# Patient Record
Sex: FEMALE | Race: WHITE | URBAN - NONMETROPOLITAN AREA
[De-identification: names, ages, dates, MRNs, and addresses within clinical notes are randomized per-mention and may not be internally consistent; named-entity substitution may affect disease eponyms.]

---

## 2017-08-15 ENCOUNTER — DOCTOR'S OFFICE (OUTPATIENT)
Dept: URBAN - NONMETROPOLITAN AREA CLINIC 1 | Facility: CLINIC | Age: 57
Setting detail: OPHTHALMOLOGY
End: 2017-08-15
Payer: COMMERCIAL

## 2017-08-15 DIAGNOSIS — H52.13: ICD-10-CM

## 2017-08-15 DIAGNOSIS — H52.4: ICD-10-CM

## 2017-08-15 DIAGNOSIS — H25.13: ICD-10-CM

## 2017-08-15 DIAGNOSIS — H04.123: ICD-10-CM

## 2017-08-15 DIAGNOSIS — H18.052: ICD-10-CM

## 2017-08-15 PROCEDURE — 92014 COMPRE OPH EXAM EST PT 1/>: CPT | Performed by: OPTOMETRIST

## 2017-08-15 PROCEDURE — 92015 DETERMINE REFRACTIVE STATE: CPT | Performed by: OPTOMETRIST

## 2017-08-15 ASSESSMENT — VISUAL ACUITY
OS_BCVA: 20/25+2
OD_BCVA: 20/20-1

## 2017-08-15 ASSESSMENT — REFRACTION_OUTSIDERX
OD_ADD: +2.25
OS_VA1: 20/20
OD_VA3: 20/
OS_AXIS: 085
OS_VA3: 20/
OS_ADD: +2.25
OS_CYLINDER: -0.75
OD_AXIS: 060
OU_VA: 20/
OD_VA2: 20/20
OS_VA2: 20/20
OD_SPHERE: +0.25
OS_SPHERE: PLANO
OD_VA1: 20/20
OD_CYLINDER: -1.00

## 2017-08-15 ASSESSMENT — REFRACTION_AUTOREFRACTION
OS_AXIS: 076
OD_SPHERE: +0.25
OD_CYLINDER: -0.75
OS_SPHERE: +0.25
OS_CYLINDER: -0.75
OD_AXIS: 047

## 2017-08-15 ASSESSMENT — REFRACTION_CURRENTRX
OD_SPHERE: -0.25
OS_AXIS: 087
OD_OVR_VA: 20/
OD_VPRISM_DIRECTION: PROGS
OS_OVR_VA: 20/
OD_ADD: +1.25
OS_OVR_VA: 20/
OS_OVR_VA: 20/
OS_VPRISM_DIRECTION: PROGS
OD_OVR_VA: 20/
OD_CYLINDER: -0.50
OS_SPHERE: -0.25
OD_OVR_VA: 20/
OS_ADD: +1.25
OS_CYLINDER: -0.75
OD_AXIS: 059

## 2017-08-15 ASSESSMENT — REFRACTION_MANIFEST
OD_VA1: 20/
OD_VA3: 20/
OU_VA: 20/
OS_VA3: 20/
OS_VA1: 20/
OD_VA2: 20/
OD_VA1: 20/
OD_VA3: 20/
OS_VA2: 20/
OD_VA2: 20/
OS_VA1: 20/
OS_VA3: 20/
OS_VA2: 20/
OU_VA: 20/

## 2017-08-15 ASSESSMENT — CONFRONTATIONAL VISUAL FIELD TEST (CVF)
OS_FINDINGS: FULL
OD_FINDINGS: FULL

## 2017-08-15 ASSESSMENT — SPHEQUIV_DERIVED
OD_SPHEQUIV: -0.125
OS_SPHEQUIV: -0.125

## 2018-08-28 ENCOUNTER — OPTICAL OFFICE (OUTPATIENT)
Dept: URBAN - NONMETROPOLITAN AREA CLINIC 4 | Facility: CLINIC | Age: 58
Setting detail: OPHTHALMOLOGY
End: 2018-08-28
Payer: COMMERCIAL

## 2018-08-28 ENCOUNTER — DOCTOR'S OFFICE (OUTPATIENT)
Dept: URBAN - NONMETROPOLITAN AREA CLINIC 1 | Facility: CLINIC | Age: 58
Setting detail: OPHTHALMOLOGY
End: 2018-08-28
Payer: COMMERCIAL

## 2018-08-28 DIAGNOSIS — H52.13: ICD-10-CM

## 2018-08-28 DIAGNOSIS — H52.4: ICD-10-CM

## 2018-08-28 DIAGNOSIS — H52.223: ICD-10-CM

## 2018-08-28 PROCEDURE — V2781 PROGRESSIVE LENS PER LENS: HCPCS | Performed by: OPTOMETRIST

## 2018-08-28 PROCEDURE — 92014 COMPRE OPH EXAM EST PT 1/>: CPT | Performed by: OPTOMETRIST

## 2018-08-28 PROCEDURE — V2799 MISC VISION ITEM OR SERVICE: HCPCS | Performed by: OPTOMETRIST

## 2018-08-28 PROCEDURE — V2203 LENS SPHCYL BIFOCAL 4.00D/.1: HCPCS | Performed by: OPTOMETRIST

## 2018-08-28 PROCEDURE — V2750 ANTI-REFLECTIVE COATING: HCPCS | Performed by: OPTOMETRIST

## 2018-08-28 PROCEDURE — V2020 VISION SVCS FRAMES PURCHASES: HCPCS | Performed by: OPTOMETRIST

## 2018-08-28 ASSESSMENT — REFRACTION_AUTOREFRACTION
OD_AXIS: 81
OS_CYLINDER: -1.50
OD_CYLINDER: -1.00
OS_SPHERE: +1.00
OS_AXIS: 76
OD_SPHERE: +0.50

## 2018-08-28 ASSESSMENT — SPHEQUIV_DERIVED
OD_SPHEQUIV: 0
OD_SPHEQUIV: 0
OS_SPHEQUIV: 0.25
OS_SPHEQUIV: -0.125

## 2018-08-28 ASSESSMENT — REFRACTION_MANIFEST
OS_VA1: 20/20
OD_VA3: 20/
OS_VA3: 20/
OD_CYLINDER: -1.00
OS_VA2: 20/
OD_VA2: 20/20
OD_VA1: 20/
OU_VA: 20/
OU_VA: 20/
OS_VA2: 20/20
OD_VA2: 20/
OS_ADD: +2.25
OS_CYLINDER: -1.25
OS_VA3: 20/
OD_VA1: 20/20
OS_SPHERE: +0.50
OS_VA1: 20/
OD_SPHERE: +0.50
OD_ADD: +2.25
OD_VA3: 20/
OS_AXIS: 076
OD_AXIS: 081

## 2018-08-28 ASSESSMENT — REFRACTION_CURRENTRX
OD_ADD: +1.25
OS_CYLINDER: -0.75
OD_AXIS: 059
OD_OVR_VA: 20/
OD_OVR_VA: 20/
OS_OVR_VA: 20/
OD_OVR_VA: 20/
OS_OVR_VA: 20/
OS_OVR_VA: 20/
OS_ADD: +1.25
OD_CYLINDER: -0.50
OD_VPRISM_DIRECTION: PROGS
OD_SPHERE: -0.25
OS_SPHERE: -0.25
OS_VPRISM_DIRECTION: PROGS
OS_AXIS: 087

## 2018-08-28 ASSESSMENT — VISUAL ACUITY
OS_BCVA: 20/30
OD_BCVA: 20/20

## 2018-08-28 ASSESSMENT — CONFRONTATIONAL VISUAL FIELD TEST (CVF)
OS_FINDINGS: FULL
OD_FINDINGS: FULL

## 2019-10-25 ENCOUNTER — DOCTOR'S OFFICE (OUTPATIENT)
Dept: URBAN - NONMETROPOLITAN AREA CLINIC 1 | Facility: CLINIC | Age: 59
Setting detail: OPHTHALMOLOGY
End: 2019-10-25
Payer: COMMERCIAL

## 2019-10-25 ENCOUNTER — RX ONLY (RX ONLY)
Age: 59
End: 2019-10-25

## 2019-10-25 DIAGNOSIS — H04.123: ICD-10-CM

## 2019-10-25 DIAGNOSIS — H25.13: ICD-10-CM

## 2019-10-25 DIAGNOSIS — H18.052: ICD-10-CM

## 2019-10-25 PROCEDURE — 92083 EXTENDED VISUAL FIELD XM: CPT | Performed by: OPTOMETRIST

## 2019-10-25 PROCEDURE — 92014 COMPRE OPH EXAM EST PT 1/>: CPT | Performed by: OPTOMETRIST

## 2019-10-25 ASSESSMENT — REFRACTION_CURRENTRX
OS_VPRISM_DIRECTION: PROGS
OD_OVR_VA: 20/
OD_AXIS: 065
OS_SPHERE: +0.50
OD_OVR_VA: 20/
OD_CYLINDER: -0.75
OD_SPHERE: +0.75
OD_OVR_VA: 20/
OS_ADD: +2.25
OS_CYLINDER: -1.25
OD_ADD: +2.25
OS_OVR_VA: 20/
OS_OVR_VA: 20/
OD_VPRISM_DIRECTION: PROGS
OS_OVR_VA: 20/
OS_AXIS: 075

## 2019-10-25 ASSESSMENT — REFRACTION_MANIFEST
OU_VA: 20/
OD_VA1: 20/20
OS_VA2: 20/
OD_ADD: +2.50
OU_VA: 20/
OS_VA2: 20/20
OD_SPHERE: +0.50
OS_VA1: 20/
OS_ADD: +2.50
OD_AXIS: 090
OS_VA3: 20/
OS_CYLINDER: -1.00
OS_AXIS: 076
OS_SPHERE: +0.50
OD_VA3: 20/
OD_VA1: 20/
OD_VA3: 20/
OD_CYLINDER: -1.25
OD_VA2: 20/20
OS_VA3: 20/
OS_VA1: 20/20
OD_VA2: 20/

## 2019-10-25 ASSESSMENT — REFRACTION_AUTOREFRACTION
OD_SPHERE: 0.00
OD_CYLINDER: -0.50
OD_AXIS: 092
OS_AXIS: 011
OS_CYLINDER: -0.75
OS_SPHERE: -0.25

## 2019-10-25 ASSESSMENT — CONFRONTATIONAL VISUAL FIELD TEST (CVF)
OD_FINDINGS: FULL
OS_FINDINGS: FULL

## 2019-10-25 ASSESSMENT — SPHEQUIV_DERIVED
OS_SPHEQUIV: -0.625
OS_SPHEQUIV: 0
OD_SPHEQUIV: -0.25
OD_SPHEQUIV: -0.125

## 2019-10-25 ASSESSMENT — VISUAL ACUITY
OS_BCVA: 20/20-2
OD_BCVA: 20/20-2

## 2020-10-30 ENCOUNTER — OPTICAL OFFICE (OUTPATIENT)
Dept: URBAN - NONMETROPOLITAN AREA CLINIC 4 | Facility: CLINIC | Age: 60
Setting detail: OPHTHALMOLOGY
End: 2020-10-30
Payer: COMMERCIAL

## 2020-10-30 ENCOUNTER — DOCTOR'S OFFICE (OUTPATIENT)
Dept: URBAN - NONMETROPOLITAN AREA CLINIC 1 | Facility: CLINIC | Age: 60
Setting detail: OPHTHALMOLOGY
End: 2020-10-30
Payer: COMMERCIAL

## 2020-10-30 DIAGNOSIS — H52.13: ICD-10-CM

## 2020-10-30 DIAGNOSIS — H52.223: ICD-10-CM

## 2020-10-30 DIAGNOSIS — H52.4: ICD-10-CM

## 2020-10-30 PROBLEM — H04.123 DRY EYE; BOTH EYES: Status: ACTIVE | Noted: 2017-08-15

## 2020-10-30 PROCEDURE — 92014 COMPRE OPH EXAM EST PT 1/>: CPT | Performed by: OPTOMETRIST

## 2020-10-30 PROCEDURE — V2203 LENS SPHCYL BIFOCAL 4.00D/.1: HCPCS | Performed by: OPTOMETRIST

## 2020-10-30 PROCEDURE — V2020 VISION SVCS FRAMES PURCHASES: HCPCS | Performed by: OPTOMETRIST

## 2020-10-30 PROCEDURE — V2781 PROGRESSIVE LENS PER LENS: HCPCS | Performed by: OPTOMETRIST

## 2020-10-30 ASSESSMENT — REFRACTION_CURRENTRX
OD_OVR_VA: 20/
OD_VPRISM_DIRECTION: PROGS
OS_CYLINDER: -1.75
OD_AXIS: 087
OS_ADD: +2.25
OD_ADD: +2.25
OS_AXIS: 081
OS_VPRISM_DIRECTION: PROGS
OD_CYLINDER: -1.00
OS_OVR_VA: 20/
OS_SPHERE: +1.00
OD_SPHERE: +0.50

## 2020-10-30 ASSESSMENT — REFRACTION_MANIFEST
OD_VA2: 20/20
OD_ADD: +2.50
OD_VA1: 20/20
OS_ADD: +2.50
OD_CYLINDER: -1.50
OS_CYLINDER: -1.50
OD_SPHERE: +0.50
OS_SPHERE: +0.50
OD_AXIS: 098
OS_VA1: 20/20
OS_AXIS: 075
OS_VA2: 20/20

## 2020-10-30 ASSESSMENT — REFRACTION_AUTOREFRACTION
OS_SPHERE: +1.00
OD_AXIS: 098
OD_CYLINDER: -1.75
OD_SPHERE: +0.75
OS_AXIS: 072
OS_CYLINDER: -1.75

## 2020-10-30 ASSESSMENT — VISUAL ACUITY
OD_BCVA: 20/20-1
OS_BCVA: 20/20-1

## 2020-10-30 ASSESSMENT — CONFRONTATIONAL VISUAL FIELD TEST (CVF)
OD_FINDINGS: FULL
OS_FINDINGS: FULL

## 2020-10-30 ASSESSMENT — SPHEQUIV_DERIVED
OS_SPHEQUIV: -0.25
OS_SPHEQUIV: 0.125
OD_SPHEQUIV: -0.125
OD_SPHEQUIV: -0.25

## 2020-10-30 ASSESSMENT — TONOMETRY
OD_IOP_MMHG: 18
OS_IOP_MMHG: 17

## 2021-04-06 ENCOUNTER — HOSPITAL ENCOUNTER (EMERGENCY)
Facility: HOSPITAL | Age: 61
Discharge: HOME | End: 2021-04-06
Attending: EMERGENCY MEDICINE | Admitting: EMERGENCY MEDICINE
Payer: COMMERCIAL

## 2021-04-06 ENCOUNTER — TELEPHONE (OUTPATIENT)
Dept: OTHER | Facility: HOSPITAL | Age: 61
End: 2021-04-06

## 2021-04-06 ENCOUNTER — APPOINTMENT (EMERGENCY)
Dept: CT IMAGING | Facility: HOSPITAL | Age: 61
End: 2021-04-06
Payer: COMMERCIAL

## 2021-04-06 VITALS
RESPIRATION RATE: 18 BRPM | BODY MASS INDEX: 33.8 KG/M2 | TEMPERATURE: 97.5 F | HEIGHT: 64 IN | DIASTOLIC BLOOD PRESSURE: 81 MMHG | SYSTOLIC BLOOD PRESSURE: 169 MMHG | WEIGHT: 198 LBS | HEART RATE: 76 BPM | OXYGEN SATURATION: 98 %

## 2021-04-06 DIAGNOSIS — N20.1 URETEROLITHIASIS: Primary | ICD-10-CM

## 2021-04-06 DIAGNOSIS — N39.0 UTI (URINARY TRACT INFECTION): ICD-10-CM

## 2021-04-06 LAB
ALBUMIN SERPL BCP-MCNC: 3.7 G/DL (ref 3.5–5)
ALP SERPL-CCNC: 134 U/L (ref 46–116)
ALT SERPL W P-5'-P-CCNC: 29 U/L (ref 12–78)
ANION GAP SERPL CALCULATED.3IONS-SCNC: 7 MMOL/L (ref 4–13)
AST SERPL W P-5'-P-CCNC: 15 U/L (ref 5–45)
BACTERIA UR QL AUTO: ABNORMAL /HPF
BASOPHILS # BLD AUTO: 0.06 THOUSANDS/ΜL (ref 0–0.1)
BASOPHILS NFR BLD AUTO: 1 % (ref 0–1)
BILIRUB SERPL-MCNC: 0.33 MG/DL (ref 0.2–1)
BILIRUB UR QL STRIP: NEGATIVE
BUN SERPL-MCNC: 15 MG/DL (ref 5–25)
CALCIUM SERPL-MCNC: 8.8 MG/DL (ref 8.3–10.1)
CHLORIDE SERPL-SCNC: 103 MMOL/L (ref 100–108)
CLARITY UR: CLEAR
CO2 SERPL-SCNC: 30 MMOL/L (ref 21–32)
COLOR UR: ABNORMAL
CREAT SERPL-MCNC: 0.95 MG/DL (ref 0.6–1.3)
EOSINOPHIL # BLD AUTO: 0.4 THOUSAND/ΜL (ref 0–0.61)
EOSINOPHIL NFR BLD AUTO: 4 % (ref 0–6)
ERYTHROCYTE [DISTWIDTH] IN BLOOD BY AUTOMATED COUNT: 12.8 % (ref 11.6–15.1)
GFR SERPL CREATININE-BSD FRML MDRD: 66 ML/MIN/1.73SQ M
GLUCOSE SERPL-MCNC: 103 MG/DL (ref 65–140)
GLUCOSE UR STRIP-MCNC: NEGATIVE MG/DL
HCT VFR BLD AUTO: 38.2 % (ref 34.8–46.1)
HGB BLD-MCNC: 12.3 G/DL (ref 11.5–15.4)
HGB UR QL STRIP.AUTO: ABNORMAL
IMM GRANULOCYTES # BLD AUTO: 0.04 THOUSAND/UL (ref 0–0.2)
IMM GRANULOCYTES NFR BLD AUTO: 0 % (ref 0–2)
KETONES UR STRIP-MCNC: NEGATIVE MG/DL
LEUKOCYTE ESTERASE UR QL STRIP: ABNORMAL
LYMPHOCYTES # BLD AUTO: 1.22 THOUSANDS/ΜL (ref 0.6–4.47)
LYMPHOCYTES NFR BLD AUTO: 12 % (ref 14–44)
MCH RBC QN AUTO: 28.7 PG (ref 26.8–34.3)
MCHC RBC AUTO-ENTMCNC: 32.2 G/DL (ref 31.4–37.4)
MCV RBC AUTO: 89 FL (ref 82–98)
MONOCYTES # BLD AUTO: 1.11 THOUSAND/ΜL (ref 0.17–1.22)
MONOCYTES NFR BLD AUTO: 11 % (ref 4–12)
NEUTROPHILS # BLD AUTO: 7.36 THOUSANDS/ΜL (ref 1.85–7.62)
NEUTS SEG NFR BLD AUTO: 72 % (ref 43–75)
NITRITE UR QL STRIP: POSITIVE
NON-SQ EPI CELLS URNS QL MICRO: ABNORMAL /HPF
NRBC BLD AUTO-RTO: 0 /100 WBCS
PH UR STRIP.AUTO: 6.5 [PH]
PLATELET # BLD AUTO: 277 THOUSANDS/UL (ref 149–390)
PMV BLD AUTO: 9.9 FL (ref 8.9–12.7)
POTASSIUM SERPL-SCNC: 4.1 MMOL/L (ref 3.5–5.3)
PROT SERPL-MCNC: 7.6 G/DL (ref 6.4–8.2)
PROT UR STRIP-MCNC: ABNORMAL MG/DL
RBC # BLD AUTO: 4.29 MILLION/UL (ref 3.81–5.12)
RBC #/AREA URNS AUTO: ABNORMAL /HPF
SODIUM SERPL-SCNC: 140 MMOL/L (ref 136–145)
SP GR UR STRIP.AUTO: 1.01 (ref 1–1.03)
UROBILINOGEN UR QL STRIP.AUTO: 1 E.U./DL
WBC # BLD AUTO: 10.19 THOUSAND/UL (ref 4.31–10.16)
WBC #/AREA URNS AUTO: ABNORMAL /HPF

## 2021-04-06 PROCEDURE — G1004 CDSM NDSC: HCPCS

## 2021-04-06 PROCEDURE — 96375 TX/PRO/DX INJ NEW DRUG ADDON: CPT

## 2021-04-06 PROCEDURE — 36415 COLL VENOUS BLD VENIPUNCTURE: CPT | Performed by: EMERGENCY MEDICINE

## 2021-04-06 PROCEDURE — 74176 CT ABD & PELVIS W/O CONTRAST: CPT

## 2021-04-06 PROCEDURE — 99284 EMERGENCY DEPT VISIT MOD MDM: CPT

## 2021-04-06 PROCEDURE — 96374 THER/PROPH/DIAG INJ IV PUSH: CPT

## 2021-04-06 PROCEDURE — 81001 URINALYSIS AUTO W/SCOPE: CPT | Performed by: EMERGENCY MEDICINE

## 2021-04-06 PROCEDURE — 96361 HYDRATE IV INFUSION ADD-ON: CPT

## 2021-04-06 PROCEDURE — 85025 COMPLETE CBC W/AUTO DIFF WBC: CPT | Performed by: EMERGENCY MEDICINE

## 2021-04-06 PROCEDURE — 87077 CULTURE AEROBIC IDENTIFY: CPT | Performed by: EMERGENCY MEDICINE

## 2021-04-06 PROCEDURE — 99285 EMERGENCY DEPT VISIT HI MDM: CPT | Performed by: EMERGENCY MEDICINE

## 2021-04-06 PROCEDURE — 87086 URINE CULTURE/COLONY COUNT: CPT | Performed by: EMERGENCY MEDICINE

## 2021-04-06 PROCEDURE — 80053 COMPREHEN METABOLIC PANEL: CPT | Performed by: EMERGENCY MEDICINE

## 2021-04-06 RX ORDER — SULFAMETHOXAZOLE AND TRIMETHOPRIM 800; 160 MG/1; MG/1
1 TABLET ORAL ONCE
Status: COMPLETED | OUTPATIENT
Start: 2021-04-06 | End: 2021-04-06

## 2021-04-06 RX ORDER — ONDANSETRON 2 MG/ML
4 INJECTION INTRAMUSCULAR; INTRAVENOUS ONCE
Status: COMPLETED | OUTPATIENT
Start: 2021-04-06 | End: 2021-04-06

## 2021-04-06 RX ORDER — MONTELUKAST SODIUM 10 MG/1
10 TABLET ORAL DAILY
COMMUNITY

## 2021-04-06 RX ORDER — OXYCODONE HYDROCHLORIDE AND ACETAMINOPHEN 5; 325 MG/1; MG/1
1 TABLET ORAL EVERY 8 HOURS PRN
Qty: 12 TABLET | Refills: 0 | Status: SHIPPED | OUTPATIENT
Start: 2021-04-06 | End: 2021-04-16

## 2021-04-06 RX ORDER — AZATHIOPRINE 50 MG/1
50 TABLET ORAL DAILY
Status: ON HOLD | COMMUNITY
End: 2021-05-19

## 2021-04-06 RX ORDER — KETOROLAC TROMETHAMINE 30 MG/ML
30 INJECTION, SOLUTION INTRAMUSCULAR; INTRAVENOUS ONCE
Status: COMPLETED | OUTPATIENT
Start: 2021-04-06 | End: 2021-04-06

## 2021-04-06 RX ORDER — TAMSULOSIN HYDROCHLORIDE 0.4 MG/1
0.8 CAPSULE ORAL
Qty: 14 CAPSULE | Refills: 0 | Status: SHIPPED | OUTPATIENT
Start: 2021-04-06 | End: 2021-05-20 | Stop reason: SDUPTHER

## 2021-04-06 RX ORDER — SULFAMETHOXAZOLE AND TRIMETHOPRIM 800; 160 MG/1; MG/1
1 TABLET ORAL 2 TIMES DAILY
Qty: 14 TABLET | Refills: 0 | Status: SHIPPED | OUTPATIENT
Start: 2021-04-06 | End: 2021-04-13

## 2021-04-06 RX ORDER — EZETIMIBE 10 MG/1
10 TABLET ORAL DAILY
COMMUNITY
Start: 2020-11-07 | End: 2021-12-09

## 2021-04-06 RX ORDER — PANTOPRAZOLE SODIUM 40 MG/1
40 TABLET, DELAYED RELEASE ORAL DAILY
COMMUNITY
Start: 2020-11-23

## 2021-04-06 RX ADMIN — SODIUM CHLORIDE 1000 ML: 0.9 INJECTION, SOLUTION INTRAVENOUS at 08:10

## 2021-04-06 RX ADMIN — KETOROLAC TROMETHAMINE 30 MG: 30 INJECTION, SOLUTION INTRAMUSCULAR at 08:10

## 2021-04-06 RX ADMIN — MORPHINE SULFATE 2 MG: 2 INJECTION, SOLUTION INTRAMUSCULAR; INTRAVENOUS at 10:03

## 2021-04-06 RX ADMIN — SULFAMETHOXAZOLE AND TRIMETHOPRIM 1 TABLET: 800; 160 TABLET ORAL at 10:04

## 2021-04-06 RX ADMIN — ONDANSETRON 4 MG: 2 INJECTION INTRAMUSCULAR; INTRAVENOUS at 08:09

## 2021-04-06 NOTE — ED PROVIDER NOTES
History  Chief Complaint   Patient presents with    Flank Pain     Pt reports L flank pain that radiates to L groin for the last 2 days  Hx of kidney stones  Pt thought it would related to a uti and took OTC Azo  Two days left flank pain with radiation to the left lower quadrant  Some associated nausea  No vomiting  No abdominal pain  No dysuria  No fevers  History provided by:  Patient   used: No    Flank Pain  Pain location:  L flank  Pain quality: sharp    Pain radiates to:  LLQ  Pain severity:  Severe  Onset quality:  Gradual  Duration:  2 days  Timing:  Constant  Progression:  Unchanged  Chronicity:  New  Relieved by:  Nothing  Worsened by:  Nothing  Associated symptoms: nausea    Associated symptoms: no anorexia, no belching, no chest pain, no chills, no constipation, no cough, no diarrhea, no dysuria, no fatigue, no fever, no flatus, no hematemesis, no hematochezia, no hematuria, no shortness of breath, no sore throat, no vaginal bleeding, no vaginal discharge and no vomiting        Prior to Admission Medications   Prescriptions Last Dose Informant Patient Reported? Taking?   azaTHIOprine (IMURAN) 50 mg tablet   Yes Yes   Sig: Take 50 mg by mouth daily   ezetimibe (ZETIA) 10 mg tablet   Yes Yes   Sig: Take 10 mg by mouth daily   montelukast (SINGULAIR) 10 mg tablet   Yes No   Sig: Take 10 mg by mouth daily   pantoprazole (PROTONIX) 40 mg tablet   Yes Yes   Sig: Take 40 mg by mouth daily      Facility-Administered Medications: None       Past Medical History:   Diagnosis Date    Breast cancer ([facility] )     H/O cervical fracture     Rheumatoid arthritis ([facility] )        Past Surgical History:   Procedure Laterality Date    BLADDER SUSPENSION      BREAST LUMPECTOMY       SECTION      HYSTERECTOMY         History reviewed  No pertinent family history  I have reviewed and agree with the history as documented      E-Cigarette/Vaping     E-Cigarette/Vaping Substances Social History     Tobacco Use    Smoking status: Former Smoker     Quit date:      Years since quittin 2    Smokeless tobacco: Never Used   Substance Use Topics    Alcohol use: Yes     Frequency: Monthly or less     Drinks per session: 1 or 2    Drug use: Not Currently       Review of Systems   Constitutional: Negative for chills, fatigue and fever  HENT: Negative for ear pain, hearing loss, sore throat, trouble swallowing and voice change  Eyes: Negative for pain and discharge  Respiratory: Negative for cough, shortness of breath and wheezing  Cardiovascular: Negative for chest pain and palpitations  Gastrointestinal: Positive for nausea  Negative for abdominal pain, anorexia, blood in stool, constipation, diarrhea, flatus, hematemesis, hematochezia and vomiting  Genitourinary: Positive for flank pain  Negative for dysuria, frequency, hematuria, vaginal bleeding and vaginal discharge  Musculoskeletal: Negative for joint swelling, neck pain and neck stiffness  Skin: Negative for rash and wound  Neurological: Negative for dizziness, seizures, syncope, facial asymmetry and headaches  Psychiatric/Behavioral: Negative for hallucinations, self-injury and suicidal ideas  All other systems reviewed and are negative  Physical Exam  Physical Exam  Vitals signs and nursing note reviewed  Constitutional:       General: She is not in acute distress  Appearance: She is well-developed  HENT:      Head: Normocephalic and atraumatic  Right Ear: External ear normal       Left Ear: External ear normal    Eyes:      Conjunctiva/sclera: Conjunctivae normal       Pupils: Pupils are equal, round, and reactive to light  Neck:      Musculoskeletal: Normal range of motion and neck supple  Cardiovascular:      Rate and Rhythm: Normal rate and regular rhythm  Heart sounds: Normal heart sounds  No murmur     Pulmonary:      Effort: Pulmonary effort is normal       Breath sounds: Normal breath sounds  No wheezing or rales  Abdominal:      General: Bowel sounds are normal  There is no distension  Palpations: Abdomen is soft  Tenderness: There is no abdominal tenderness  There is no guarding or rebound  Musculoskeletal: Normal range of motion  General: No deformity  Skin:     General: Skin is warm and dry  Findings: No rash  Neurological:      General: No focal deficit present  Mental Status: She is alert and oriented to person, place, and time  Cranial Nerves: No cranial nerve deficit     Psychiatric:         Behavior: Behavior normal          Vital Signs  ED Triage Vitals   Temperature Pulse Respirations Blood Pressure SpO2   04/06/21 0740 04/06/21 0742 04/06/21 0742 04/06/21 0743 04/06/21 0742   98 2 °F (36 8 °C) 79 19 154/95 99 %      Temp Source Heart Rate Source Patient Position - Orthostatic VS BP Location FiO2 (%)   04/06/21 0740 04/06/21 0742 04/06/21 0946 04/06/21 0946 --   Temporal Monitor Sitting Left arm       Pain Score       04/06/21 0742       9           Vitals:    04/06/21 0742 04/06/21 0743 04/06/21 0946   BP:  154/95 151/75   Pulse: 79  72   Patient Position - Orthostatic VS:   Sitting         Visual Acuity      ED Medications  Medications   morphine injection 2 mg (has no administration in time range)   sodium chloride 0 9 % bolus 1,000 mL (1,000 mL Intravenous New Bag 4/6/21 0810)   ketorolac (TORADOL) injection 30 mg (30 mg Intravenous Given 4/6/21 0810)   morphine injection 2 mg (2 mg Intravenous Given 4/6/21 1003)   ondansetron (ZOFRAN) injection 4 mg (4 mg Intravenous Given 4/6/21 0809)   sulfamethoxazole-trimethoprim (BACTRIM DS) 800-160 mg per tablet 1 tablet (1 tablet Oral Given 4/6/21 1004)       Diagnostic Studies  Results Reviewed     Procedure Component Value Units Date/Time    Comprehensive metabolic panel [[id number]]  (Abnormal) Collected: 04/06/21 1958    Lab Status: Final result Specimen: Blood from Arm, Left Updated: 04/06/21 0820     Sodium 140 mmol/L      Potassium 4 1 mmol/L      Chloride 103 mmol/L      CO2 30 mmol/L      ANION GAP 7 mmol/L      BUN 15 mg/dL      Creatinine 0 95 mg/dL      Glucose 103 mg/dL      Calcium 8 8 mg/dL      AST 15 U/L      ALT 29 U/L      Alkaline Phosphatase 134 U/L      Total Protein 7 6 g/dL      Albumin 3 7 g/dL      Total Bilirubin 0 33 mg/dL      eGFR 66 ml/min/1 73sq m     Narrative:      [place] guidelines for Chronic Kidney Disease (CKD):     Stage 1 with normal or high GFR (GFR > 90 mL/min/1 73 square meters)    Stage 2 Mild CKD (GFR = 60-89 mL/min/1 73 square meters)    Stage 3A Moderate CKD (GFR = 45-59 mL/min/1 73 square meters)    Stage 3B Moderate CKD (GFR = 30-44 mL/min/1 73 square meters)    Stage 4 Severe CKD (GFR = 15-29 mL/min/1 73 square meters)    Stage 5 End Stage CKD (GFR <15 mL/min/1 73 square meters)  Note: GFR calculation is accurate only with a steady state creatinine    Urine Microscopic [[id number]]  (Abnormal) Collected: 04/06/21 0751    Lab Status: Final result Specimen: Urine, Clean Catch Updated: 04/06/21 0818     RBC, UA 0-1 /hpf      WBC, UA 20-30 /hpf      Epithelial Cells Moderate /hpf      Bacteria, UA Occasional /hpf     Urine culture [[id number]] Collected: 04/06/21 0751    Lab Status:  In process Specimen: Urine, Clean Catch Updated: 04/06/21 0818    UA w Reflex to Microscopic w Reflex to Culture [[id number]]  (Abnormal) Collected: 04/06/21 0751    Lab Status: Final result Specimen: Urine, Clean Catch Updated: 04/06/21 0808     Color, UA Orange     Clarity, UA Clear     Specific Gravity, UA 1 010     pH, UA 6 5     Leukocytes, UA Large     Nitrite, UA Positive     Protein, UA Trace mg/dl      Glucose, UA Negative mg/dl      Ketones, UA Negative mg/dl      Urobilinogen, UA 1 0 E U /dl      Bilirubin, UA Negative     Blood, UA Trace-Intact    CBC and differential [[id number]]  (Abnormal) Collected: 04/06/21 0757    Lab Status: Final result Specimen: Blood from Arm, Left Updated: 04/06/21 0802     WBC 10 19 Thousand/uL      RBC 4 29 Million/uL      Hemoglobin 12 3 g/dL      Hematocrit 38 2 %      MCV 89 fL      MCH 28 7 pg      MCHC 32 2 g/dL      RDW 12 8 %      MPV 9 9 fL      Platelets 174 Thousands/uL      nRBC 0 /100 WBCs      Neutrophils Relative 72 %      Immat GRANS % 0 %      Lymphocytes Relative 12 %      Monocytes Relative 11 %      Eosinophils Relative 4 %      Basophils Relative 1 %      Neutrophils Absolute 7 36 Thousands/µL      Immature Grans Absolute 0 04 Thousand/uL      Lymphocytes Absolute 1 22 Thousands/µL      Monocytes Absolute 1 11 Thousand/µL      Eosinophils Absolute 0 40 Thousand/µL      Basophils Absolute 0 06 Thousands/µL                  CT renal stone study abdomen pelvis wo contrast   Final Result by Adryan Larios MD (04/06 5917)      Mildly obstructing 6 mm calculus in the proximal left ureter  Bilateral intrarenal calculi  Workstation performed: [id number]                    Procedures  Procedures         ED Course  ED Course as of Apr 06 1005   Tue Apr 06, 2021   5001 Urology paged          [id number] Discussed with urology, they recommend bactrim outpt, flomax, follow up in office, they will arrange follow up for pt in 1 week      1003 Pt provided with urine strainer                                                MDM  Number of Diagnoses or Management Options     Amount and/or Complexity of Data Reviewed  Clinical lab tests: ordered and reviewed  Tests in the radiology section of CPT®: ordered and reviewed  Decide to obtain previous medical records or to obtain history from someone other than the patient: yes  Review and summarize past medical records: yes  Independent visualization of images, tracings, or specimens: yes        Disposition  Final diagnoses:   Ureterolithiasis   UTI (urinary tract infection)     Time reflects when diagnosis was documented in both MDM as applicable and the Disposition within this note     Time User Action Codes Description Comment    4/6/2021 10:00 AM Conner Lange Add [N20 1] Ureterolithiasis     4/6/2021 10:00 AM Conner Lange [N39 0] UTI (urinary tract infection)       ED Disposition     ED Disposition Condition Date/Time Comment    Discharge Stable Tue Apr 6, 2021 10:00 AM [place] discharge to home/self care  Follow-up Information     Follow up With Specialties Details Why Georgette Quintanilla MD Urology In 2 days  [address] [place] [address]  [phone]            Patient's Medications   Discharge Prescriptions    OXYCODONE-ACETAMINOPHEN (PERCOCET) 5-325 MG PER TABLET    Take 1 tablet by mouth every 8 (eight) hours as needed for moderate pain for up to 10 daysMax Daily Amount: 3 tablets       Start Date: 4/6/2021  End Date: 4/16/2021       Order Dose: 1 tablet       Quantity: 12 tablet    Refills: 0    SULFAMETHOXAZOLE-TRIMETHOPRIM (BACTRIM DS) 800-160 MG PER TABLET    Take 1 tablet by mouth 2 (two) times a day for 7 days smx-tmp DS (BACTRIM) 800-160 mg tabs (1tab q12 D10)       Start Date: 4/6/2021  End Date: 4/13/2021       Order Dose: 1 tablet       Quantity: 14 tablet    Refills: 0    TAMSULOSIN (FLOMAX) 0 4 MG    Take 2 capsules (0 8 mg total) by mouth daily with dinner for 7 days       Start Date: 4/6/2021  End Date: 4/13/2021       Order Dose: 0 8 mg       Quantity: 14 capsule    Refills: 0     No discharge procedures on file      PDMP Review     None          ED Provider  Electronically Signed by           Annamaria Hernandez MD  04/06/21 9776

## 2021-04-07 ENCOUNTER — HOSPITAL ENCOUNTER (EMERGENCY)
Facility: HOSPITAL | Age: 61
End: 2021-04-07
Attending: EMERGENCY MEDICINE | Admitting: EMERGENCY MEDICINE
Payer: COMMERCIAL

## 2021-04-07 ENCOUNTER — HOSPITAL ENCOUNTER (INPATIENT)
Facility: HOSPITAL | Age: 61
LOS: 2 days | Discharge: HOME | DRG: 853 | End: 2021-04-09
Attending: INTERNAL MEDICINE | Admitting: INTERNAL MEDICINE
Payer: COMMERCIAL

## 2021-04-07 ENCOUNTER — TELEPHONE (OUTPATIENT)
Dept: UROLOGY | Facility: MEDICAL CENTER | Age: 61
End: 2021-04-07

## 2021-04-07 VITALS
OXYGEN SATURATION: 98 % | HEIGHT: 64 IN | HEART RATE: 86 BPM | WEIGHT: 198 LBS | SYSTOLIC BLOOD PRESSURE: 119 MMHG | RESPIRATION RATE: 20 BRPM | BODY MASS INDEX: 33.8 KG/M2 | TEMPERATURE: 98.1 F | DIASTOLIC BLOOD PRESSURE: 77 MMHG

## 2021-04-07 DIAGNOSIS — N39.0 UTI (URINARY TRACT INFECTION): ICD-10-CM

## 2021-04-07 DIAGNOSIS — N20.0 KIDNEY STONE: Primary | ICD-10-CM

## 2021-04-07 DIAGNOSIS — N20.1 LEFT URETERAL STONE: ICD-10-CM

## 2021-04-07 DIAGNOSIS — N20.1 LEFT URETERAL STONE: Primary | ICD-10-CM

## 2021-04-07 DIAGNOSIS — N17.9 AKI (ACUTE KIDNEY INJURY) (HCC): ICD-10-CM

## 2021-04-07 PROBLEM — A41.9 SEPSIS (HCC): Status: ACTIVE | Noted: 2021-04-07

## 2021-04-07 PROBLEM — N13.30 HYDRONEPHROSIS OF LEFT KIDNEY: Status: ACTIVE | Noted: 2021-04-07

## 2021-04-07 PROBLEM — M06.9 RHEUMATOID ARTHRITIS (HCC): Status: ACTIVE | Noted: 2021-04-07

## 2021-04-07 PROBLEM — N12 PYELONEPHRITIS OF LEFT KIDNEY: Status: ACTIVE | Noted: 2021-04-07

## 2021-04-07 LAB
ALBUMIN SERPL BCP-MCNC: 3.5 G/DL (ref 3.5–5)
ALP SERPL-CCNC: 159 U/L (ref 46–116)
ALT SERPL W P-5'-P-CCNC: 26 U/L (ref 12–78)
ANION GAP SERPL CALCULATED.3IONS-SCNC: 9 MMOL/L (ref 4–13)
AST SERPL W P-5'-P-CCNC: 19 U/L (ref 5–45)
BACTERIA UR QL AUTO: ABNORMAL /HPF
BASOPHILS # BLD AUTO: 0.07 THOUSANDS/ΜL (ref 0–0.1)
BASOPHILS NFR BLD AUTO: 1 % (ref 0–1)
BILIRUB SERPL-MCNC: 0.82 MG/DL (ref 0.2–1)
BILIRUB UR QL STRIP: ABNORMAL
BUN SERPL-MCNC: 11 MG/DL (ref 5–25)
CALCIUM SERPL-MCNC: 9.3 MG/DL (ref 8.3–10.1)
CHLORIDE SERPL-SCNC: 98 MMOL/L (ref 100–108)
CLARITY UR: ABNORMAL
CO2 SERPL-SCNC: 27 MMOL/L (ref 21–32)
COLOR UR: YELLOW
CREAT SERPL-MCNC: 1.35 MG/DL (ref 0.6–1.3)
EOSINOPHIL # BLD AUTO: 0.11 THOUSAND/ΜL (ref 0–0.61)
EOSINOPHIL NFR BLD AUTO: 1 % (ref 0–6)
ERYTHROCYTE [DISTWIDTH] IN BLOOD BY AUTOMATED COUNT: 12.9 % (ref 11.6–15.1)
GFR SERPL CREATININE-BSD FRML MDRD: 43 ML/MIN/1.73SQ M
GLUCOSE SERPL-MCNC: 114 MG/DL (ref 65–140)
GLUCOSE UR STRIP-MCNC: NEGATIVE MG/DL
HCT VFR BLD AUTO: 38.3 % (ref 34.8–46.1)
HGB BLD-MCNC: 12.4 G/DL (ref 11.5–15.4)
HGB UR QL STRIP.AUTO: ABNORMAL
IMM GRANULOCYTES # BLD AUTO: 0.04 THOUSAND/UL (ref 0–0.2)
IMM GRANULOCYTES NFR BLD AUTO: 0 % (ref 0–2)
KETONES UR STRIP-MCNC: ABNORMAL MG/DL
LACTATE SERPL-SCNC: 0.9 MMOL/L (ref 0.5–2)
LEUKOCYTE ESTERASE UR QL STRIP: ABNORMAL
LYMPHOCYTES # BLD AUTO: 0.76 THOUSANDS/ΜL (ref 0.6–4.47)
LYMPHOCYTES NFR BLD AUTO: 6 % (ref 14–44)
MCH RBC QN AUTO: 28.6 PG (ref 26.8–34.3)
MCHC RBC AUTO-ENTMCNC: 32.4 G/DL (ref 31.4–37.4)
MCV RBC AUTO: 88 FL (ref 82–98)
MONOCYTES # BLD AUTO: 1.18 THOUSAND/ΜL (ref 0.17–1.22)
MONOCYTES NFR BLD AUTO: 10 % (ref 4–12)
NEUTROPHILS # BLD AUTO: 9.68 THOUSANDS/ΜL (ref 1.85–7.62)
NEUTS SEG NFR BLD AUTO: 82 % (ref 43–75)
NITRITE UR QL STRIP: NEGATIVE
NON-SQ EPI CELLS URNS QL MICRO: ABNORMAL /HPF
NRBC BLD AUTO-RTO: 0 /100 WBCS
PH UR STRIP.AUTO: 6.5 [PH]
PLATELET # BLD AUTO: 249 THOUSANDS/UL (ref 149–390)
PLATELET # BLD AUTO: 277 THOUSANDS/UL (ref 149–390)
PMV BLD AUTO: 10.1 FL (ref 8.9–12.7)
PMV BLD AUTO: 10.1 FL (ref 8.9–12.7)
POTASSIUM SERPL-SCNC: 3.8 MMOL/L (ref 3.5–5.3)
PROT SERPL-MCNC: 8 G/DL (ref 6.4–8.2)
PROT UR STRIP-MCNC: ABNORMAL MG/DL
RBC # BLD AUTO: 4.34 MILLION/UL (ref 3.81–5.12)
RBC #/AREA URNS AUTO: ABNORMAL /HPF
SODIUM SERPL-SCNC: 134 MMOL/L (ref 136–145)
SP GR UR STRIP.AUTO: 1.01 (ref 1–1.03)
UROBILINOGEN UR QL STRIP.AUTO: 0.2 E.U./DL
WBC # BLD AUTO: 11.84 THOUSAND/UL (ref 4.31–10.16)
WBC #/AREA URNS AUTO: ABNORMAL /HPF

## 2021-04-07 PROCEDURE — 99285 EMERGENCY DEPT VISIT HI MDM: CPT | Performed by: PHYSICIAN ASSISTANT

## 2021-04-07 PROCEDURE — 80053 COMPREHEN METABOLIC PANEL: CPT | Performed by: PHYSICIAN ASSISTANT

## 2021-04-07 PROCEDURE — 85025 COMPLETE CBC W/AUTO DIFF WBC: CPT | Performed by: PHYSICIAN ASSISTANT

## 2021-04-07 PROCEDURE — 85049 AUTOMATED PLATELET COUNT: CPT | Performed by: INTERNAL MEDICINE

## 2021-04-07 PROCEDURE — 83605 ASSAY OF LACTIC ACID: CPT | Performed by: PHYSICIAN ASSISTANT

## 2021-04-07 PROCEDURE — 99284 EMERGENCY DEPT VISIT MOD MDM: CPT

## 2021-04-07 PROCEDURE — 96361 HYDRATE IV INFUSION ADD-ON: CPT

## 2021-04-07 PROCEDURE — 36415 COLL VENOUS BLD VENIPUNCTURE: CPT | Performed by: PHYSICIAN ASSISTANT

## 2021-04-07 PROCEDURE — 99223 1ST HOSP IP/OBS HIGH 75: CPT | Performed by: INTERNAL MEDICINE

## 2021-04-07 PROCEDURE — 87040 BLOOD CULTURE FOR BACTERIA: CPT | Performed by: INTERNAL MEDICINE

## 2021-04-07 PROCEDURE — 96365 THER/PROPH/DIAG IV INF INIT: CPT

## 2021-04-07 PROCEDURE — 96375 TX/PRO/DX INJ NEW DRUG ADDON: CPT

## 2021-04-07 PROCEDURE — 81001 URINALYSIS AUTO W/SCOPE: CPT | Performed by: PHYSICIAN ASSISTANT

## 2021-04-07 PROCEDURE — 87086 URINE CULTURE/COLONY COUNT: CPT | Performed by: PHYSICIAN ASSISTANT

## 2021-04-07 RX ORDER — EZETIMIBE 10 MG/1
10 TABLET ORAL DAILY
Status: DISCONTINUED | OUTPATIENT
Start: 2021-04-08 | End: 2021-04-09 | Stop reason: HOSPADM

## 2021-04-07 RX ORDER — MONTELUKAST SODIUM 10 MG/1
10 TABLET ORAL DAILY
Status: DISCONTINUED | OUTPATIENT
Start: 2021-04-08 | End: 2021-04-09 | Stop reason: HOSPADM

## 2021-04-07 RX ORDER — ONDANSETRON 2 MG/ML
4 INJECTION INTRAMUSCULAR; INTRAVENOUS ONCE
Status: COMPLETED | OUTPATIENT
Start: 2021-04-07 | End: 2021-04-07

## 2021-04-07 RX ORDER — AZATHIOPRINE 50 MG/1
50 TABLET ORAL DAILY
Status: DISCONTINUED | OUTPATIENT
Start: 2021-04-08 | End: 2021-04-07

## 2021-04-07 RX ORDER — OXYCODONE HYDROCHLORIDE AND ACETAMINOPHEN 5; 325 MG/1; MG/1
1 TABLET ORAL EVERY 8 HOURS PRN
Status: DISCONTINUED | OUTPATIENT
Start: 2021-04-07 | End: 2021-04-09 | Stop reason: HOSPADM

## 2021-04-07 RX ORDER — TAMSULOSIN HYDROCHLORIDE 0.4 MG/1
0.4 CAPSULE ORAL
Status: DISCONTINUED | OUTPATIENT
Start: 2021-04-08 | End: 2021-04-09 | Stop reason: HOSPADM

## 2021-04-07 RX ORDER — HEPARIN SODIUM 5000 [USP'U]/ML
5000 INJECTION, SOLUTION INTRAVENOUS; SUBCUTANEOUS EVERY 8 HOURS SCHEDULED
Status: DISCONTINUED | OUTPATIENT
Start: 2021-04-07 | End: 2021-04-09 | Stop reason: HOSPADM

## 2021-04-07 RX ORDER — PANTOPRAZOLE SODIUM 40 MG/1
40 TABLET, DELAYED RELEASE ORAL
Status: DISCONTINUED | OUTPATIENT
Start: 2021-04-08 | End: 2021-04-09 | Stop reason: HOSPADM

## 2021-04-07 RX ORDER — SODIUM CHLORIDE, SODIUM GLUCONATE, SODIUM ACETATE, POTASSIUM CHLORIDE, MAGNESIUM CHLORIDE, SODIUM PHOSPHATE, DIBASIC, AND POTASSIUM PHOSPHATE .53; .5; .37; .037; .03; .012; .00082 G/100ML; G/100ML; G/100ML; G/100ML; G/100ML; G/100ML; G/100ML
100 INJECTION, SOLUTION INTRAVENOUS CONTINUOUS
Status: DISCONTINUED | OUTPATIENT
Start: 2021-04-07 | End: 2021-04-09 | Stop reason: HOSPADM

## 2021-04-07 RX ORDER — CEFTRIAXONE 1 G/50ML
1000 INJECTION, SOLUTION INTRAVENOUS ONCE
Status: COMPLETED | OUTPATIENT
Start: 2021-04-07 | End: 2021-04-07

## 2021-04-07 RX ADMIN — SODIUM CHLORIDE 1000 ML: 0.9 INJECTION, SOLUTION INTRAVENOUS at 16:35

## 2021-04-07 RX ADMIN — CEFTRIAXONE 1000 MG: 1 INJECTION, SOLUTION INTRAVENOUS at 17:32

## 2021-04-07 RX ADMIN — OXYCODONE HYDROCHLORIDE AND ACETAMINOPHEN 1 TABLET: 5; 325 TABLET ORAL at 21:07

## 2021-04-07 RX ADMIN — SODIUM CHLORIDE, SODIUM GLUCONATE, SODIUM ACETATE, POTASSIUM CHLORIDE, MAGNESIUM CHLORIDE, SODIUM PHOSPHATE, DIBASIC, AND POTASSIUM PHOSPHATE 100 ML/HR: .53; .5; .37; .037; .03; .012; .00082 INJECTION, SOLUTION INTRAVENOUS at 21:07

## 2021-04-07 RX ADMIN — HEPARIN SODIUM 5000 UNITS: 5000 INJECTION INTRAVENOUS; SUBCUTANEOUS at 21:05

## 2021-04-07 RX ADMIN — ONDANSETRON 4 MG: 2 INJECTION INTRAMUSCULAR; INTRAVENOUS at 16:35

## 2021-04-07 NOTE — TELEPHONE ENCOUNTER
Patient was given an appointment with Dr Mary Figueroa for 04/15/2021  Patient was LVM with appointment info, office location and phone number

## 2021-04-07 NOTE — EMTALA/ACUTE CARE TRANSFER
[address]  [facility] [address]  Dept: [phone]      EMTALA TRANSFER CONSENT    NAME Alexandria López                                         1960                              MRN [id number]    I have been informed of my rights regarding examination, treatment, and transfer   by Dr Dougie Eric DO    Benefits:      Risks:        Consent for Transfer:  I acknowledge that my medical condition has been evaluated and explained to me by the emergency department physician or other qualified medical person and/or my attending physician, who has recommended that I be transferred to the service of  Accepting Physician: Dr Brian Hoang at [address] Name, [address] : [facility]  The above potential benefits of such transfer, the potential risks associated with such transfer, and the probable risks of not being transferred have been explained to me, and I fully understand them  The doctor has explained that, in my case, the benefits of transfer outweigh the risks  I agree to be transferred  I authorize the performance of emergency medical procedures and treatments upon me in both transit and upon arrival at the receiving facility  Additionally, I authorize the release of any and all medical records to the receiving facility and request they be transported with me, if possible  I understand that the safest mode of transportation during a medical emergency is an ambulance and that the Hospital advocates the use of this mode of transport  Risks of traveling to the receiving facility by car, including absence of medical control, life sustaining equipment, such as oxygen, and medical personnel has been explained to me and I fully understand them  (COLE CORRECT BOX BELOW)  [  ]  I consent to the stated transfer and to be transported by ambulance/helicopter    [  ]  I consent to the stated transfer, but refuse transportation by ambulance and accept full responsibility for my transportation by car  I understand the risks of non-ambulance transfers and I exonerate the Hospital and its staff from any deterioration in my condition that results from this refusal     X___________________________________________    DATE  21  TIME________  Signature of patient or legally responsible individual signing on patient behalf           RELATIONSHIP TO PATIENT_________________________          Provider Certification    NAME Shilpa Nguyen                                         1960                              MRN [id number]    A medical screening exam was performed on the above named patient  Based on the examination:    Condition Necessitating Transfer The primary encounter diagnosis was Kidney stone  Diagnoses of UTI (urinary tract infection) and JIN ([facility]) [facility] were also pertinent to this visit  Patient Condition:      Reason for Transfer:      Transfer Requirements: Facility     · Space available and qualified personnel available for treatment as acknowledged by    · Agreed to accept transfer and to provide appropriate medical treatment as acknowledged by          · Appropriate medical records of the examination and treatment of the patient are provided at the time of transfer   [address] [facility] [place], [place], [address] _______  · Transfer will be performed by qualified personnel from    and appropriate transfer equipment as required, including the use of necessary and appropriate life support measures      Provider Certification: I have examined the patient and explained the following risks and benefits of being transferred/refusing transfer to the patient/family:         Based on these reasonable risks and benefits to the patient and/or the unborn child(gurmeet), and based upon the information available at the time of the patients examination, I certify that the medical benefits reasonably to be expected from the provision of appropriate medical treatments at another medical facility outweigh the increasing risks, if any, to the individuals medical condition, and in the case of labor to the unborn child, from effecting the transfer      X____________________________________________ DATE 04/07/21        TIME_______      ORIGINAL - SEND TO MEDICAL RECORDS   COPY - SEND WITH PATIENT DURING TRANSFER

## 2021-04-07 NOTE — TELEPHONE ENCOUNTER
Pt called stating she has a fever of 100 7  She was advised to proceed to ED to check for infection and evaluation of pain with kidney stones  Pt agreed to this plan

## 2021-04-07 NOTE — ED PROVIDER NOTES
History  Chief Complaint   Patient presents with    Fever - 9 weeks to 76 years     Pt was seen in the ED yesterday and was dx with a 6mm left sided kidney stone, called the urologist office which pt had mentioned she started with fevers today, tmax of 100 7, told to come back to ED for eval      61-year-old female presents the emergency department due to fever  Patient states she was seen in the emergency department yesterday for flank pain at that point she was diagnosed with 6 mm left-sided obstructing kidney stone with mild hydronephrosis  Patient was sent home on Bactrim with outpatient Urology follow-up  Patient call urologist today and reported temp of 100 7° F was instructed to come to the emergency department for further evaluation  Patient reports mild nausea  Denies any vomiting  Denies any dysuria, hematuria, urinary frequency  Patient states she has been controlling pain with Motrin last dose at 2:00 pm  She denies cough, congestion, sore throat  History provided by:  Patient  Fever - 9 weeks to 74 years  Max temp prior to arrival:  100 7  Associated symptoms: nausea    Associated symptoms: no chest pain, no chills, no confusion, no congestion, no cough, no diarrhea, no dysuria, no ear pain, no headaches, no myalgias, no rash, no rhinorrhea, no somnolence, no sore throat and no vomiting        Prior to Admission Medications   Prescriptions Last Dose Informant Patient Reported?  Taking?   azaTHIOprine (IMURAN) 50 mg tablet   Yes Yes   Sig: Take 50 mg by mouth daily   ezetimibe (ZETIA) 10 mg tablet   Yes Yes   Sig: Take 10 mg by mouth daily   montelukast (SINGULAIR) 10 mg tablet   Yes Yes   Sig: Take 10 mg by mouth daily   oxyCODONE-acetaminophen (PERCOCET) 5-325 mg per tablet   No Yes   Sig: Take 1 tablet by mouth every 8 (eight) hours as needed for moderate pain for up to 10 daysMax Daily Amount: 3 tablets   pantoprazole (PROTONIX) 40 mg tablet   Yes Yes   Sig: Take 40 mg by mouth daily sulfamethoxazole-trimethoprim (BACTRIM DS) 800-160 mg per tablet 2021 at Unknown time  No Yes   Sig: Take 1 tablet by mouth 2 (two) times a day for 7 days smx-tmp DS (BACTRIM) 800-160 mg tabs (1tab q12 D10)   tamsulosin (FLOMAX) 0 4 mg 2021 at Unknown time  No Yes   Sig: Take 2 capsules (0 8 mg total) by mouth daily with dinner for 7 days      Facility-Administered Medications: None       Past Medical History:   Diagnosis Date    Breast cancer ([facility] [address] )     H/O cervical fracture     Rheumatoid arthritis ([facility] )        Past Surgical History:   Procedure Laterality Date    BLADDER SUSPENSION      BREAST LUMPECTOMY       SECTION      HYSTERECTOMY         History reviewed  No pertinent family history  I have reviewed and agree with the history as documented  E-Cigarette/Vaping     E-Cigarette/Vaping Substances     Social History     Tobacco Use    Smoking status: Former Smoker     Quit date:      Years since quittin 2    Smokeless tobacco: Never Used   Substance Use Topics    Alcohol use: Yes     Frequency: Monthly or less     Drinks per session: 1 or 2    Drug use: Not Currently       Review of Systems   Constitutional: Positive for fever  Negative for appetite change, chills, diaphoresis and fatigue  HENT: Negative  Negative for congestion, ear pain, rhinorrhea and sore throat  Eyes: Negative for visual disturbance  Respiratory: Negative for cough, choking, chest tightness, shortness of breath, wheezing and stridor  Cardiovascular: Negative for chest pain, palpitations and leg swelling  Gastrointestinal: Positive for nausea  Negative for abdominal pain, anal bleeding, blood in stool, constipation, diarrhea, rectal pain and vomiting  Genitourinary: Positive for flank pain  Negative for dysuria  Musculoskeletal: Negative for gait problem, joint swelling, myalgias, neck pain and neck stiffness  Skin: Negative  Negative for color change, pallor, rash and wound  Neurological: Negative  Negative for headaches  Psychiatric/Behavioral: Negative  Negative for confusion  All other systems reviewed and are negative  Physical Exam  Physical Exam  Vitals signs and nursing note reviewed  Constitutional:       General: She is not in acute distress  Appearance: Normal appearance  She is normal weight  She is not ill-appearing or diaphoretic  HENT:      Head: Normocephalic and atraumatic  Nose: Nose normal  No congestion or rhinorrhea  Mouth/Throat:      Mouth: Mucous membranes are moist       Pharynx: Oropharynx is clear  No oropharyngeal exudate or posterior oropharyngeal erythema  Eyes:      Extraocular Movements: Extraocular movements intact  Conjunctiva/sclera: Conjunctivae normal       Pupils: Pupils are equal, round, and reactive to light  Neck:      Musculoskeletal: Normal range of motion and neck supple  Cardiovascular:      Rate and Rhythm: Normal rate and regular rhythm  Pulmonary:      Effort: Pulmonary effort is normal  No respiratory distress  Breath sounds: Normal breath sounds  No stridor  No wheezing, rhonchi or rales  Chest:      Chest wall: No tenderness  Abdominal:      General: Abdomen is flat  Bowel sounds are normal  There is no distension  Palpations: Abdomen is soft  Tenderness: There is abdominal tenderness (LLQ)  There is left CVA tenderness  There is no right CVA tenderness or guarding  Musculoskeletal: Normal range of motion  General: No swelling, tenderness or deformity  Lymphadenopathy:      Cervical: No cervical adenopathy  Skin:     General: Skin is warm and dry  Capillary Refill: Capillary refill takes less than 2 seconds  Coloration: Skin is not pale  Findings: No bruising, erythema or rash  Neurological:      General: No focal deficit present  Mental Status: She is alert and oriented to person, place, and time  Sensory: No sensory deficit  Motor: No weakness  Coordination: Coordination normal       Deep Tendon Reflexes: Reflexes normal    Psychiatric:         Mood and Affect: Mood normal          Behavior: Behavior normal          Thought Content: Thought content normal          Judgment: Judgment normal          Vital Signs  ED Triage Vitals [04/07/21 1614]   Temperature Pulse Respirations Blood Pressure SpO2   98 1 °F (36 7 °C) (!) 114 19 121/84 94 %      Temp Source Heart Rate Source Patient Position - Orthostatic VS BP Location FiO2 (%)   Temporal Monitor Lying Right arm --      Pain Score       6           Vitals:    04/07/21 1614 04/07/21 1730   BP: 121/84 115/72   Pulse: (!) 114 89   Patient Position - Orthostatic VS: Lying Lying         Visual Acuity      ED Medications  Medications   sodium chloride 0 9 % bolus 1,000 mL (0 mL Intravenous Stopped 4/7/21 1735)   ondansetron (ZOFRAN) injection 4 mg (4 mg Intravenous Given 4/7/21 1635)   cefTRIAXone (ROCEPHIN) IVPB (premix in dextrose) 1,000 mg 50 mL (0 mg Intravenous Stopped 4/7/21 1813)       Diagnostic Studies  Results Reviewed     Procedure Component Value Units Date/Time    Urine Microscopic [[id number]]  (Abnormal) Collected: 04/07/21 1700    Lab Status: Final result Specimen: Urine, Clean Catch Updated: 04/07/21 1714     RBC, UA 4-10 /hpf      WBC, UA 20-30 /hpf      Epithelial Cells Occasional /hpf      Bacteria, UA Moderate /hpf     Urine culture [[id number]] Collected: 04/07/21 1700    Lab Status: In process Specimen: Urine, Clean Catch Updated: 04/07/21 1714    Lactic acid [[id number]]  (Normal) Collected: 04/07/21 1633    Lab Status: Final result Specimen: Blood from Arm, Left Updated: 04/07/21 1705     LACTIC ACID 0 9 mmol/L     Narrative:      Result may be elevated if tourniquet was used during collection      UA w Reflex to Microscopic w Reflex to Culture [[id number]]  (Abnormal) Collected: 04/07/21 1700    Lab Status: Final result Specimen: Urine, Clean Catch Updated: 04/07/21 1705     Color, UA Yellow     Clarity, UA Cloudy     Specific Gravity, UA 1 015     pH, UA 6 5     Leukocytes, UA Large     Nitrite, UA Negative     Protein, UA Trace mg/dl      Glucose, UA Negative mg/dl      Ketones, UA 15 (1+) mg/dl      Urobilinogen, UA 0 2 E U /dl      Bilirubin, UA Small     Blood, UA Small    Comprehensive metabolic panel [[id number]]  (Abnormal) Collected: 04/07/21 1633    Lab Status: Final result Specimen: Blood from Arm, Left Updated: 04/07/21 1700     Sodium 134 mmol/L      Potassium 3 8 mmol/L      Chloride 98 mmol/L      CO2 27 mmol/L      ANION GAP 9 mmol/L      BUN 11 mg/dL      Creatinine 1 35 mg/dL      Glucose 114 mg/dL      Calcium 9 3 mg/dL      AST 19 U/L      ALT 26 U/L      Alkaline Phosphatase 159 U/L      Total Protein 8 0 g/dL      Albumin 3 5 g/dL      Total Bilirubin 0 82 mg/dL      eGFR 43 ml/min/1 73sq m     Narrative:      [place] guidelines for Chronic Kidney Disease (CKD):     Stage 1 with normal or high GFR (GFR > 90 mL/min/1 73 square meters)    Stage 2 Mild CKD (GFR = 60-89 mL/min/1 73 square meters)    Stage 3A Moderate CKD (GFR = 45-59 mL/min/1 73 square meters)    Stage 3B Moderate CKD (GFR = 30-44 mL/min/1 73 square meters)    Stage 4 Severe CKD (GFR = 15-29 mL/min/1 73 square meters)    Stage 5 End Stage CKD (GFR <15 mL/min/1 73 square meters)  Note: GFR calculation is accurate only with a steady state creatinine    CBC and differential [[id number]]  (Abnormal) Collected: 04/07/21 1633    Lab Status: Final result Specimen: Blood from Arm, Left Updated: 04/07/21 1645     WBC 11 84 Thousand/uL      RBC 4 34 Million/uL      Hemoglobin 12 4 g/dL      Hematocrit 38 3 %      MCV 88 fL      MCH 28 6 pg      MCHC 32 4 g/dL      RDW 12 9 %      MPV 10 1 fL      Platelets 561 Thousands/uL      nRBC 0 /100 WBCs      Neutrophils Relative 82 %      Immat GRANS % 0 %      Lymphocytes Relative 6 %      Monocytes Relative 10 %      Eosinophils Relative 1 %      Basophils Relative 1 %      Neutrophils Absolute 9 68 Thousands/µL      Immature Grans Absolute 0 04 Thousand/uL      Lymphocytes Absolute 0 76 Thousands/µL      Monocytes Absolute 1 18 Thousand/µL      Eosinophils Absolute 0 11 Thousand/µL      Basophils Absolute 0 07 Thousands/µL                  No orders to display              Procedures  Procedures         ED Course  ED Course as of Apr 07 1924 Wed Apr 07, 2021   1652 WBC(!): 11 84   1701 Noted JIN    Creatinine(!): 1 35   1706 UA significant for UTI      1709 TT sent to Rosendo Chicas urology PA-C      1760 [address] request transfer to [facility]  Discussed transfer with patient  Patient requesting transfer to [facility] after being offered all facility      1730 Patient accepted by Dr Rome Iglesias at [facility]      [id number] Patient resting comfortably  No complaints of pain at this time  1910 Patient continues to rest comfortably       1924 Transport at bedside                      MDM  Number of Diagnoses or Management Options  JIN (acute kidney injury) [facility]): new and requires workup  Kidney stone: new and requires workup  UTI (urinary tract infection): new and requires workup  Diagnosis management comments: 62 yo female presents to the emergency department due to fever  Patient was seen in the emergency department yesterday diagnosed with 6 mm obstructing kidney stone with mild hydronephrosis  Vitals and medical record reviewed  Afebrile on arrival Motrin prior to arrival   On exam patient has left lower quadrant abdominal tenderness and left CVA tenderness  Patient noted to have leukocytosis, UTI, JIN  Discussed with urology who recommended transfer for stent tomorrow  Patient requesting transfer to [facility]  Accepted by Dr Jacque Brooks attending at [facility]          Amount and/or Complexity of Data Reviewed  Clinical lab tests: ordered and reviewed  Review and summarize past medical records: yes  Discuss the patient with other providers: yes  Independent visualization of images, tracings, or specimens: yes        Disposition  Final diagnoses:   Kidney stone   UTI (urinary tract infection)   JIN (acute kidney injury) ([facility] [address] )     Time reflects when diagnosis was documented in both MDM as applicable and the Disposition within this note     Time User Action Codes Description Comment    4/7/2021  5:31 PM Tonya Yannick Add [N20 0] Kidney stone     4/7/2021  5:31 PM Tonya Noble Add [N39 0] UTI (urinary tract infection)     4/7/2021  5:31 PM Tonya Noble Add [N17 9] JIN (acute kidney injury) [facility])       ED Disposition     ED Disposition Condition Date/Time Comment    Transfer to Another Facility-In Network  Wed Apr 7, 2021  5:31 PM Bertha Larios should be transferred out to [facility]          MD Documentation      Most Recent Value   Patient Condition  The patient has been stabilized such that within reasonable medical probability, no material deterioration of the patient condition or the condition of the unborn child(gurmeet) is likely to result from the transfer   Reason for Transfer  Other (Include comment)____________________ [Urology]   Benefits of Transfer  Specialized equipment and/or services available at the receiving facility (Include comment)________________________   Risks of Transfer  Potential for delay in receiving treatment, Potential deterioration of medical condition, Loss of IV, Increased discomfort during transfer, Possible worsening of condition or death during transfer   Accepting Physician  Dr Katia Esparza Name, Amor Castellanos   Provider Certification  General risk, such as traffic hazards, adverse weather conditions, rough terrain or turbulence, possible failure of equipment (including vehicle or aircraft), or consequences of actions of persons outside the control of the transport personnel, Unanticipated needs of medical equipment and personnel during transport, Risk of worsening condition, The possibility of a transport vehicle being unavailable      RN Documentation      Most [address] Name, [address]  [facility]       Follow-up Information    None         Patient's Medications   Discharge Prescriptions    No medications on file     No discharge procedures on file      PDMP Review     None          ED Provider  Electronically Signed by           Enma Chaves PA-C  04/07/21 4889

## 2021-04-07 NOTE — TELEPHONE ENCOUNTER
Patient called stating she is having pain 7/10 level of pain   She was in ER yesterday and ct scan show kidney stones  Patient has an appointment next week to follow up   She would like to know what to do  She was not able to go to work today due to the pain  She would like to know if she needs to be seen sooner      Patient can be reached at [phone] (G)

## 2021-04-07 NOTE — TELEPHONE ENCOUNTER
Spoke to pt seen in ED yesterday for renal calculus  CT scan shows mildly obstructing 6 mm stone in proximal left ureter  Pt experiencing pain and asking what to do  She was given Percocet in ED but doesn't like to take it  She is taking 2 ibuprofen every 6 hrs  Advised pt to drink 40-60 oz of water daily and if pain becomes intolerable or if she is unable to urinate or starts to run fever she should proceed to ED for evaluation  She is scheduled to see Dr Inocencia Bear in [facility]  on 4/15  She was also advised that we have on call provider 24/7  Pt agreed to this plan

## 2021-04-08 ENCOUNTER — TELEPHONE (OUTPATIENT)
Dept: OTHER | Facility: HOSPITAL | Age: 61
End: 2021-04-08

## 2021-04-08 ENCOUNTER — ANESTHESIA EVENT (INPATIENT)
Dept: PERIOP | Facility: HOSPITAL | Age: 61
DRG: 853 | End: 2021-04-08
Payer: COMMERCIAL

## 2021-04-08 ENCOUNTER — APPOINTMENT (INPATIENT)
Dept: RADIOLOGY | Facility: HOSPITAL | Age: 61
DRG: 853 | End: 2021-04-08
Payer: COMMERCIAL

## 2021-04-08 ENCOUNTER — ANESTHESIA (INPATIENT)
Dept: PERIOP | Facility: HOSPITAL | Age: 61
DRG: 853 | End: 2021-04-08
Payer: COMMERCIAL

## 2021-04-08 ENCOUNTER — PREP FOR PROCEDURE (OUTPATIENT)
Dept: UROLOGY | Facility: CLINIC | Age: 61
End: 2021-04-08

## 2021-04-08 DIAGNOSIS — N20.0 LEFT RENAL STONE: Primary | ICD-10-CM

## 2021-04-08 PROBLEM — E66.9 OBESITY (BMI 30.0-34.9): Status: ACTIVE | Noted: 2021-04-08

## 2021-04-08 PROBLEM — K21.9 GASTROESOPHAGEAL REFLUX DISEASE WITHOUT ESOPHAGITIS: Status: ACTIVE | Noted: 2020-08-23

## 2021-04-08 PROBLEM — E78.5 HYPERLIPIDEMIA: Status: ACTIVE | Noted: 2021-04-08

## 2021-04-08 PROBLEM — C50.919 MALIGNANT NEOPLASM OF FEMALE BREAST (HCC): Status: ACTIVE | Noted: 2019-07-24

## 2021-04-08 LAB
ALBUMIN SERPL BCP-MCNC: 3 G/DL (ref 3.5–5)
ALP SERPL-CCNC: 154 U/L (ref 46–116)
ALT SERPL W P-5'-P-CCNC: 23 U/L (ref 12–78)
ANION GAP SERPL CALCULATED.3IONS-SCNC: 8 MMOL/L (ref 4–13)
AST SERPL W P-5'-P-CCNC: 16 U/L (ref 5–45)
BACTERIA UR CULT: NORMAL
BASOPHILS # BLD AUTO: 0.05 THOUSANDS/ΜL (ref 0–0.1)
BASOPHILS NFR BLD AUTO: 1 % (ref 0–1)
BILIRUB SERPL-MCNC: 0.79 MG/DL (ref 0.2–1)
BUN SERPL-MCNC: 9 MG/DL (ref 5–25)
CALCIUM ALBUM COR SERPL-MCNC: 9.6 MG/DL (ref 8.3–10.1)
CALCIUM SERPL-MCNC: 8.8 MG/DL (ref 8.3–10.1)
CHLORIDE SERPL-SCNC: 103 MMOL/L (ref 100–108)
CO2 SERPL-SCNC: 25 MMOL/L (ref 21–32)
CREAT SERPL-MCNC: 0.95 MG/DL (ref 0.6–1.3)
EOSINOPHIL # BLD AUTO: 0.03 THOUSAND/ΜL (ref 0–0.61)
EOSINOPHIL NFR BLD AUTO: 0 % (ref 0–6)
ERYTHROCYTE [DISTWIDTH] IN BLOOD BY AUTOMATED COUNT: 13.2 % (ref 11.6–15.1)
GFR SERPL CREATININE-BSD FRML MDRD: 66 ML/MIN/1.73SQ M
GLUCOSE SERPL-MCNC: 112 MG/DL (ref 65–140)
HCT VFR BLD AUTO: 35.6 % (ref 34.8–46.1)
HGB BLD-MCNC: 11.2 G/DL (ref 11.5–15.4)
IMM GRANULOCYTES # BLD AUTO: 0.05 THOUSAND/UL (ref 0–0.2)
IMM GRANULOCYTES NFR BLD AUTO: 1 % (ref 0–2)
LYMPHOCYTES # BLD AUTO: 0.58 THOUSANDS/ΜL (ref 0.6–4.47)
LYMPHOCYTES NFR BLD AUTO: 6 % (ref 14–44)
MCH RBC QN AUTO: 28.3 PG (ref 26.8–34.3)
MCHC RBC AUTO-ENTMCNC: 31.5 G/DL (ref 31.4–37.4)
MCV RBC AUTO: 90 FL (ref 82–98)
MONOCYTES # BLD AUTO: 1.21 THOUSAND/ΜL (ref 0.17–1.22)
MONOCYTES NFR BLD AUTO: 12 % (ref 4–12)
NEUTROPHILS # BLD AUTO: 8.33 THOUSANDS/ΜL (ref 1.85–7.62)
NEUTS SEG NFR BLD AUTO: 80 % (ref 43–75)
NRBC BLD AUTO-RTO: 0 /100 WBCS
PLATELET # BLD AUTO: 231 THOUSANDS/UL (ref 149–390)
PMV BLD AUTO: 10.2 FL (ref 8.9–12.7)
POTASSIUM SERPL-SCNC: 4 MMOL/L (ref 3.5–5.3)
PROT SERPL-MCNC: 7.2 G/DL (ref 6.4–8.2)
RBC # BLD AUTO: 3.96 MILLION/UL (ref 3.81–5.12)
SODIUM SERPL-SCNC: 136 MMOL/L (ref 136–145)
WBC # BLD AUTO: 10.25 THOUSAND/UL (ref 4.31–10.16)

## 2021-04-08 PROCEDURE — BT1F1ZZ FLUOROSCOPY OF LEFT KIDNEY, URETER AND BLADDER USING LOW OSMOLAR CONTRAST: ICD-10-PCS | Performed by: INTERNAL MEDICINE

## 2021-04-08 PROCEDURE — 52332 CYSTOSCOPY AND TREATMENT: CPT | Performed by: UROLOGY

## 2021-04-08 PROCEDURE — 74420 UROGRAPHY RTRGR +-KUB: CPT

## 2021-04-08 PROCEDURE — C1758 CATHETER, URETERAL: HCPCS | Performed by: UROLOGY

## 2021-04-08 PROCEDURE — 0T778DZ DILATION OF LEFT URETER WITH INTRALUMINAL DEVICE, VIA NATURAL OR ARTIFICIAL OPENING ENDOSCOPIC: ICD-10-PCS | Performed by: INTERNAL MEDICINE

## 2021-04-08 PROCEDURE — C1769 GUIDE WIRE: HCPCS | Performed by: UROLOGY

## 2021-04-08 PROCEDURE — C2617 STENT, NON-COR, TEM W/O DEL: HCPCS | Performed by: UROLOGY

## 2021-04-08 PROCEDURE — 87086 URINE CULTURE/COLONY COUNT: CPT | Performed by: UROLOGY

## 2021-04-08 PROCEDURE — 80053 COMPREHEN METABOLIC PANEL: CPT | Performed by: INTERNAL MEDICINE

## 2021-04-08 PROCEDURE — 99255 IP/OBS CONSLTJ NEW/EST HI 80: CPT | Performed by: UROLOGY

## 2021-04-08 PROCEDURE — 99233 SBSQ HOSP IP/OBS HIGH 50: CPT | Performed by: INTERNAL MEDICINE

## 2021-04-08 PROCEDURE — 85025 COMPLETE CBC W/AUTO DIFF WBC: CPT | Performed by: INTERNAL MEDICINE

## 2021-04-08 DEVICE — INLAY OPTIMA URETERAL STENT W/O GUIDEWIRE
Type: IMPLANTABLE DEVICE | Status: FUNCTIONAL
Brand: BARD® INLAY OPTIMA® URETERAL STENT
Removed: 2021-04-20

## 2021-04-08 RX ORDER — KETOROLAC TROMETHAMINE 30 MG/ML
INJECTION, SOLUTION INTRAMUSCULAR; INTRAVENOUS AS NEEDED
Status: DISCONTINUED | OUTPATIENT
Start: 2021-04-08 | End: 2021-04-08

## 2021-04-08 RX ORDER — PROPOFOL 10 MG/ML
INJECTION, EMULSION INTRAVENOUS AS NEEDED
Status: DISCONTINUED | OUTPATIENT
Start: 2021-04-08 | End: 2021-04-08

## 2021-04-08 RX ORDER — FENTANYL CITRATE/PF 50 MCG/ML
25 SYRINGE (ML) INJECTION
Status: DISCONTINUED | OUTPATIENT
Start: 2021-04-08 | End: 2021-04-08 | Stop reason: HOSPADM

## 2021-04-08 RX ORDER — CEFTRIAXONE 1 G/50ML
1000 INJECTION, SOLUTION INTRAVENOUS EVERY 24 HOURS
Status: CANCELLED | OUTPATIENT
Start: 2021-05-19

## 2021-04-08 RX ORDER — DEXAMETHASONE SODIUM PHOSPHATE 10 MG/ML
INJECTION, SOLUTION INTRAMUSCULAR; INTRAVENOUS AS NEEDED
Status: DISCONTINUED | OUTPATIENT
Start: 2021-04-08 | End: 2021-04-08

## 2021-04-08 RX ORDER — MAGNESIUM HYDROXIDE 1200 MG/15ML
LIQUID ORAL AS NEEDED
Status: DISCONTINUED | OUTPATIENT
Start: 2021-04-08 | End: 2021-04-08 | Stop reason: HOSPADM

## 2021-04-08 RX ORDER — SODIUM CHLORIDE, SODIUM LACTATE, POTASSIUM CHLORIDE, CALCIUM CHLORIDE 600; 310; 30; 20 MG/100ML; MG/100ML; MG/100ML; MG/100ML
125 INJECTION, SOLUTION INTRAVENOUS CONTINUOUS
Status: CANCELLED | OUTPATIENT
Start: 2021-04-08

## 2021-04-08 RX ORDER — LIDOCAINE HYDROCHLORIDE 10 MG/ML
INJECTION, SOLUTION EPIDURAL; INFILTRATION; INTRACAUDAL; PERINEURAL AS NEEDED
Status: DISCONTINUED | OUTPATIENT
Start: 2021-04-08 | End: 2021-04-08

## 2021-04-08 RX ORDER — MIDAZOLAM HYDROCHLORIDE 2 MG/2ML
INJECTION, SOLUTION INTRAMUSCULAR; INTRAVENOUS AS NEEDED
Status: DISCONTINUED | OUTPATIENT
Start: 2021-04-08 | End: 2021-04-08

## 2021-04-08 RX ORDER — ONDANSETRON 2 MG/ML
4 INJECTION INTRAMUSCULAR; INTRAVENOUS ONCE AS NEEDED
Status: DISCONTINUED | OUTPATIENT
Start: 2021-04-08 | End: 2021-04-08 | Stop reason: HOSPADM

## 2021-04-08 RX ORDER — ACETAMINOPHEN 325 MG/1
650 TABLET ORAL EVERY 6 HOURS PRN
Status: DISCONTINUED | OUTPATIENT
Start: 2021-04-08 | End: 2021-04-09 | Stop reason: HOSPADM

## 2021-04-08 RX ORDER — SODIUM CHLORIDE, SODIUM LACTATE, POTASSIUM CHLORIDE, CALCIUM CHLORIDE 600; 310; 30; 20 MG/100ML; MG/100ML; MG/100ML; MG/100ML
20 INJECTION, SOLUTION INTRAVENOUS CONTINUOUS
Status: CANCELLED | OUTPATIENT
Start: 2021-04-08

## 2021-04-08 RX ORDER — OXYBUTYNIN CHLORIDE 5 MG/1
5 TABLET, EXTENDED RELEASE ORAL DAILY
Status: DISCONTINUED | OUTPATIENT
Start: 2021-04-08 | End: 2021-04-09 | Stop reason: HOSPADM

## 2021-04-08 RX ORDER — LIDOCAINE HYDROCHLORIDE 20 MG/ML
JELLY TOPICAL AS NEEDED
Status: DISCONTINUED | OUTPATIENT
Start: 2021-04-08 | End: 2021-04-08 | Stop reason: HOSPADM

## 2021-04-08 RX ORDER — SODIUM CHLORIDE, SODIUM LACTATE, POTASSIUM CHLORIDE, CALCIUM CHLORIDE 600; 310; 30; 20 MG/100ML; MG/100ML; MG/100ML; MG/100ML
INJECTION, SOLUTION INTRAVENOUS CONTINUOUS PRN
Status: DISCONTINUED | OUTPATIENT
Start: 2021-04-08 | End: 2021-04-08

## 2021-04-08 RX ORDER — PHENAZOPYRIDINE HYDROCHLORIDE 100 MG/1
100 TABLET, FILM COATED ORAL
Status: DISPENSED | OUTPATIENT
Start: 2021-04-08 | End: 2021-04-09

## 2021-04-08 RX ORDER — ONDANSETRON 2 MG/ML
INJECTION INTRAMUSCULAR; INTRAVENOUS AS NEEDED
Status: DISCONTINUED | OUTPATIENT
Start: 2021-04-08 | End: 2021-04-08

## 2021-04-08 RX ORDER — FENTANYL CITRATE 50 UG/ML
INJECTION, SOLUTION INTRAMUSCULAR; INTRAVENOUS AS NEEDED
Status: DISCONTINUED | OUTPATIENT
Start: 2021-04-08 | End: 2021-04-08

## 2021-04-08 RX ADMIN — HEPARIN SODIUM 5000 UNITS: 5000 INJECTION INTRAVENOUS; SUBCUTANEOUS at 06:07

## 2021-04-08 RX ADMIN — CEFTRIAXONE 2000 MG: 2 INJECTION, POWDER, FOR SOLUTION INTRAMUSCULAR; INTRAVENOUS at 18:12

## 2021-04-08 RX ADMIN — HEPARIN SODIUM 5000 UNITS: 5000 INJECTION INTRAVENOUS; SUBCUTANEOUS at 21:17

## 2021-04-08 RX ADMIN — ACETAMINOPHEN 650 MG: 325 TABLET, FILM COATED ORAL at 06:07

## 2021-04-08 RX ADMIN — HEPARIN SODIUM 5000 UNITS: 5000 INJECTION INTRAVENOUS; SUBCUTANEOUS at 14:47

## 2021-04-08 RX ADMIN — SODIUM CHLORIDE, SODIUM GLUCONATE, SODIUM ACETATE, POTASSIUM CHLORIDE, MAGNESIUM CHLORIDE, SODIUM PHOSPHATE, DIBASIC, AND POTASSIUM PHOSPHATE 100 ML/HR: .53; .5; .37; .037; .03; .012; .00082 INJECTION, SOLUTION INTRAVENOUS at 07:46

## 2021-04-08 RX ADMIN — PANTOPRAZOLE SODIUM 40 MG: 40 TABLET, DELAYED RELEASE ORAL at 06:07

## 2021-04-08 RX ADMIN — LIDOCAINE HYDROCHLORIDE 200 MG: 10 INJECTION, SOLUTION EPIDURAL; INFILTRATION; INTRACAUDAL; PERINEURAL at 09:27

## 2021-04-08 RX ADMIN — SODIUM CHLORIDE, SODIUM LACTATE, POTASSIUM CHLORIDE, AND CALCIUM CHLORIDE: .6; .31; .03; .02 INJECTION, SOLUTION INTRAVENOUS at 09:24

## 2021-04-08 RX ADMIN — DEXAMETHASONE SODIUM PHOSPHATE 5 MG: 10 INJECTION, SOLUTION INTRAMUSCULAR; INTRAVENOUS at 09:36

## 2021-04-08 RX ADMIN — FENTANYL CITRATE 100 MCG: 50 INJECTION INTRAMUSCULAR; INTRAVENOUS at 09:27

## 2021-04-08 RX ADMIN — ONDANSETRON 4 MG: 2 INJECTION INTRAMUSCULAR; INTRAVENOUS at 09:36

## 2021-04-08 RX ADMIN — PHENAZOPYRIDINE 100 MG: 100 TABLET ORAL at 18:19

## 2021-04-08 RX ADMIN — KETOROLAC TROMETHAMINE 15 MG: 30 INJECTION, SOLUTION INTRAMUSCULAR at 09:51

## 2021-04-08 RX ADMIN — MIDAZOLAM 2 MG: 1 INJECTION INTRAMUSCULAR; INTRAVENOUS at 09:26

## 2021-04-08 RX ADMIN — OXYBUTYNIN 5 MG: 5 TABLET, FILM COATED, EXTENDED RELEASE ORAL at 14:47

## 2021-04-08 RX ADMIN — OXYCODONE HYDROCHLORIDE AND ACETAMINOPHEN 1 TABLET: 5; 325 TABLET ORAL at 21:16

## 2021-04-08 RX ADMIN — SODIUM CHLORIDE, SODIUM GLUCONATE, SODIUM ACETATE, POTASSIUM CHLORIDE, MAGNESIUM CHLORIDE, SODIUM PHOSPHATE, DIBASIC, AND POTASSIUM PHOSPHATE 100 ML/HR: .53; .5; .37; .037; .03; .012; .00082 INJECTION, SOLUTION INTRAVENOUS at 21:21

## 2021-04-08 RX ADMIN — TAMSULOSIN HYDROCHLORIDE 0.4 MG: 0.4 CAPSULE ORAL at 18:19

## 2021-04-08 RX ADMIN — SODIUM CHLORIDE, SODIUM GLUCONATE, SODIUM ACETATE, POTASSIUM CHLORIDE, MAGNESIUM CHLORIDE, SODIUM PHOSPHATE, DIBASIC, AND POTASSIUM PHOSPHATE 100 ML/HR: .53; .5; .37; .037; .03; .012; .00082 INJECTION, SOLUTION INTRAVENOUS at 10:56

## 2021-04-08 RX ADMIN — PROPOFOL 150 MG: 10 INJECTION, EMULSION INTRAVENOUS at 09:27

## 2021-04-08 NOTE — PLAN OF CARE
Problem: PAIN - ADULT  Goal: Verbalizes/displays adequate comfort level or baseline comfort level  Description: Interventions:  - Encourage patient to monitor pain and request assistance  - Assess pain using appropriate pain scale  - Administer analgesics based on type and severity of pain and evaluate response  - Implement non-pharmacological measures as appropriate and evaluate response  - Consider cultural and social influences on pain and pain management  - Notify physician/advanced practitioner if interventions unsuccessful or patient reports new pain  Outcome: Progressing     Problem: INFECTION - ADULT  Goal: Absence or prevention of progression during hospitalization  Description: INTERVENTIONS:  - Assess and monitor for signs and symptoms of infection  - Monitor lab/diagnostic results  - Monitor all insertion sites, i e  indwelling lines, tubes, and drains  - Monitor endotracheal if appropriate and nasal secretions for changes in amount and color  - [place] appropriate cooling/warming therapies per order  - Administer medications as ordered  - Instruct and encourage patient and family to use good hand hygiene technique  - Identify and instruct in appropriate isolation precautions for identified infection/condition  Outcome: Progressing  Goal: Absence of fever/infection during neutropenic period  Description: INTERVENTIONS:  - Monitor WBC    Outcome: Progressing     Problem: SAFETY ADULT  Goal: Patient will remain free of falls  Description: INTERVENTIONS:  - Assess patient frequently for physical needs  -  Identify cognitive and physical deficits and behaviors that affect risk of falls    -  [place] fall precautions as indicated by assessment   - Educate patient/family on patient safety including physical limitations  - Instruct patient to call for assistance with activity based on assessment  - Modify environment to reduce risk of injury  - Consider OT/PT consult to assist with strengthening/mobility  Outcome: Progressing  Goal: Maintain or return to baseline ADL function  Description: INTERVENTIONS:  -  Assess patient's ability to carry out ADLs; assess patient's baseline for ADL function and identify physical deficits which impact ability to perform ADLs (bathing, care of mouth/teeth, toileting, grooming, dressing, etc )  - Assess/evaluate cause of self-care deficits   - Assess range of motion  - Assess patient's mobility; develop plan if impaired  - Assess patient's need for assistive devices and provide as appropriate  - Encourage maximum independence but intervene and supervise when necessary  - Involve family in performance of ADLs  - Assess for home care needs following discharge   - Consider OT consult to assist with ADL evaluation and planning for discharge  - Provide patient education as appropriate  Outcome: Progressing  Goal: Maintain or return mobility status to optimal level  Description: INTERVENTIONS:  - Assess patient's baseline mobility status (ambulation, transfers, stairs, etc )    - Identify cognitive and physical deficits and behaviors that affect mobility  - Identify mobility aids required to assist with transfers and/or ambulation (gait belt, sit-to-stand, lift, walker, cane, etc )  - [place] fall precautions as indicated by assessment  - Record patient progress and toleration of activity level on Mobility SBAR; progress patient to next Phase/Stage  - Instruct patient to call for assistance with activity based on assessment  - Consider rehabilitation consult to assist with strengthening/weightbearing, etc   Outcome: Progressing     Problem: DISCHARGE PLANNING  Goal: Discharge to home or other facility with appropriate resources  Description: INTERVENTIONS:  - Identify barriers to discharge w/patient and caregiver  - Arrange for needed discharge resources and transportation as appropriate  - Identify discharge learning needs (meds, wound care, etc )  - Arrange for interpretive services to assist at discharge as needed  - Refer to Case Management Department for coordinating discharge planning if the patient needs post-hospital services based on physician/advanced practitioner order or complex needs related to functional status, cognitive ability, or social support system  Outcome: Progressing     Problem: Knowledge Deficit  Goal: Patient/family/caregiver demonstrates understanding of disease process, treatment plan, medications, and discharge instructions  Description: Complete learning assessment and assess knowledge base    Interventions:  - Provide teaching at level of understanding  - Provide teaching via preferred learning methods  Outcome: Progressing

## 2021-04-08 NOTE — ASSESSMENT & PLAN NOTE
Patient diagnosed with left ureteral stone yesterday  Was discharged Bactrim  Had fever today so presented to ER again as per urology advised  Urology was called from ER and they recommended patient be transferred here for evaluation and possible intervention  Bactrim switched to Ceftriaxone  Blood cultures negative  Urine cultures show mixed contaminate  Second cultures drawn 04/08- pending results  IV fluids  CYSTOSCOPY RETROGRADE PYELOGRAM WITH INSERTION  LEFT J STENT URETERAL without string, completed 04/08  24 hour observation overnight, pt doing well without complications    Follow up with urology outpatient

## 2021-04-08 NOTE — OP NOTE
OPERATIVE REPORT  PATIENT NAME: Terence Johnson    :  1960  MRN: [id number]  Pt Location: [facility] CYSTO ROOM 01    SURGERY DATE: 2021    Surgeon(s) and Role:     * Marya Naqvi MD - Primary    Preop Diagnosis:  Left ureteral stone [N20 1]  Left ureteral stone [N20 1]    Post-Op Diagnosis Codes:     * Left ureteral stone [N20 1]     * Left ureteral stone [N20 1]    Procedure(s) (LRB):  CYSTOSCOPY RETROGRADE PYELOGRAM WITH INSERTION STENT URETERAL (Left)    Specimen(s):  ID Type Source Tests Collected by Time Destination   A :  Urine Kidney, Left URINE CULTURE Marya Naqvi MD 2021 [id number]        Estimated Blood Loss:   Minimal    Drains:  6 x 26 left double-J stent with no string    Anesthesia Type:   General    Operative Indications:  Left ureteral stone [N20 1]  Left ureteral stone [N20 1]      Operative Findings:  1  Bloody purulent drainage from the left collecting system sample for culture  2  6 x 26 left double-J stent placed without string    Complications:   None    Procedure and Technique:  Terence Johnson is a 63y o -year-old female who presented and was found to have a 6 mm left proximal ureteral calculus  Patient also had significant stone burden in the collecting system  Risk and benefits of cystoscopy with left ureteral stent insertion were discussed and reviewed  The patient understands that I will not remove their stone at this time because questionable concern for infection with an abnormal urinalysis and that they will require interval ureteroscopy in the future  We discussed that ureteral stent cannot be consider permanent and will need to be removed or exchanged within 3-6 months  Informed consent was obtained  The patient was brought to the operating room on 2021   After the smooth induction of general LMA anesthesia, the patient was placed in the dorsal lithotomy position  Her genitalia was prepped and draped in a sterile fashion    Venodyne boots had been applied  Intravenous antibiotics were administered in the form of ceftriaxone  A timeout was performed with all members of the operative team confirming the patient's identity, procedure to be performed, and laterality of the case  A 24 Croatian rigid cystoscope with 30° lens was inserted  The bladder was thoroughly inspected  Attention was focused on the left ureteral orifice   fluoroscopy demonstrated a non radioopaque stone  A Bard Solo Plus Wire was placed into the collecting system and passed proximal to the stone and cloudy-appearing urine came from the left ureteral orifice  I then advanced the 5 [place] open-ended catheter over the wire and into the left renal pelvis  The wire was removed and there was bloody purulent hydronephrotic drip  This was collected and sent for culture  A gentle retrograde pyelogram was performed confirming mild hydronephrosis  There were no other filling defects identified  A wire was reinserted and placed into the upper pole calyx  A 6 Croatian 26 double-J ureteral stent was then placed in the standard fashion  The proximal coil was appreciated in the left renal pelvis and the distal coil was visualized within the bladder  There was no string left in place  The bladder was emptied and the cystoscope was removed  2% viscous lidocaine was placed per urethra  Overall the patient tolerated the procedure well and there were no complications  The patient was extubated in the operating room and transferred to the PACU in stable condition at the conclusion of the case  Plan-the patient will be monitored for infection treated with antibiotics  Will undergo secondary surgery for definitive ureteroscopy at a later date       I was present for the entire procedure    Patient Disposition:  PACU     SIGNATURE: Trudi Mcbride MD  DATE: April 8, 2021  TIME: 10:04 AM

## 2021-04-08 NOTE — ASSESSMENT & PLAN NOTE
POA  Fever, tachycardia, leukocytosis, JIN, left pyelonephritis with source of infection  Patient immunocompromised as she is on Imuran  Urine cultures positive   Blood cultures pending   Antibiotics as above  Afebrile today  Lactic acid 0 9 normal range   Pt is asymptomatic    Sepsis resolved

## 2021-04-08 NOTE — PROGRESS NOTES
[address] Internal Medicine Progress Note  Patient: Elis Madsen 63 y o  female   MRN: [id number]  PCP: Emily Valdez MD  Unit/Bed#: [address] [place] [address] 822-01 Encounter: [id number]  Date Of Visit: 04/08/21    Assessment/Plan:    * Obstructing left ureteral stone with mild hydronephrosis and pyelonephritis  Assessment & Plan  Patient diagnosed with left ureteral stone yesterday  Was discharged Bactrim  Had fever today so presented to ER again as per urology advised  Urology was called from ER and they recommended patient be transferred here for evaluation and possible intervention  Bactrim switched to Ceftriaxone  Urine cultures positive   Blood cultures pending  Urology consulted  NPO past midnight  IV fluids  CYSTOSCOPY RETROGRADE PYELOGRAM WITH INSERTION  LEFT J STENT URETERAL without string, completed today    Sepsis ([facility] [address] [address] )  Assessment & Plan  POA  Fever, tachycardia, leukocytosis, JIN, left pyelonephritis with source of infection  Patient immunocompromised as she is on Imuran  Urine cultures positive   Blood cultures pending   Antibiotics as above  Afebrile today  Lactic acid 0 9 normal range     Acute kidney injury ([facility] [address] )  Assessment & Plan  Creatinine increased to 1 3  4/07  Suspect due to ATN from sepsis/urinary obstruction  Will continue with IV fluids for now  Creatinine 0 95 today     Rheumatoid arthritis [facility])  Assessment & Plan  Patient on Imuran  Will hold for now given acute infection  VTE Pharmacologic Prophylaxis:   Pharmacologic: Heparin  Mechanical VTE Prophylaxis in Place: Yes    Patient Centered Rounds: I have performed bedside rounds with nursing staff today  Discussions with Specialists or Other Care Team Provider:     Education and Discussions with Family / Patient:     Time Spent for Care: 30 minutes  More than 50% of total time spent on counseling and coordination of care as described above      Current Length of Stay: 1 day(s)    Current Patient Status: Inpatient Certification Statement: The patient will continue to require additional inpatient hospital stay due to symptomatic renal stones    Discharge Plan / Estimated Discharge Date: unknown    Code Status: Level 1 - Full Code      Subjective: On examination today, Pt stated that she only has a mild headache  She is feeling well, after her procedure  Review of Systems   Constitutional: Negative  HENT: Negative  Eyes: Negative  Respiratory: Negative  Cardiovascular: Negative  Gastrointestinal: Negative  Endocrine: Negative  Genitourinary: Negative  Musculoskeletal: Negative  Skin: Negative  Allergic/Immunologic: Negative  Neurological: Positive for headaches  Hematological: Negative  Psychiatric/Behavioral: Negative  Objective:     Vitals:   Temp (24hrs), Av 7 °F (37 1 °C), Min:97 6 °F (36 4 °C), Max:101 7 °F (38 7 °C)    Temp:  [97 6 °F (36 4 °C)-101 7 °F (38 7 °C)] 97 6 °F (36 4 °C)  HR:  [] 81  Resp:  [11-20] 17  BP: ()/(52-89) 103/66  SpO2:  [93 %-100 %] 93 %  Body mass index is 33 99 kg/m²  Input and Output Summary (last 24 hours): Intake/Output Summary (Last 24 hours) at 2021 1409  Last data filed at 2021 1243  Gross per 24 hour   Intake 1843 33 ml   Output 1100 ml   Net 743 33 ml       Physical Exam:     Physical Exam  Constitutional:       Appearance: Normal appearance  HENT:      Head: Normocephalic and atraumatic  Nose: Nose normal       Mouth/Throat:      Mouth: Mucous membranes are moist    Eyes:      Extraocular Movements: Extraocular movements intact  Conjunctiva/sclera: Conjunctivae normal       Pupils: Pupils are equal, round, and reactive to light  Neck:      Musculoskeletal: Normal range of motion and neck supple  Cardiovascular:      Rate and Rhythm: Normal rate and regular rhythm  Pulmonary:      Effort: Pulmonary effort is normal       Breath sounds: Normal breath sounds     Abdominal: Palpations: Abdomen is soft  Musculoskeletal: Normal range of motion  Skin:     General: Skin is warm and dry  Neurological:      General: No focal deficit present  Mental Status: She is alert and oriented to person, place, and time  Psychiatric:         Mood and Affect: Mood normal          Behavior: Behavior normal          Thought Content: Thought content normal          Judgment: Judgment normal          Additional Data:     Labs:    Results from last 7 days   Lab Units 04/08/21  0456   WBC Thousand/uL 10 25*   HEMOGLOBIN g/dL 11 2*   HEMATOCRIT % 35 6   PLATELETS Thousands/uL 231   NEUTROS PCT % 80*   LYMPHS PCT % 6*   MONOS PCT % 12   EOS PCT % 0     Results from last 7 days   Lab Units 04/08/21  0456   POTASSIUM mmol/L 4 0   CHLORIDE mmol/L 103   CO2 mmol/L 25   BUN mg/dL 9   CREATININE mg/dL 0 95   CALCIUM mg/dL 8 8   ALK PHOS U/L 154*   ALT U/L 23   AST U/L 16           * I Have Reviewed All Lab Data Listed Above  * Additional Pertinent Lab Tests Reviewed: All Labs Within Last 24 Hours Reviewed    Imaging:    Imaging Reports Reviewed Today Include: CT abd/pelvis  Imaging Personally Reviewed by Myself Includes:      Recent Cultures (last 7 days):     Results from last 7 days   Lab Units 04/07/21  2105 04/06/21  0751   BLOOD CULTURE  Received in Microbiology Lab  Culture in Progress  Received in Microbiology Lab  Culture in Progress    --    URINE CULTURE   --  30,000-39,000 cfu/ml        Last 24 Hours Medication List:   Current Facility-Administered Medications   Medication Dose Route Frequency Provider Last Rate    acetaminophen  650 mg Oral Q6H PRN Saadia Yeh MD      cefTRIAXone  2,000 mg Intravenous Q24H Saadia Yeh MD      ezetimibe  10 mg Oral Daily Saadia Yeh MD      heparin (porcine)  5,000 Units Subcutaneous [facility] Saadia Yeh MD      montelukast  10 mg Oral Daily Saadia Yeh MD      multi-electrolyte  100 mL/hr Intravenous Continuous Constantino Antoine  mL/hr (04/08/21 1056)    oxybutynin  5 mg Oral Daily Constantino Antoine MD      oxyCODONE-acetaminophen  1 tablet Oral Q8H PRN Constantino Antoine MD      pantoprazole  40 mg Oral Early Morning Constantino Antoine MD      phenazopyridine  100 mg Oral TID With Meals Constantino Antoine MD      tamsulosin  0 4 mg Oral Daily With Juan David Eid MD          Today, Patient Was Seen By: Bhaskar Hager    ** Please Note: This note has been constructed using a voice recognition system   **

## 2021-04-08 NOTE — QUICK NOTE
Patient seen postoperatively   at bedside  Discussed findings of operation  Would observe patient overnight into tomorrow  She remains clinically stable and afebrile, I am comfortable with discharge home at that time  Patient understands she will need secondary surgery which has already been ordered  My office team will reach out to her

## 2021-04-08 NOTE — ASSESSMENT & PLAN NOTE
POA  Fever, tachycardia, leukocytosis, JIN, left pyelonephritis with source of infection  Patient immunocompromised as she is on Imuran  Follow-up urine cultures  Blood cultures not sent  Will reorder  Although low yield as patient already on antibiotics  Antibiotics as above

## 2021-04-08 NOTE — H&P
[facility]  H&P- Shannan Miss 1960, 63 y o  female MRN: [id number]  [facility] Encounter: [id number]  Primary Care Provider: Moris Monte MD   Date and time admitted to hospital: 4/7/2021  8:32 PM    * Obstructing left ureteral stone with mild hydronephrosis and pyelonephritis  Assessment & Plan  Patient diagnosed with left ureteral stone yesterday  Was discharged Bactrim  Had fever today so presented to ER again as per urology advised  Urology was called from ER and they recommended patient be transferred here for evaluation and possible intervention  Will continue with ceftriaxone  Follow up urine cultures  Blood cultures were not sent prior to antibiotics  Will send here  Although low yield at this point  Consult urology for intervention  NPO past midnight  IV fluids  Sepsis ([address] )  Assessment & Plan  POA  Fever, tachycardia, leukocytosis, JIN, left pyelonephritis with source of infection  Follow-up urine cultures  Blood cultures not sent  Will reorder  Although low yield as patient already on antibiotics  Antibiotics as above  Acute kidney injury ([address] )  Assessment & Plan  Creatinine increased to 1 3 today from 0 95 yesterday  Suspect due to ATN from sepsis/urinary obstruction  Will continue with IV fluids for now  Recheck BMP tomorrow  Rheumatoid arthritis [facility])  Assessment & Plan  Patient on Imuran  Will hold for now given acute infection  VTE Prophylaxis: Heparin  / sequential compression device   Code Status: full  POLST: POLST form is not discussed and not completed at this time  Discussion with family: pt    Anticipated Length of Stay:  Patient will be admitted on an Inpatient basis with an anticipated length of stay of  > 2 midnights  Justification for Hospital Stay: above    Total Time for Visit, including Counseling / Coordination of Care: 45 minutes    Greater than 50% of this total time spent on direct patient counseling and coordination of care  Chief Complaint:   transfer from [facility] for urology eval for left renal stone    History of Present Illness:    Daylin Mahan is a 63 y o  female who presents transfer from [facility] for urology evaluation for left renal stone with hydro and possible pyelonephritis  Patient presented to ER yesterday due to flank pain when she was diagnosed with 6 mm left-sided obstructing ureteral stone with mild hydronephrosis  Patient was discharged on Bactrim with outpatient Urology follow-up  Today patient call urologist office as she was having fever at home and she was recommended to present to ER for further evaluation  Review of Systems:    Review of Systems   Constitutional: Positive for fever  Negative for chills  HENT: Negative for congestion and sore throat  Respiratory: Negative for cough and shortness of breath  Cardiovascular: Negative for chest pain and palpitations  Gastrointestinal: Negative for abdominal pain, nausea and vomiting  Genitourinary: Positive for flank pain  Negative for dysuria, frequency and urgency  Musculoskeletal: Negative for arthralgias and myalgias  Skin: Negative for color change and rash  Neurological: Negative for dizziness and light-headedness  Psychiatric/Behavioral: Negative for agitation, behavioral problems and confusion  Past Medical and Surgical History:     Past Medical History:   Diagnosis Date    Breast cancer ([facility] [address] )     H/O cervical fracture     Rheumatoid arthritis ([facility] )        Past Surgical History:   Procedure Laterality Date    BLADDER SUSPENSION      BREAST LUMPECTOMY       SECTION      HYSTERECTOMY         Meds/Allergies:    Prior to Admission medications    Medication Sig Start Date End Date Taking?  Authorizing Provider   azaTHIOprine (IMURAN) 50 mg tablet Take 50 mg by mouth daily    Historical Provider, MD   ezetimibe (ZETIA) 10 mg tablet Take 10 mg by mouth daily 20  Historical Provider, MD   montelukast (SINGULAIR) 10 mg tablet Take 10 mg by mouth daily    Historical Provider, MD   oxyCODONE-acetaminophen (PERCOCET) 5-325 mg per tablet Take 1 tablet by mouth every 8 (eight) hours as needed for moderate pain for up to 10 daysMax Daily Amount: 3 tablets 21  Faye Fernandez MD   pantoprazole (PROTONIX) 40 mg tablet Take 40 mg by mouth daily 20   Historical Provider, MD   sulfamethoxazole-trimethoprim (BACTRIM DS) 800-160 mg per tablet Take 1 tablet by mouth 2 (two) times a day for 7 days smx-tmp DS (BACTRIM) 800-160 mg tabs (1tab q12 D10) 21  Faye Fernandez MD   tamsulosin (FLOMAX) 0 4 mg Take 2 capsules (0 8 mg total) by mouth daily with dinner for 7 days 21  Faye Fernandez MD     I have reviewed home medications with patient personally  Allergies: Allergies   Allergen Reactions    Penicillins Rash       Social History:     Marital Status: /Civil Union   Occupation:   Patient Pre-hospital Living Situation:   Patient Pre-hospital Level of Mobility:   Patient Pre-hospital Diet Restrictions:   Substance Use History:   Social History     Substance and Sexual Activity   Alcohol Use Yes    Frequency: Monthly or less    Drinks per session: 1 or 2     Social History     Tobacco Use   Smoking Status Former Smoker    Quit date:     Years since quittin 2   Smokeless Tobacco Never Used     Social History     Substance and Sexual Activity   Drug Use Not Currently       Family History:    No family history on file  Physical Exam:     Vitals:   Blood Pressure: 147/80 (21)  Pulse: 104 (21)  Temperature: 100 4 °F (38 °C) (21)  Temp Source: Oral (21)  Respirations: 18 (21)  Height: 5' 4" (162 6 cm) (21)  Weight - Scale: 89 8 kg (198 lb) (21)  SpO2: 98 % (21)    Physical Exam    Constitutional: Pt appears comfortable  Not in any acute distress  HENT:   Head: Normocephalic and atraumatic  Eyes: EOM are normal    Neck: Neck supple  Cardiovascular: Normal rate, regular rhythm, normal heart sounds  No murmur heard  Pulmonary/Chest: Effort normal, air entry b/l equal  No respiratory distress  Pt has no wheezes or crackles  Abdominal: Soft  Non-distended, Non-tender  Bowel sounds are normal  Left flank tenderness  Musculoskeletal: Normal range of motion  Neurological: awake, alert  Moving all extremities spontaneously  Psychiatric: normal mood and affect  Additional Data:     Lab Results: I have personally reviewed pertinent reports  Results from last 7 days   Lab Units 04/07/21  1633   WBC Thousand/uL 11 84*   HEMOGLOBIN g/dL 12 4   HEMATOCRIT % 38 3   PLATELETS Thousands/uL 277   NEUTROS PCT % 82*   LYMPHS PCT % 6*   MONOS PCT % 10   EOS PCT % 1     Results from last 7 days   Lab Units 04/07/21  1633   SODIUM mmol/L 134*   POTASSIUM mmol/L 3 8   CHLORIDE mmol/L 98*   CO2 mmol/L 27   BUN mg/dL 11   CREATININE mg/dL 1 35*   ANION GAP mmol/L 9   CALCIUM mg/dL 9 3   ALBUMIN g/dL 3 5   TOTAL BILIRUBIN mg/dL 0 82   ALK PHOS U/L 159*   ALT U/L 26   AST U/L 19   GLUCOSE RANDOM mg/dL 114                 Results from last 7 days   Lab Units 04/07/21  1633   LACTIC ACID mmol/L 0 9       Imaging: I have personally reviewed pertinent reports  No orders to display       EKG, Pathology, and Other Studies Reviewed on Admission:   · EKG:     Allscripts / Epic Records Reviewed: Yes     ** Please Note: This note has been constructed using a voice recognition system   **

## 2021-04-08 NOTE — ANESTHESIA POSTPROCEDURE EVALUATION
Post-Op Assessment Note    CV Status:  Stable  Pain Score: 0    Pain management: adequate     Mental Status:  Sleepy   Hydration Status:  Stable   PONV Controlled:  None   Airway Patency:  Patent      Post Op Vitals Reviewed: Yes      Staff: Anesthesiologist, CRNA         No complications documented      /76 (04/08/21 1000)    Temp 97 9 °F (36 6 °C) (04/08/21 1000)    Pulse 90 (04/08/21 1000)   Resp 20 (04/08/21 1000)    SpO2   100 face mask and oral airway

## 2021-04-08 NOTE — UTILIZATION REVIEW
Notification of Inpatient Admission/Inpatient Authorization Request   This is a Notification of Inpatient Admission for [address] [facility]  Be advised that this patient was admitted to our facility under Inpatient Status  Contact Mati Lennon at [phone] for additional admission information  [facility] DEPT  DEDICATED [phone]  Patient Name:   Anika Fowler   YOB: 1960       [address]   [address]:   [facility]  Tax ID: [id number]  NPI: [id number] Attending Provider/NPI:  Phone:  Address: [place] Bamberger, Khoa Cowan [[id number]]   [phone]  Same as MARÍA/Yifan Smith 1106 of Service Code: 24 Place of Service Name:  [address] [place] [address]   Start Date: 4/7/21 2032 Discharge Date & Time: No discharge date for patient encounter  Type of Admission: Inpatient Status Discharge Disposition (if discharged): [address] [place] [address]   Patient Diagnoses: Renal calculi [N20 0]  JIN (acute kidney injury) ([facility] [address] ) [N17 9]     Orders: Admission Orders (From admission, onward)     Ordered        04/07/21 2038  Inpatient Admission  Once                    Assigned Utilization Review Contact: Mati Lennon  Utilization   Network Utilization Review Department  Phone: [phone]; Fax [phone]  Email: [email or web address]  [email or web address]   ATTENTION PAYERS: Please call the assigned Utilization  directly with any questions or concerns ALL voicemails in the department are confidential  Send all requests for admission clinical reviews, approved or denied determinations and any other requests to dedicated fax number belonging to the campus where the patient is receiving treatment

## 2021-04-08 NOTE — ASSESSMENT & PLAN NOTE
Creatinine increased to 1 3  4/07  Suspect due to ATN from sepsis/urinary obstruction  Will continue with IV fluids for now  Creatinine 0 95 04/08  Normal renal function  Resolved

## 2021-04-08 NOTE — UTILIZATION REVIEW
Initial Clinical Review    Admission: Date/Time/Statement:   Admission Orders (From admission, onward)     Ordered        04/07/21 2038  Inpatient Admission  Once                   Orders Placed This Encounter   Procedures    Inpatient Admission     Standing Status:   Standing     Number of Occurrences:   1     Order Specific Question:   Level of Care     Answer:   Med Surg [16]     Order Specific Question:   Estimated length of stay     Answer:   More than 2 Midnights     Order Specific Question:   Certification     Answer:   I certify that inpatient services are medically necessary for this patient for a duration of greater than two midnights  See H&P and MD Progress Notes for additional information about the patient's course of treatment  Assessment/Plan: 62 yo fem w/hx breast ca, cervical fx,  rheum arthritis on imuran  Transferred by EMS from [facility] ED to [address] [place] [address] med surg unit, admitted as inpatient due to obstructing L ureteral stone w/hydro and sepsis from pyelonephritis  Presented to [address] [place] [address] ED a day prior due to flank pain  Imaging showed 6mm L obstructing ureteral stone and hydro, she was dc on bactrim with urology follow up  Developed fever on am of arrival date, called uro, and was sent back to ED  Decision then made to transfer to [facility] for higher level of care and urology evaluation/surgical intervention  On arrival to [facility], febrile, tachycardic with leukocytosis  Workup shows JIN  Urine c&s sent  Urology consulted, NPO at midnight, IVF, IV antbx,  Analgesics, antiemetics in progress  4/8 per urology: has L renal stone debris and L prox ureteral stone with perinephric stranding on CT  UA abnormal  Will take to OR for cysto, retrograde pyelogram, and L stent placement  Cont IVF, IV antbx, antiemetics, analgesics  To OR @0943:   CYSTOSCOPY RETROGRADE PYELOGRAM WITH INSERTION STENT URETERAL (Left)  General anesthesia  Operative Findings:1   Bloody purulent drainage from the left collecting system sample for culture  2  6 x 26 left double-J stent placed without string  Postop note: continue IV antbx, will need 2nd surgery for definitive ureteroscopy at a later date  Need to monitor overnight for infection/fever       Temperature Pulse Respirations Blood Pressure SpO2   04/07/21 2035 04/07/21 2035 04/07/21 2035 04/07/21 2035 04/07/21 2035   100 4 °F (38 °C) 104 18 147/80 98 %      Temp Source Heart Rate Source Patient Position - Orthostatic VS BP Location FiO2 (%)   04/07/21 2035 -- 04/07/21 2035 04/07/21 2035 --   Oral  Sitting Right arm       Pain Score       04/07/21 2037       5          Wt Readings from Last 1 Encounters:   04/07/21 89 8 kg (198 lb)     Additional Vital Signs:   Date/Time  Temp  Pulse  Resp  BP  MAP (mmHg)  SpO2  O2 Flow Rate (L/min)  O2 Device  Patient Position - Orthostatic VS   04/08/21 14:23:20  97 7 °F (36 5 °C)  82  16  100/66  77  95 %  --  --  --   04/08/21 1330  97 8 °F (36 6 °C)  80  18  104/62  --  94 %  --  --  --   04/08/21 12:43:06  97 6 °F (36 4 °C)  81  17  103/66  78  93 %  --  --  --   04/08/21 11:31:07  97 8 °F (36 6 °C)  87  18  99/66  77  100 %  --  None (Room air)  --   04/08/21 1100  --  80  15  98/52  --  98 %  --  Nasal cannula  --   04/08/21 1045  98 °F (36 7 °C)  80  20  98/65  --  98 %  2 L/min  Nasal cannula  --   04/08/21 1030  --  84  18  93/64  --  98 %  --  --  --   04/08/21 1015  --  88  11Abnormal   113/61  --  98 %  --  --  --   04/08/21 1000  97 9 °F (36 6 °C)  90  20  127/76  --  100 %  6 L/min  Simple mask  --   04/08/21 08:30:05  98 5 °F (36 9 °C)  --  --  --  --  --  --  --  --   04/08/21 07:41:31  --  --  --  98/66  77  --  --  --  --   04/08/21 05:57:07  --  103  18  104/65  78  95 %  --  --  --   04/08/21 05:56:51  --  106Abnormal   --  104/65  78  96 %  --  --  --   04/08/21 05:46:50  101 7 °F (38 7 °C)Abnormal   107Abnormal   --  --  --  93 %  --  --  --   04/07/21 23:11:10  98 5 °F (36 9 °C)  102  18 134/89  104  95 %  --  --  --       Pertinent Labs/Diagnostic Test Results:     4/6 CT stone study: Mildly obstructing 6 mm calculus in the proximal left ureter    Bilateral intrarenal calculi       No EKG    Results from last 7 days   Lab Units 04/08/21  0456 04/07/21  2106 04/07/21  1633 04/06/21  0757   WBC Thousand/uL 10 25*  --  11 84* 10 19*   HEMOGLOBIN g/dL 11 2*  --  12 4 12 3   HEMATOCRIT % 35 6  --  38 3 38 2   PLATELETS Thousands/uL 231 249 277 277   NEUTROS ABS Thousands/µL 8 33*  --  9 68* 7 36         Results from last 7 days   Lab Units 04/08/21  0456 04/07/21  1633 04/06/21  0757   SODIUM mmol/L 136 134* 140   POTASSIUM mmol/L 4 0 3 8 4 1   CHLORIDE mmol/L 103 98* 103   CO2 mmol/L 25 27 30   ANION GAP mmol/L 8 9 7   BUN mg/dL 9 11 15   CREATININE mg/dL 0 95 1 35* 0 95   EGFR ml/min/1 73sq m 66 43 66   CALCIUM mg/dL 8 8 9 3 8 8     Results from last 7 days   Lab Units 04/08/21  0456 04/07/21  1633 04/06/21  0757   AST U/L 16 19 15   ALT U/L 23 26 29   ALK PHOS U/L 154* 159* 134*   TOTAL PROTEIN g/dL 7 2 8 0 7 6   ALBUMIN g/dL 3 0* 3 5 3 7   TOTAL BILIRUBIN mg/dL 0 79 0 82 0 33         Results from last 7 days   Lab Units 04/08/21  0456 04/07/21  1633 04/06/21  0757   GLUCOSE RANDOM mg/dL 112 114 103       Results from last 7 days   Lab Units 04/07/21  1633   LACTIC ACID mmol/L 0 9       Results from last 7 days   Lab Units 04/07/21  1700 04/06/21  0751   CLARITY UA  Cloudy Clear   COLOR UA  Yellow Orange   SPEC GRAV UA  1 015 1 010   PH UA  6 5 6 5   GLUCOSE UA mg/dl Negative Negative   KETONES UA mg/dl 15 (1+)* Negative   BLOOD UA  Small* Trace-Intact*   PROTEIN UA mg/dl Trace* Trace*   NITRITE UA  Negative Positive*   BILIRUBIN UA  Small* Negative   UROBILINOGEN UA E U /dl 0 2 1 0   LEUKOCYTES UA  Large* Large*   WBC UA /hpf 20-30* 20-30*   RBC UA /hpf 4-10* 0-1   BACTERIA UA /hpf Moderate* Occasional   EPITHELIAL CELLS WET PREP /hpf Occasional Moderate*           Results from last 7 days   Lab Units 04/07/21  2105 04/06/21  0751   BLOOD CULTURE  Received in Microbiology Lab  Culture in Progress  Received in Microbiology Lab  Culture in Progress  --    URINE CULTURE   --  30,000-39,000 cfu/ml               Past Medical History:   Diagnosis Date    Breast cancer ([facility] )     H/O cervical fracture     Rheumatoid arthritis ([facility] )          Admitting Diagnosis: Renal calculi [N20 0]  JIN (acute kidney injury) ([place] [address] ) [N17 9]  Age/Sex: 63 y o  female  Admission Orders:  Scheduled Medications:  cefTRIAXone, 2,000 mg, Intravenous, Q24H  ezetimibe, 10 mg, Oral, Daily  heparin (porcine), 5,000 Units, Subcutaneous, Q8H [address]  montelukast, 10 mg, Oral, Daily  oxybutynin, 5 mg, Oral, Daily  pantoprazole, 40 mg, Oral, Early Morning  phenazopyridine, 100 mg, Oral, TID With Meals  tamsulosin, 0 4 mg, Oral, Daily With Dinner      Continuous IV Infusions:  multi-electrolyte, 100 mL/hr, Intravenous, Continuous      PRN Meds:  acetaminophen, 650 mg, Oral, Q6H PRN x 1 4/8  oxyCODONE-acetaminophen, 1 tablet, Oral, Q8H PRN x 1 4/7    scd's  House diet    IP CONSULT TO UROLOGY    Network Utilization Review Department  ATTENTION: Please call with any questions or concerns to [phone] and carefully listen to the prompts so that you are directed to the right person  All voicemails are confidential   Gregoria Bee all requests for admission clinical reviews, approved or denied determinations and any other requests to dedicated fax number below belonging to the campus where the patient is receiving treatment   List of dedicated fax numbers for the Facilities:  [address] DENIALS (Administrative/Medical Necessity) [phone]   [address] (Maternity/NICU/Pediatrics) 261 [facility],[address] [place] [address] [address] ([address]) [address] [place] [address] Martha Paige 1481 [phone]   [address] 951 [phone]

## 2021-04-08 NOTE — ANESTHESIA PREPROCEDURE EVALUATION
Procedure:  CYSTOSCOPY RETROGRADE PYELOGRAM WITH INSERTION STENT URETERAL (Left Bladder)    Relevant Problems   CARDIO   (+) Hyperlipidemia      GI/HEPATIC   (+) Gastroesophageal reflux disease without esophagitis      /RENAL   (+) Acute kidney injury ([address] )   (+) Hydronephrosis of left kidney      GYN   (+) Malignant neoplasm of female breast (HCC)      MUSCULOSKELETAL   (+) Rheumatoid arthritis (HCC)      Other   (+) Obesity (BMI 30 0-34 9)   (+) Obstructing left ureteral stone with mild hydronephrosis and pyelonephritis        Physical Exam    Airway    Mallampati score: III  TM Distance: >3 FB  Neck ROM: full     Dental   No notable dental hx     Cardiovascular      Pulmonary      Other Findings        Anesthesia Plan  ASA Score- 3     Anesthesia Type- general with ASA Monitors  Additional Monitors:   Airway Plan: LMA  Plan Factors-    Chart reviewed  Existing labs reviewed  Patient summary reviewed  Induction- intravenous  Postoperative Plan-     Informed Consent- Anesthetic plan and risks discussed with patient  I personally reviewed this patient with the CRNA  Discussed and agreed on the Anesthesia Plan with the CRNA  Michelle Silva

## 2021-04-08 NOTE — ASSESSMENT & PLAN NOTE
Creatinine increased to 1 3 today from 0 95 yesterday  Suspect due to ATN from sepsis/urinary obstruction  Will continue with IV fluids for now  Recheck BMP tomorrow

## 2021-04-08 NOTE — CONSULTS
Consults: UROLOGY  Mariela Kinney 63 y o  female [id number]   Unit/Bed #: [facility] 822-01  Encounter: [id number]        Assessment  & Plan  :  Nephrolithiasis:  -CT scan reveals a 6 mm calculus in the proximal left ureter at the level of the superior endplate of L4  Causing mild hydronephrosis with mild perinephric stranding  There is a staghorn calculus in the upper pole of the kidney measuring up to 2 6 cm and a 3 mm calculus in the lower pole  Right-sided puncture calculus in the lower pole of the kidney  No hydronephrosis or hydroureter  Small cyst in the upper pole  -UA and micro positive for bacteria , blood and pyuria  -creatinine 0 95  Patient had an JIN yesterday of 1 35 now down to 0 95  -NPO in sips with meds  IV fluids, IV antibiotics, antiemetics, analgesics,  -plan for cystoscopy ureteroscopy and stent placement later this meghann Baptiste -Discussed with patient cystoscopy and ureteroscopy and in the possibility of going home with a stent with a string or stent without a string  Discussed with patient that there is always a risk of bleeding, infection, damage to nearby structures and additional stone procedures  -patient is agreeable to plan  Will proceed with surgical intervention at this time  Subjective :    Mariela Kinney  is a 63 y o  female presented to the emergency room 2 days ago for flank pain  Patient reports that she has been having this flank pain since Monday  Patient was to follow-up outpatient with our urology office  Patient had a scheduled appointment to follow up however, patient began to experience fevers and chills  Urology office recommended patient go to the emergency room for re-evaluation  CT scan reveals a 6 mm left proximal stone with mild hydronephrosis and mild perinephric stranding  There is a staghorn calculus in the upper pole of the kidney measuring 2 who 0 6 cm and a 3 mm calculus in the lower pole    Patient was then transferred to [facility] for surgical intervention  Patient reports that she has a history of kidney stones  She usually passes these on her own and has never had to have surgical intervention until now  Patient denies ever following with a urologist in the past               Allergies   Allergen Reactions    Penicillins Rash      Current Outpatient Medications   Medication Instructions    azaTHIOprine (IMURAN) 50 mg, Oral, Daily    ezetimibe (ZETIA) 10 mg, Oral, Daily    montelukast (SINGULAIR) 10 mg, Oral, Daily    oxyCODONE-acetaminophen (PERCOCET) 5-325 mg per tablet 1 tablet, Oral, Every 8 hours PRN    pantoprazole (PROTONIX) 40 mg, Oral, Daily    sulfamethoxazole-trimethoprim (BACTRIM DS) 800-160 mg per tablet 1 tablet, Oral, 2 times daily, smx-tmp DS (BACTRIM) 800-160 mg tabs (1tab q12 D10)    tamsulosin (FLOMAX) 0 8 mg, Oral, Daily with dinner      Past Medical History:   Diagnosis Date    Breast cancer ([facility] )     H/O cervical fracture     Rheumatoid arthritis ([facility] )      Past Surgical History:   Procedure Laterality Date    BLADDER SUSPENSION      BREAST LUMPECTOMY       SECTION      HYSTERECTOMY       History reviewed  No pertinent family history    Social History     Socioeconomic History    Marital status: /Civil Union     Spouse name: None    Number of children: None    Years of education: None    Highest education level: None   Occupational History    None   Social Needs    Financial resource strain: None    Food insecurity     Worry: None     Inability: None    Transportation needs     Medical: None     Non-medical: None   Tobacco Use    Smoking status: Former Smoker     Quit date:      Years since quittin 2    Smokeless tobacco: Never Used   Substance and Sexual Activity    Alcohol use: Yes     Frequency: Monthly or less     Drinks per session: 1 or 2    Drug use: Not Currently    Sexual activity: None   Lifestyle    Physical activity     Days per week: None Minutes per session: None    Stress: None   Relationships    Social connections     Talks on phone: None     Gets together: None     Attends Gnosticist service: None     Active member of club or organization: None     Attends meetings of clubs or organizations: None     Relationship status: None    Intimate partner violence     Fear of current or ex partner: None     Emotionally abused: None     Physically abused: None     Forced sexual activity: None   Other Topics Concern    None   Social History Narrative    None        Review of Systems   Constitutional: Positive for chills and fever  HENT: Negative  Eyes: Negative  Respiratory: Negative  Cardiovascular: Negative  Gastrointestinal: Positive for abdominal pain and nausea  Negative for vomiting  Endocrine: Negative  Genitourinary: Positive for dysuria and flank pain  Negative for difficulty urinating, hematuria and urgency  Skin: Negative  Allergic/Immunologic: Negative  Neurological: Negative  Hematological: Negative  Psychiatric/Behavioral: Negative  Objective     Physical Exam  Constitutional:       General: She is not in acute distress  Appearance: Normal appearance  She is obese  She is not ill-appearing or toxic-appearing  HENT:      Head: Normocephalic and atraumatic  Right Ear: External ear normal       Left Ear: External ear normal       Nose: Nose normal    Eyes:      General: No scleral icterus  Conjunctiva/sclera: Conjunctivae normal    Neck:      Musculoskeletal: Normal range of motion  Cardiovascular:      Rate and Rhythm: Normal rate and regular rhythm  Pulses: Normal pulses  Heart sounds: Normal heart sounds  No murmur  No friction rub  No gallop  Pulmonary:      Effort: Pulmonary effort is normal  No respiratory distress  Breath sounds: Normal breath sounds  No stridor  No wheezing, rhonchi or rales     Abdominal:      General: Bowel sounds are normal  There is no distension  Palpations: Abdomen is soft  Tenderness: There is no abdominal tenderness  There is no right CVA tenderness or left CVA tenderness  Musculoskeletal: Normal range of motion  Neurological:      General: No focal deficit present  Mental Status: She is alert and oriented to person, place, and time  Psychiatric:         Mood and Affect: Mood normal          Behavior: Behavior normal          Thought Content: Thought content normal          Judgment: Judgment normal                 Imaging:  CT ABDOMEN AND PELVIS WITHOUT IV CONTRAST - LOW DOSE RENAL STONE 04/06/2021     INDICATION:   Left flank pain  History of kidney stones      COMPARISON:  None      TECHNIQUE:  Low dose thin section CT examination of the abdomen and pelvis was performed without intravenous or oral contrast according to a protocol specifically designed to evaluate for urinary tract calculus  Axial, sagittal, and coronal 2D   reformatted images were created from the source data and submitted for interpretation  Evaluation for pathology in the abdomen and pelvis that is unrelated to urinary tract calculi is limited       Radiation dose length product (DLP) for this visit:  510 mGy-cm   This examination, like all CT scans performed in the [facility], was performed utilizing techniques to minimize radiation dose exposure, including the use of iterative   reconstruction and automated exposure control       FINDINGS:     RIGHT KIDNEY AND URETER:  Punctate calculus in the lower pole of the kidney  No hydronephrosis or hydroureter  Small cyst in the upper pole      LEFT KIDNEY AND URETER:  6 mm calculus in the proximal left ureter, at the level of the superior endplate of L4, causing mild hydronephrosis with mild perinephric stranding    There is a staghorn calculus in the upper pole of the kidney measuring up to 2 6 cm, and a 3 mm calculus in the lower pole        URINARY BLADDER:   Unremarkable      No significant abnormality in the visualized lung bases      Limited low radiation dose noncontrast CT evaluation demonstrates no clinically significant abnormality of liver, spleen, pancreas, or adrenal glands  No calcified gallstones or gallbladder wall thickening noted  No ascites or bulky lymphadenopathy on this limited noncontrast study  Colonic diverticula are noted, without evidence to suggest acute diverticulitis  Visualized bowel appears otherwise unremarkable  Limited evaluation demonstrates no evidence to suggest acute appendicitis    No acute fracture or destructive osseous lesion is identified         IMPRESSION:     Mildly obstructing 6 mm calculus in the proximal left ureter      Bilateral intrarenal calculi            Labs:  Lab Results   Component Value Date    SODIUM 136 04/08/2021    K 4 0 04/08/2021     04/08/2021    CO2 25 04/08/2021    BUN 9 04/08/2021    CREATININE 0 95 04/08/2021    GLUC 112 04/08/2021    CALCIUM 8 8 04/08/2021         Lab Results   Component Value Date    WBC 10 25 (H) 04/08/2021    HGB 11 2 (L) 04/08/2021    HCT 35 6 04/08/2021    MCV 90 04/08/2021     04/08/2021         VTE Pharmacologic Prophylaxis: Heparin  VTE Mechanical Prophylaxis: sequential compression device     [employer] PA-C

## 2021-04-08 NOTE — ASSESSMENT & PLAN NOTE
Patient diagnosed with left ureteral stone yesterday  Was discharged Bactrim  Had fever today so presented to ER again as per urology advised  Urology was called from ER and they recommended patient be transferred here for evaluation and possible intervention  Will continue with ceftriaxone  Follow up urine cultures  Blood cultures were not sent prior to antibiotics  Will send here  Although low yield at this point  Consult urology for intervention  NPO past midnight  IV fluids

## 2021-04-09 VITALS
RESPIRATION RATE: 20 BRPM | WEIGHT: 198 LBS | DIASTOLIC BLOOD PRESSURE: 72 MMHG | TEMPERATURE: 97.8 F | SYSTOLIC BLOOD PRESSURE: 124 MMHG | HEART RATE: 67 BPM | HEIGHT: 64 IN | OXYGEN SATURATION: 96 % | BODY MASS INDEX: 33.8 KG/M2

## 2021-04-09 LAB
BACTERIA UR CULT: NORMAL
BACTERIA UR CULT: NORMAL

## 2021-04-09 PROCEDURE — 99239 HOSP IP/OBS DSCHRG MGMT >30: CPT | Performed by: INTERNAL MEDICINE

## 2021-04-09 RX ORDER — PHENAZOPYRIDINE HYDROCHLORIDE 100 MG/1
100 TABLET, FILM COATED ORAL
Qty: 10 TABLET | Refills: 0 | Status: SHIPPED | OUTPATIENT
Start: 2021-04-09 | End: 2021-04-10

## 2021-04-09 RX ORDER — OXYBUTYNIN CHLORIDE 5 MG/1
5 TABLET, EXTENDED RELEASE ORAL DAILY
Qty: 30 TABLET | Refills: 0 | Status: SHIPPED | OUTPATIENT
Start: 2021-04-10 | End: 2021-05-19 | Stop reason: HOSPADM

## 2021-04-09 RX ADMIN — MONTELUKAST SODIUM 10 MG: 10 TABLET, FILM COATED ORAL at 08:55

## 2021-04-09 RX ADMIN — ACETAMINOPHEN 650 MG: 325 TABLET, FILM COATED ORAL at 08:55

## 2021-04-09 RX ADMIN — PANTOPRAZOLE SODIUM 40 MG: 40 TABLET, DELAYED RELEASE ORAL at 05:31

## 2021-04-09 RX ADMIN — OXYBUTYNIN 5 MG: 5 TABLET, FILM COATED, EXTENDED RELEASE ORAL at 08:55

## 2021-04-09 RX ADMIN — PHENAZOPYRIDINE 100 MG: 100 TABLET ORAL at 08:55

## 2021-04-09 RX ADMIN — HEPARIN SODIUM 5000 UNITS: 5000 INJECTION INTRAVENOUS; SUBCUTANEOUS at 05:31

## 2021-04-09 RX ADMIN — EZETIMIBE 10 MG: 10 TABLET ORAL at 08:55

## 2021-04-09 NOTE — TELEPHONE ENCOUNTER
Joaquin Ortiz with a 57-year-old female status post stent for obstructing ureteral calculus at [facility]  Patient will require follow-up and scheduling of definitive ureteroscopy  Please contact patient with hospital follow-up appointment date and time

## 2021-04-12 NOTE — UTILIZATION REVIEW
Notification of Discharge  This is a Notification of Discharge from our facility [address]  Please be advised that this patient has been discharge from our facility  Below you will find the admission and discharge date and time including the patients disposition  PRESENTATION DATE: 4/7/2021  8:32 PM  OBS ADMISSION DATE:   IP ADMISSION DATE: 4/7/21 2032   DISCHARGE DATE: 4/9/2021  4:23 PM  DISPOSITION: Home/Self Care Home/Self Care   Admission Orders listed below:  Admission Orders (From admission, onward)     Ordered        04/07/21 2038  Inpatient Admission  Once                   Please contact the UR Department if additional information is required to close this patient's authorization/case  [id number] [employer] Utilization Review Department  Main: [phone] x carefully listen to the prompts  All voicemails are confidential   [email or web address]  Send all requests for admission clinical reviews, approved or denied determinations and any other requests to dedicated fax number below belonging to the campus where the patient is receiving treatment   List of dedicated fax numbers:  [address] DENIALS (Administrative/Medical Necessity) [phone]   [address] (Maternity/NICU/Pediatrics) [phone]   Silver Challenger [phone]   Giorgi Casiano [phone]   [facility] [phone]   [address] [phone]   [facility]  [phone]   [address] [facility] [address] [facility]  [address] [place] [address] [address] [facility] [phone]

## 2021-04-13 LAB
BACTERIA BLD CULT: NORMAL
BACTERIA BLD CULT: NORMAL

## 2021-04-15 ENCOUNTER — CONSULT (OUTPATIENT)
Dept: UROLOGY | Facility: CLINIC | Age: 61
End: 2021-04-15
Payer: COMMERCIAL

## 2021-04-15 VITALS
BODY MASS INDEX: 33.63 KG/M2 | DIASTOLIC BLOOD PRESSURE: 60 MMHG | HEIGHT: 64 IN | SYSTOLIC BLOOD PRESSURE: 100 MMHG | HEART RATE: 75 BPM | WEIGHT: 197 LBS

## 2021-04-15 DIAGNOSIS — N20.0 STAGHORN CALCULUS: Primary | ICD-10-CM

## 2021-04-15 DIAGNOSIS — N20.1 LEFT URETERAL STONE: ICD-10-CM

## 2021-04-15 DIAGNOSIS — N20.1 URETERAL CALCULUS: ICD-10-CM

## 2021-04-15 PROCEDURE — 99214 OFFICE O/P EST MOD 30 MIN: CPT | Performed by: UROLOGY

## 2021-04-15 RX ORDER — CALCIUM CITRATE/VITAMIN D3 200MG-6.25
1 TABLET ORAL DAILY
COMMUNITY

## 2021-04-15 RX ORDER — FLUTICASONE PROPIONATE 50 MCG
1 SPRAY, SUSPENSION (ML) NASAL AS NEEDED
COMMUNITY

## 2021-04-15 RX ORDER — ACETAMINOPHEN 325 MG/1
325 TABLET ORAL AS NEEDED
Status: ON HOLD | COMMUNITY
End: 2021-05-23 | Stop reason: SDUPTHER

## 2021-04-15 NOTE — PROGRESS NOTES
[address] [facility]  [facility]  [address] [facility]  [place], [address] [place] [address]  [phone]  www  [facility]  org      NAME: Mary Anne Torres  AGE: 63 y o  SEX: female  : 1960   MRN: [id number]    DATE: 4/15/2021  TIME: 11:20 AM    Assessment and Plan:    6 mm proximal left ureteral stone stented with recent urinary tract infection  She also has a partial staghorn calculus of the left upper pole  We discussed ureteroscopy and laser lithotripsy and PCN L  My recommendation is ureteroscopy and laser lithotripsy but she may require an additional procedure due to the volume of the stone in the upper pole  We will do stent exchange at that time  Risks of bleeding infection damage to the urinary tract and need for additional procedures were explained issues informed consent  Chief Complaint   No chief complaint on file  History of Present Illness   Follow-up left ureteral stenting for infected 6 mm left proximal ureteral calculus and left renal calculi on 2021 by Dr Celso Lambert  Urine culture negative 2021  She has had stones before that she has passed but she has never required surgery  She currently is tolerating the stent quite well  She is normally on Imuran for rheumatoid arthritis  We reviewed her films together and shows a partial staghorn calculus of the left upper pole and a 6 mm stone in the proximal left ureter  No other stones  Normal right kidney  Some hydronephrosis due to ureteral stone      The following portions of the patient's history were reviewed and updated as appropriate: allergies, current medications, past family history, past medical history, past social history, past surgical history and problem list   Past Medical History:   Diagnosis Date    Breast cancer ([address] )     H/O cervical fracture     Rheumatoid arthritis ([facility] )      Past Surgical History:   Procedure Laterality Date    BLADDER SUSPENSION  BREAST LUMPECTOMY       SECTION      FL RETROGRADE PYELOGRAM  2021    HYSTERECTOMY      OR CYSTOURETHROSCOPY,URETER CATHETER Left 2021    Procedure: CYSTOSCOPY RETROGRADE PYELOGRAM WITH INSERTION STENT URETERAL;  Surgeon: Mary Montilla MD;  [facility]: [facility];  Service: Urology     shoulder  Review of Systems   Review of Systems   Constitutional: Negative for fever  Cardiovascular: Negative for chest pain  Genitourinary: Negative  Active Problem List     Patient Active Problem List   Diagnosis    Obstructing left ureteral stone with mild hydronephrosis and pyelonephritis    Hydronephrosis of left kidney    Pyelonephritis of left kidney    Rheumatoid arthritis ([address] )    Sepsis ([address] )    Acute kidney injury ([address] )    Gastroesophageal reflux disease without esophagitis    Malignant neoplasm of female breast (HCC)    Hyperlipidemia    Obesity (BMI 30 0-34 9)       Objective   /60   Pulse 75   Ht 5' 4" (1 626 m)   Wt 89 4 kg (197 lb)   BMI 33 81 kg/m²     Physical Exam  Vitals signs reviewed  Constitutional:       Appearance: Normal appearance  HENT:      Head: Normocephalic and atraumatic  Eyes:      Extraocular Movements: Extraocular movements intact  Neck:      Musculoskeletal: Normal range of motion  Cardiovascular:      Rate and Rhythm: Normal rate and regular rhythm  Heart sounds: Normal heart sounds  No murmur  No friction rub  No gallop  Pulmonary:      Effort: Pulmonary effort is normal  No respiratory distress  Breath sounds: Normal breath sounds  No stridor  No wheezing  Musculoskeletal: Normal range of motion  Skin:     Coloration: Skin is not jaundiced or pale  Neurological:      General: No focal deficit present  Mental Status: She is alert and oriented to person, place, and time  Psychiatric:         Mood and Affect: Mood normal          Behavior: Behavior normal          Thought Content:  Thought content normal          Judgment: Judgment normal              Current Medications     Current Outpatient Medications:     acetaminophen (Tylenol) 325 mg tablet, Take by mouth, Disp: , Rfl:     azaTHIOprine (IMURAN) 50 mg tablet, Take 50 mg by mouth daily, Disp: , Rfl:     Cholecalciferol 50 MCG (2000 UT) CAPS, Take 1 capsule by mouth, Disp: , Rfl:     cyanocobalamin (VITAMIN B-12) 1000 MCG tablet, Take by mouth, Disp: , Rfl:     Diclofenac Sodium (VOLTAREN) 1 %, diclofenac 1 % topical gel  APPLY 2 GRAMS TO THE AFFECTED AREA(S) BY TOPICAL ROUTE 4 TIMES PER DAY, Disp: , Rfl:     ezetimibe (ZETIA) 10 mg tablet, Take 10 mg by mouth daily, Disp: , Rfl:     fluticasone (FLONASE) 50 mcg/act nasal spray, 1 spray into each nostril, Disp: , Rfl:     Magnesium 100 MG CAPS, Take by mouth, Disp: , Rfl:     montelukast (SINGULAIR) 10 mg tablet, Take 10 mg by mouth daily, Disp: , Rfl:     Multiple Vitamins-Minerals (Multivitamin Gummies Womens) [place] ONEAL, Disp: , Rfl:     oxybutynin (DITROPAN-XL) 5 mg 24 hr tablet, Take 1 tablet (5 mg total) by mouth daily, Disp: 30 tablet, Rfl: 0    pantoprazole (PROTONIX) 40 mg tablet, Take 40 mg by mouth daily, Disp: , Rfl:     tamsulosin (FLOMAX) 0 4 mg, Take 2 capsules (0 8 mg total) by mouth daily with dinner for 7 days, Disp: 14 capsule, Rfl: 0    oxyCODONE-acetaminophen (PERCOCET) 5-325 mg per tablet, Take 1 tablet by mouth every 8 (eight) hours as needed for moderate pain for up to 10 daysMax Daily Amount: 3 tablets (Patient not taking: Reported on 4/15/2021), Disp: 12 tablet, Rfl: 0        Audelia Ma MD

## 2021-04-15 NOTE — H&P
[address] [facility]  [facility]  [address] [facility]  [place], [address] [place] [address]  [phone]  www  [facility]  org      NAME: Michael Oropeza  AGE: 63 y o  SEX: female  : 1960   MRN: [id number]    DATE: 4/15/2021  TIME: 11:20 AM    Assessment and Plan:    6 mm proximal left ureteral stone stented with recent urinary tract infection  She also has a partial staghorn calculus of the left upper pole  We discussed ureteroscopy and laser lithotripsy and PCN L  My recommendation is ureteroscopy and laser lithotripsy but she may require an additional procedure due to the volume of the stone in the upper pole  We will do stent exchange at that time  Risks of bleeding infection damage to the urinary tract and need for additional procedures were explained issues informed consent  Chief Complaint   No chief complaint on file  History of Present Illness   Follow-up left ureteral stenting for infected 6 mm left proximal ureteral calculus and left renal calculi on 2021 by Dr Jose Antonio Day  Urine culture negative 2021  She has had stones before that she has passed but she has never required surgery  She currently is tolerating the stent quite well  She is normally on Imuran for rheumatoid arthritis  We reviewed her films together and shows a partial staghorn calculus of the left upper pole and a 6 mm stone in the proximal left ureter  No other stones  Normal right kidney  Some hydronephrosis due to ureteral stone      The following portions of the patient's history were reviewed and updated as appropriate: allergies, current medications, past family history, past medical history, past social history, past surgical history and problem list   Past Medical History:   Diagnosis Date    Breast cancer ([address] )     H/O cervical fracture     Rheumatoid arthritis ([facility] [address] )      Past Surgical History:   Procedure Laterality Date    BLADDER SUSPENSION  BREAST LUMPECTOMY       SECTION      FL RETROGRADE PYELOGRAM  2021    HYSTERECTOMY      MN CYSTOURETHROSCOPY,URETER CATHETER Left 2021    Procedure: CYSTOSCOPY RETROGRADE PYELOGRAM WITH INSERTION STENT URETERAL;  Surgeon: Jannet Escobar MD;  [facility];  Service: Urology     shoulder  Review of Systems   Review of Systems   Constitutional: Negative for fever  Cardiovascular: Negative for chest pain  Genitourinary: Negative  Active Problem List     Patient Active Problem List   Diagnosis    Obstructing left ureteral stone with mild hydronephrosis and pyelonephritis    Hydronephrosis of left kidney    Pyelonephritis of left kidney    Rheumatoid arthritis ([address] )    Sepsis ([address] )    Acute kidney injury ([address] )    Gastroesophageal reflux disease without esophagitis    Malignant neoplasm of female breast (HCC)    Hyperlipidemia    Obesity (BMI 30 0-34 9)       Objective   /60   Pulse 75   Ht 5' 4" (1 626 m)   Wt 89 4 kg (197 lb)   BMI 33 81 kg/m²     Physical Exam  Vitals signs reviewed  Constitutional:       Appearance: Normal appearance  HENT:      Head: Normocephalic and atraumatic  Eyes:      Extraocular Movements: Extraocular movements intact  Neck:      Musculoskeletal: Normal range of motion  Cardiovascular:      Rate and Rhythm: Normal rate and regular rhythm  Heart sounds: Normal heart sounds  No murmur  No friction rub  No gallop  Pulmonary:      Effort: Pulmonary effort is normal  No respiratory distress  Breath sounds: Normal breath sounds  No stridor  No wheezing  Musculoskeletal: Normal range of motion  Skin:     Coloration: Skin is not jaundiced or pale  Neurological:      General: No focal deficit present  Mental Status: She is alert and oriented to person, place, and time  Psychiatric:         Mood and Affect: Mood normal          Behavior: Behavior normal          Thought Content:  Thought content normal          Judgment: Judgment normal              Current Medications     Current Outpatient Medications:     acetaminophen (Tylenol) 325 mg tablet, Take by mouth, Disp: , Rfl:     azaTHIOprine (IMURAN) 50 mg tablet, Take 50 mg by mouth daily, Disp: , Rfl:     Cholecalciferol 50 MCG (2000 UT) CAPS, Take 1 capsule by mouth, Disp: , Rfl:     cyanocobalamin (VITAMIN B-12) 1000 MCG tablet, Take by mouth, Disp: , Rfl:     Diclofenac Sodium (VOLTAREN) 1 %, diclofenac 1 % topical gel  APPLY 2 GRAMS TO THE AFFECTED AREA(S) BY TOPICAL ROUTE 4 TIMES PER DAY, Disp: , Rfl:     ezetimibe (ZETIA) 10 mg tablet, Take 10 mg by mouth daily, Disp: , Rfl:     fluticasone (FLONASE) 50 mcg/act nasal spray, 1 spray into each nostril, Disp: , Rfl:     Magnesium 100 MG CAPS, Take by mouth, Disp: , Rfl:     montelukast (SINGULAIR) 10 mg tablet, Take 10 mg by mouth daily, Disp: , Rfl:     Multiple Vitamins-Minerals (Multivitamin Gummies Womens) CHEW, [address] [place] [address], Disp: , Rfl:     oxybutynin (DITROPAN-XL) 5 mg 24 hr tablet, Take 1 tablet (5 mg total) by mouth daily, Disp: 30 tablet, Rfl: 0    pantoprazole (PROTONIX) 40 mg tablet, Take 40 mg by mouth daily, Disp: , Rfl:     tamsulosin (FLOMAX) 0 4 mg, Take 2 capsules (0 8 mg total) by mouth daily with dinner for 7 days, Disp: 14 capsule, Rfl: 0    oxyCODONE-acetaminophen (PERCOCET) 5-325 mg per tablet, Take 1 tablet by mouth every 8 (eight) hours as needed for moderate pain for up to 10 daysMax Daily Amount: 3 tablets (Patient not taking: Reported on 4/15/2021), Disp: 12 tablet, Rfl: 0        Tio Harkins MD

## 2021-04-19 ENCOUNTER — NURSE TRIAGE (OUTPATIENT)
Dept: OTHER | Facility: OTHER | Age: 61
End: 2021-04-19

## 2021-04-19 NOTE — TELEPHONE ENCOUNTER
Regarding: surgery 4/8/21 had stent put in and i keep going to the bathroom continuously   ----- Message from Esau Crouch sent at 4/19/2021  5:13 PM EDT -----  " I had surgery on 4/8/21 which I had a stent put in and now I am going to the bathroom continuously the only time I don't go is if I do not move at all but once I get up I go and I have a little pressure down there too "   Paged on call provider to advise because pt tries to urinate on toliet and barely anything comes out but when she stands urine poors out  Accompanied by some pressure in the left bladder  On call provider said pt should call office and make appointment for tomorrow or go to ER for tonight if she can not wait  pt made aware and will call office in am

## 2021-04-19 NOTE — DISCHARGE SUMMARY
[facility]  Discharge- Daylin Brown 1960, 63 y o  female MRN: [id number]  [facility]: [id number]  Primary Care Provider: Samson Young MD   Date and time admitted to hospital: 4/7/2021  8:32 PM    Acute kidney injury [facility])  Assessment & Plan  Creatinine increased to 1 3  4/07  Suspect due to ATN from sepsis/urinary obstruction  Will continue with IV fluids for now  Creatinine 0 95 04/08  Normal renal function  Resolved  Sepsis ([address] )  Assessment & Plan  POA  Fever, tachycardia, leukocytosis, JIN, left pyelonephritis with source of infection  Patient immunocompromised as she is on Imuran  Urine cultures positive   Blood cultures pending   Antibiotics as above  Afebrile today  Lactic acid 0 9 normal range   Pt is asymptomatic  Sepsis resolved     Rheumatoid arthritis [facility])  Assessment & Plan  Patient on Imuran  * Obstructing left ureteral stone with mild hydronephrosis and pyelonephritis  Assessment & Plan  Patient diagnosed with left ureteral stone yesterday  Was discharged Bactrim  Had fever today so presented to ER again as per urology advised  Urology was called from ER and they recommended patient be transferred here for evaluation and possible intervention  Bactrim switched to Ceftriaxone  Blood cultures negative  Urine cultures show mixed contaminate  Second cultures drawn 04/08- pending results  IV fluids  CYSTOSCOPY RETROGRADE PYELOGRAM WITH INSERTION  LEFT J STENT URETERAL without string, completed 04/08  24 hour observation overnight, pt doing well without complications    Follow up with urology outpatient             Discharging Physician / Practitioner: Jorge Limon MD  PCP: Samson Young MD  Admission Date:   Admission Orders (From admission, onward)     Ordered        04/07/21 2038  Inpatient Admission  Once                   Discharge Date: 04/09/2021    Resolved Problems  Date Reviewed: 4/19/2021 None          Consultations During Hospital Stay:  · Urology     Procedures Performed:   ·  CT abdomen and pelvis showed perinephric stranding  · Cytstoscopy with retrograde pyelogram with stent placement  · Blood culture no growth   · WBC of 10 25 with elevation of neutrophils    Significant Findings / Test Results:   · As above     Incidental Findings:   ·      Test Results Pending at Discharge (will require follow up): · None      Outpatient Tests Requested:  · None     Complications:  None     Reason for Admission: patient has fever    Hospital Course:     Rolando Umanzor is a 63 y o  female patient who originally presented to the hospital on 4/7/2021 due to fever after left ureteral stone  She was discharged with bactrim and she developed fever  She was transferred to [facility] for urology evaluation  She under went cystoscopy with retrograde pyelogram with stent placement  She was monitored overnight after procedure for signs of infection, bleeding per urology  She has neg blood cultures or urine culture were negative  Negin renal function, no signs of pain  She was discharged to follow up with urology  Please see above list of diagnoses and related plan for additional information  Condition at Discharge: stable     Discharge Day Visit / Exam:     * Please refer to separate progress note for these details *    Discussion with Family:  updated     Discharge instructions/Information to patient and family:   See after visit summary for information provided to patient and family  Provisions for Follow-Up Care:  See after visit summary for information related to follow-up care and any pertinent home health orders  Disposition:     Home    For Discharges to [facility] SNF:   · Not Applicable to this Patient - Not Applicable to this Patient    Planned Readmission: no      Discharge Statement:  I spent 45 minutes discharging the patient   This time was spent on the day of discharge  I had direct contact with the patient on the day of discharge  Greater than 50% of the total time was spent examining patient, answering all patient questions, arranging and discussing plan of care with patient as well as directly providing post-discharge instructions  Additional time then spent on discharge activities  Discharge Medications:  See after visit summary for reconciled discharge medications provided to patient and family        ** Please Note: This note has been constructed using a voice recognition system **

## 2021-04-19 NOTE — TELEPHONE ENCOUNTER
Reason for Disposition   [1] Caller has URGENT question AND [2] triager unable to answer question    Answer Assessment - Initial Assessment Questions  1  SYMPTOM: "What's the main symptom you're concerned about?" (e g , pain, fever, vomiting)      Urgency and frequency after having stent place, as soon as she stands up she starts urinating with no control   2  ONSET: "When did incontinence after stent placed on 4/8/21  start?"      This afternoon  3  SURGERY: "What surgery was performed?"      4/8/21 stent placed in left ureter   4  DATE of SURGERY: "When was surgery performed?"       4/8/21  5  ANESTHESIA: " What type of anesthesia did you have?" (e g , general, spinal, epidural, local)      general  6  PAIN: "Is there any pain?" If so, ask: "How bad is it?"  (Scale 1-10; or mild, moderate, severe)      Discomfort/pressure feels full  7  FEVER: "Do you have a fever?" If so, ask: "What is your temperature, how was it measured, and when did it start?"      no  8  VOMITING: "Is there any vomiting?" If yes, ask: "How many times?"      no  9  BLEEDING: "Is there any bleeding?" If so, ask: "How much?" and "Where?"      no  10   OTHER SYMPTOMS: "Do you have any other symptoms?" (e g , drainage from wound, painful urination, constipation)        no    Protocols used: POST-OP SYMPTOMS AND QUESTIONS-[facility]

## 2021-04-20 ENCOUNTER — HOSPITAL ENCOUNTER (OUTPATIENT)
Dept: RADIOLOGY | Facility: HOSPITAL | Age: 61
Discharge: HOME | End: 2021-04-20
Attending: UROLOGY
Payer: COMMERCIAL

## 2021-04-20 ENCOUNTER — TELEPHONE (OUTPATIENT)
Dept: UROLOGY | Facility: AMBULATORY SURGERY CENTER | Age: 61
End: 2021-04-20

## 2021-04-20 DIAGNOSIS — N20.1 URETERAL CALCULUS: ICD-10-CM

## 2021-04-20 DIAGNOSIS — Z96.0 RETAINED URETERAL STENT: ICD-10-CM

## 2021-04-20 DIAGNOSIS — N20.1 URETERAL CALCULUS: Primary | ICD-10-CM

## 2021-04-20 PROCEDURE — 74018 RADEX ABDOMEN 1 VIEW: CPT

## 2021-04-20 NOTE — TELEPHONE ENCOUNTER
Call placed to patient and spoke with her  Informed her of the recommendations of Dr Mary Figueroa  She is aware and will be going to [address] right now for XRAY  Will check back for results later once testing completed

## 2021-04-20 NOTE — TELEPHONE ENCOUNTER
Patient calling in regards to stent  Patient states she does see stent and before getting kub would like to speak with nurse

## 2021-04-20 NOTE — TELEPHONE ENCOUNTER
Patient managed by Dr Daya Albert [facility] patient called in to inform her stent may have moved because she is urinating a lot patient stated she went through 4 pads and feeling some discomfort   Patient can be reached at [phone]

## 2021-04-20 NOTE — TELEPHONE ENCOUNTER
LMOM for pt to call back  Per Dr Rohan Donovan, if she actually sees the stent, she can pull it out herself  He would like her to have a KUB to check on the stones  If she develops severe pain or fever, she needs to go to the ER

## 2021-04-20 NOTE — TELEPHONE ENCOUNTER
Call placed to patient and spoke with her  She informed me that since yesterday she cannot hold her urine at all  She is soaking thorough 4-5 pads and depends  She is also  Having some bladder pressure and feels as though the stent may have migrated because she was not feeling like this a few weeks ago when the stent was placed  Pt cannot see the stent externally  Pt is asking what she can do for her symptoms or if there is a way we can check to see if the stent migrated  Informed patient that I will contact the provider for further review and recommendations  ER precautions were reviewed with the patient in the event her symptoms worsen  She verbalized her understanding

## 2021-04-26 ENCOUNTER — TELEPHONE (OUTPATIENT)
Dept: UROLOGY | Facility: MEDICAL CENTER | Age: 61
End: 2021-04-26

## 2021-04-26 NOTE — TELEPHONE ENCOUNTER
Patient had surgery 4/8/21 with Dr Odilon Cotton   Patients employer has not received disability paper work yet and patient was calling to follow up   [phone]

## 2021-04-26 NOTE — TELEPHONE ENCOUNTER
----- Message from Mili Burden MD sent at 4/25/2021  6:59 PM EDT -----  Please let her know that the x-ray shows the stone in the left kidney, and there is no stent in place  Otherwise unremarkable  We will see her for surgery

## 2021-05-05 ENCOUNTER — APPOINTMENT (OUTPATIENT)
Dept: LAB | Facility: HOSPITAL | Age: 61
End: 2021-05-05
Attending: UROLOGY
Payer: COMMERCIAL

## 2021-05-05 DIAGNOSIS — N20.0 STAGHORN CALCULUS: ICD-10-CM

## 2021-05-05 PROCEDURE — 87186 SC STD MICRODIL/AGAR DIL: CPT

## 2021-05-05 PROCEDURE — 87086 URINE CULTURE/COLONY COUNT: CPT

## 2021-05-05 PROCEDURE — 87077 CULTURE AEROBIC IDENTIFY: CPT

## 2021-05-07 ENCOUNTER — TELEPHONE (OUTPATIENT)
Dept: UROLOGY | Facility: MEDICAL CENTER | Age: 61
End: 2021-05-07

## 2021-05-07 ENCOUNTER — NURSE TRIAGE (OUTPATIENT)
Dept: OTHER | Facility: OTHER | Age: 61
End: 2021-05-07

## 2021-05-07 DIAGNOSIS — N30.00 ACUTE CYSTITIS WITHOUT HEMATURIA: Primary | ICD-10-CM

## 2021-05-07 DIAGNOSIS — N30.00 ACUTE CYSTITIS WITHOUT HEMATURIA: ICD-10-CM

## 2021-05-07 LAB — BACTERIA UR CULT: ABNORMAL

## 2021-05-07 RX ORDER — SULFAMETHOXAZOLE AND TRIMETHOPRIM 800; 160 MG/1; MG/1
1 TABLET ORAL 2 TIMES DAILY
Qty: 6 TABLET | Refills: 0 | Status: SHIPPED | OUTPATIENT
Start: 2021-05-07 | End: 2021-05-10

## 2021-05-07 RX ORDER — SULFAMETHOXAZOLE AND TRIMETHOPRIM 800; 160 MG/1; MG/1
1 TABLET ORAL 2 TIMES DAILY
Qty: 6 TABLET | Refills: 0 | Status: SHIPPED | OUTPATIENT
Start: 2021-05-07 | End: 2021-05-07 | Stop reason: SDUPTHER

## 2021-05-07 NOTE — TELEPHONE ENCOUNTER
Reason for Disposition   [1] Prescription not at pharmacy AND [2] was prescribed by PCP recently    Answer Assessment - Initial Assessment Questions  1  NAME of MEDICATION: "What medicine are you calling about?"      Bactrim  2  QUESTION: "What is your question?"      Not at the [facility] [place]  3  PRESCRIBING HCP: "Who prescribed it?" Reason: if prescribed by specialist, call should be referred to that group        Dr Radha Velasquez with urology    Protocols used: MEDICATION QUESTION CALL-ADULT-

## 2021-05-07 NOTE — TELEPHONE ENCOUNTER
Bactrim script printed, sent electronically for patient again as they did not receive the previous order

## 2021-05-07 NOTE — TELEPHONE ENCOUNTER
Call placed to patient and made her aware that Dr Beckett has sent Bactrim to her pharmacy due to her urine culture results coming back positive for UTI      ----- Message from Dia Walters MD sent at 5/7/2021 10:35 AM EDT -----  Please let her know that I sent Bactrim to her pharmacy for a UTI - this is the correct medication

## 2021-05-13 RX ORDER — MULTIVIT-MIN/IRON/FOLIC ACID/K 18-600-40
CAPSULE ORAL
COMMUNITY

## 2021-05-13 RX ORDER — LANOLIN ALCOHOL/MO/W.PET/CERES
CREAM (GRAM) TOPICAL DAILY
COMMUNITY
End: 2021-05-23 | Stop reason: HOSPADM

## 2021-05-13 RX ORDER — LEFLUNOMIDE 20 MG/1
20 TABLET ORAL DAILY
COMMUNITY

## 2021-05-13 NOTE — PRE-PROCEDURE INSTRUCTIONS
Pre-Surgery Instructions:   Medication Instructions    acetaminophen (Tylenol) 325 mg tablet Instructed patient per Anesthesia Guidelines   Ascorbic Acid (Vitamin C) 500 MG CAPS Instructed patient per Anesthesia Guidelines   Cholecalciferol 50 MCG (2000 UT) CAPS Instructed patient per Anesthesia Guidelines   cyanocobalamin (VITAMIN B-12) 1000 MCG tablet Instructed patient per Anesthesia Guidelines   Diclofenac Sodium (VOLTAREN) 1 % Instructed patient per Anesthesia Guidelines   ezetimibe (ZETIA) 10 mg tablet Instructed patient per Anesthesia Guidelines   fluticasone (FLONASE) 50 mcg/act nasal spray Instructed patient per Anesthesia Guidelines   leflunomide (ARAVA) 20 MG tablet Patient was instructed to contact Physician for medication instruction   Magnesium 100 MG CAPS Instructed patient per Anesthesia Guidelines   montelukast (SINGULAIR) 10 mg tablet Instructed patient per Anesthesia Guidelines   Multiple Vitamins-Minerals (Multivitamin Gummies Womens) CHEW Instructed patient per Anesthesia Guidelines   pantoprazole (PROTONIX) 40 mg tablet Instructed patient per Anesthesia Guidelines   tamsulosin (FLOMAX) 0 4 mg Instructed patient per Anesthesia Guidelines  Pt was instructed to stop vitamins and supplements from 5/13/21 until after surgery  Pt was instructed to contact MD regarding when to stop ARAVA before surgery  Pt verb understanding  Pt will take protonix the am of surgery with a small sip of water

## 2021-05-19 ENCOUNTER — HOSPITAL ENCOUNTER (OUTPATIENT)
Facility: HOSPITAL | Age: 61
Setting detail: OUTPATIENT SURGERY
Discharge: HOME | End: 2021-05-19
Attending: UROLOGY | Admitting: UROLOGY
Payer: COMMERCIAL

## 2021-05-19 ENCOUNTER — ANESTHESIA (OUTPATIENT)
Dept: PERIOP | Facility: HOSPITAL | Age: 61
End: 2021-05-19
Payer: COMMERCIAL

## 2021-05-19 ENCOUNTER — APPOINTMENT (OUTPATIENT)
Dept: RADIOLOGY | Facility: HOSPITAL | Age: 61
End: 2021-05-19
Payer: COMMERCIAL

## 2021-05-19 ENCOUNTER — ANESTHESIA EVENT (OUTPATIENT)
Dept: PERIOP | Facility: HOSPITAL | Age: 61
End: 2021-05-19
Payer: COMMERCIAL

## 2021-05-19 VITALS
TEMPERATURE: 98.2 F | OXYGEN SATURATION: 99 % | RESPIRATION RATE: 18 BRPM | DIASTOLIC BLOOD PRESSURE: 74 MMHG | SYSTOLIC BLOOD PRESSURE: 136 MMHG | HEIGHT: 64 IN | BODY MASS INDEX: 33.29 KG/M2 | WEIGHT: 195 LBS | HEART RATE: 88 BPM

## 2021-05-19 DIAGNOSIS — N20.0 STAGHORN CALCULUS: Primary | ICD-10-CM

## 2021-05-19 DIAGNOSIS — N20.1 URETERAL CALCULUS: ICD-10-CM

## 2021-05-19 PROCEDURE — C2617 STENT, NON-COR, TEM W/O DEL: HCPCS | Performed by: UROLOGY

## 2021-05-19 PROCEDURE — C1758 CATHETER, URETERAL: HCPCS | Performed by: UROLOGY

## 2021-05-19 PROCEDURE — 76000 FLUOROSCOPY <1 HR PHYS/QHP: CPT

## 2021-05-19 PROCEDURE — NC001 PR NO CHARGE: Performed by: UROLOGY

## 2021-05-19 PROCEDURE — 52356 CYSTO/URETERO W/LITHOTRIPSY: CPT | Performed by: UROLOGY

## 2021-05-19 PROCEDURE — C1769 GUIDE WIRE: HCPCS | Performed by: UROLOGY

## 2021-05-19 PROCEDURE — C1894 INTRO/SHEATH, NON-LASER: HCPCS | Performed by: UROLOGY

## 2021-05-19 DEVICE — INLAY OPTIMA URETERAL STENT W/O GUIDEWIRE
Type: IMPLANTABLE DEVICE | Site: KIDNEY | Status: FUNCTIONAL
Brand: BARD® INLAY OPTIMA® URETERAL STENT

## 2021-05-19 RX ORDER — PROPOFOL 10 MG/ML
INJECTION, EMULSION INTRAVENOUS AS NEEDED
Status: DISCONTINUED | OUTPATIENT
Start: 2021-05-19 | End: 2021-05-19

## 2021-05-19 RX ORDER — CEFTRIAXONE 1 G/50ML
1000 INJECTION, SOLUTION INTRAVENOUS EVERY 24 HOURS
Status: DISCONTINUED | OUTPATIENT
Start: 2021-05-19 | End: 2021-05-19 | Stop reason: HOSPADM

## 2021-05-19 RX ORDER — SULFAMETHOXAZOLE AND TRIMETHOPRIM 800; 160 MG/1; MG/1
1 TABLET ORAL EVERY 12 HOURS SCHEDULED
Qty: 14 TABLET | Refills: 0 | Status: SHIPPED | OUTPATIENT
Start: 2021-05-19 | End: 2021-05-23 | Stop reason: HOSPADM

## 2021-05-19 RX ORDER — MAGNESIUM HYDROXIDE 1200 MG/15ML
LIQUID ORAL AS NEEDED
Status: DISCONTINUED | OUTPATIENT
Start: 2021-05-19 | End: 2021-05-19 | Stop reason: HOSPADM

## 2021-05-19 RX ORDER — MIDAZOLAM HYDROCHLORIDE 2 MG/2ML
INJECTION, SOLUTION INTRAMUSCULAR; INTRAVENOUS AS NEEDED
Status: DISCONTINUED | OUTPATIENT
Start: 2021-05-19 | End: 2021-05-19

## 2021-05-19 RX ORDER — HYDROCODONE BITARTRATE AND ACETAMINOPHEN 5; 325 MG/1; MG/1
1-2 TABLET ORAL EVERY 6 HOURS PRN
Qty: 5 TABLET | Refills: 0 | Status: SHIPPED | OUTPATIENT
Start: 2021-05-19

## 2021-05-19 RX ORDER — ONDANSETRON 2 MG/ML
4 INJECTION INTRAMUSCULAR; INTRAVENOUS ONCE AS NEEDED
Status: DISCONTINUED | OUTPATIENT
Start: 2021-05-19 | End: 2021-05-19 | Stop reason: HOSPADM

## 2021-05-19 RX ORDER — ONDANSETRON 2 MG/ML
INJECTION INTRAMUSCULAR; INTRAVENOUS AS NEEDED
Status: DISCONTINUED | OUTPATIENT
Start: 2021-05-19 | End: 2021-05-19

## 2021-05-19 RX ORDER — DEXAMETHASONE SODIUM PHOSPHATE 10 MG/ML
INJECTION, SOLUTION INTRAMUSCULAR; INTRAVENOUS AS NEEDED
Status: DISCONTINUED | OUTPATIENT
Start: 2021-05-19 | End: 2021-05-19

## 2021-05-19 RX ORDER — HYDROCODONE BITARTRATE AND ACETAMINOPHEN 5; 325 MG/1; MG/1
1 TABLET ORAL EVERY 6 HOURS PRN
Status: DISCONTINUED | OUTPATIENT
Start: 2021-05-19 | End: 2021-05-19 | Stop reason: HOSPADM

## 2021-05-19 RX ORDER — PHENAZOPYRIDINE HYDROCHLORIDE 100 MG/1
200 TABLET, FILM COATED ORAL ONCE
Status: COMPLETED | OUTPATIENT
Start: 2021-05-19 | End: 2021-05-19

## 2021-05-19 RX ORDER — SODIUM CHLORIDE, SODIUM LACTATE, POTASSIUM CHLORIDE, CALCIUM CHLORIDE 600; 310; 30; 20 MG/100ML; MG/100ML; MG/100ML; MG/100ML
INJECTION, SOLUTION INTRAVENOUS CONTINUOUS PRN
Status: DISCONTINUED | OUTPATIENT
Start: 2021-05-19 | End: 2021-05-19

## 2021-05-19 RX ORDER — FENTANYL CITRATE 50 UG/ML
INJECTION, SOLUTION INTRAMUSCULAR; INTRAVENOUS AS NEEDED
Status: DISCONTINUED | OUTPATIENT
Start: 2021-05-19 | End: 2021-05-19

## 2021-05-19 RX ORDER — OXYBUTYNIN CHLORIDE 5 MG/1
5 TABLET, EXTENDED RELEASE ORAL DAILY
Status: DISCONTINUED | OUTPATIENT
Start: 2021-05-20 | End: 2021-05-19 | Stop reason: HOSPADM

## 2021-05-19 RX ORDER — FENTANYL CITRATE/PF 50 MCG/ML
25 SYRINGE (ML) INJECTION
Status: DISCONTINUED | OUTPATIENT
Start: 2021-05-19 | End: 2021-05-19 | Stop reason: HOSPADM

## 2021-05-19 RX ORDER — OXYBUTYNIN CHLORIDE 5 MG/1
5 TABLET ORAL 3 TIMES DAILY PRN
Qty: 20 TABLET | Refills: 0 | Status: SHIPPED | OUTPATIENT
Start: 2021-05-19

## 2021-05-19 RX ORDER — LIDOCAINE HYDROCHLORIDE 10 MG/ML
INJECTION, SOLUTION EPIDURAL; INFILTRATION; INTRACAUDAL; PERINEURAL AS NEEDED
Status: DISCONTINUED | OUTPATIENT
Start: 2021-05-19 | End: 2021-05-19

## 2021-05-19 RX ORDER — ALBUTEROL SULFATE 2.5 MG/3ML
2.5 SOLUTION RESPIRATORY (INHALATION) ONCE AS NEEDED
Status: DISCONTINUED | OUTPATIENT
Start: 2021-05-19 | End: 2021-05-19 | Stop reason: HOSPADM

## 2021-05-19 RX ORDER — PHENAZOPYRIDINE HYDROCHLORIDE 200 MG/1
200 TABLET, FILM COATED ORAL EVERY 8 HOURS PRN
Qty: 10 TABLET | Refills: 0 | Status: SHIPPED | OUTPATIENT
Start: 2021-05-19 | End: 2021-05-23 | Stop reason: HOSPADM

## 2021-05-19 RX ADMIN — PHENYLEPHRINE HYDROCHLORIDE 100 MCG: 10 INJECTION INTRAVENOUS at 15:55

## 2021-05-19 RX ADMIN — SODIUM CHLORIDE, SODIUM LACTATE, POTASSIUM CHLORIDE, AND CALCIUM CHLORIDE: .6; .31; .03; .02 INJECTION, SOLUTION INTRAVENOUS at 15:37

## 2021-05-19 RX ADMIN — PROPOFOL 150 MG: 10 INJECTION, EMULSION INTRAVENOUS at 15:40

## 2021-05-19 RX ADMIN — ONDANSETRON 4 MG: 2 INJECTION INTRAMUSCULAR; INTRAVENOUS at 15:42

## 2021-05-19 RX ADMIN — FENTANYL CITRATE 25 MCG: 50 INJECTION, SOLUTION INTRAMUSCULAR; INTRAVENOUS at 15:47

## 2021-05-19 RX ADMIN — PHENAZOPYRIDINE HYDROCHLORIDE 200 MG: 100 TABLET ORAL at 17:04

## 2021-05-19 RX ADMIN — LIDOCAINE HYDROCHLORIDE 50 MG: 10 INJECTION, SOLUTION EPIDURAL; INFILTRATION; INTRACAUDAL; PERINEURAL at 15:40

## 2021-05-19 RX ADMIN — MIDAZOLAM HYDROCHLORIDE 2 MG: 1 INJECTION, SOLUTION INTRAMUSCULAR; INTRAVENOUS at 15:35

## 2021-05-19 RX ADMIN — DEXAMETHASONE SODIUM PHOSPHATE 4 MG: 10 INJECTION, SOLUTION INTRAMUSCULAR; INTRAVENOUS at 15:42

## 2021-05-19 RX ADMIN — CEFTRIAXONE 1000 MG: 1 INJECTION, SOLUTION INTRAVENOUS at 15:38

## 2021-05-19 NOTE — H&P
Vinny Corbett MD   Physician   Specialty:  Urology   Progress Notes       Signed   Encounter Date:  4/15/2021            H/P updated 2021    Expand All Collapse All      [address] [facility]  [address] [place] Sanders, [address] [place] [address]  [phone]  www  [facility]  org        NAME: Romayne Cecil  AGE: 63 y o  SEX: female  : 1960              MRN: [id number]     DATE: 4/15/2021  TIME: 11:20 AM     Assessment and Plan:     6 mm proximal left ureteral stone stented with recent urinary tract infection  She also has a partial staghorn calculus of the left upper pole  We discussed ureteroscopy and laser lithotripsy and PCN L  My recommendation is ureteroscopy and laser lithotripsy but she may require an additional procedure due to the volume of the stone in the upper pole  We will do stent exchange at that time  Risks of bleeding infection damage to the urinary tract and need for additional procedures were explained issues informed consent                            Chief Complaint   No chief complaint on file         History of Present Illness   Follow-up left ureteral stenting for infected 6 mm left proximal ureteral calculus and left renal calculi on 2021 by Dr Vita Carnes  Urine culture negative 2021  She has had stones before that she has passed but she has never required surgery  She currently is tolerating the stent quite well  She is normally on Imuran for rheumatoid arthritis  We reviewed her films together and shows a partial staghorn calculus of the left upper pole and a 6 mm stone in the proximal left ureter  No other stones  Normal right kidney    Some hydronephrosis due to ureteral stone      The following portions of the patient's history were reviewed and updated as appropriate: allergies, current medications, past family history, past medical history, past social history, past surgical history and problem list   Medical History        Past Medical History:   Diagnosis Date    Breast cancer ([facility]      H/O cervical fracture      Rheumatoid arthritis ([facility] )          Surgical History         Past Surgical History:   Procedure Laterality Date    BLADDER SUSPENSION        BREAST LUMPECTOMY         SECTION        FL RETROGRADE PYELOGRAM   2021    HYSTERECTOMY        HI CYSTOURETHROSCOPY,URETER CATHETER Left 2021     Procedure: CYSTOSCOPY RETROGRADE PYELOGRAM WITH INSERTION STENT URETERAL;  Surgeon: Mary Montilla MD;  Location: [facility];  Service: Urology        shoulder  Review of Systems   Review of Systems   Constitutional: Negative for fever  Cardiovascular: Negative for chest pain  Genitourinary: Negative           Active Problem List          Patient Active Problem List   Diagnosis    Obstructing left ureteral stone with mild hydronephrosis and pyelonephritis    Hydronephrosis of left kidney    Pyelonephritis of left kidney    Rheumatoid arthritis ([facility]    Sepsis ([facility]    [facility]    Gastroesophageal reflux disease without esophagitis    Malignant neoplasm of female breast ([facility] )    Hyperlipidemia    Obesity (BMI 30 0-34  9)         Objective   /60   Pulse 75   Ht 5' 4" (1 626 m)   Wt 89 4 kg (197 lb)   BMI 33 81 kg/m²      Physical Exam  Vitals signs reviewed  Constitutional:       Appearance: Normal appearance  HENT:      Head: Normocephalic and atraumatic  Eyes:      Extraocular Movements: Extraocular movements intact  Neck:      Musculoskeletal: Normal range of motion  Cardiovascular:      Rate and Rhythm: Normal rate and regular rhythm  Heart sounds: Normal heart sounds  No murmur  No friction rub  No gallop  Pulmonary:      Effort: Pulmonary effort is normal  No respiratory distress  Breath sounds: Normal breath sounds  No stridor  No wheezing  Musculoskeletal: Normal range of motion     Skin:     Coloration: Skin is not jaundiced or pale  Neurological:      General: No focal deficit present  Mental Status: She is alert and oriented to person, place, and time  Psychiatric:         Mood and Affect: Mood normal          Behavior: Behavior normal          Thought Content:  Thought content normal          Judgment: Judgment normal                   Current Medications      Current Outpatient Medications:     acetaminophen (Tylenol) 325 mg tablet, Take by mouth, Disp: , Rfl:     azaTHIOprine (IMURAN) 50 mg tablet, Take 50 mg by mouth daily, Disp: , Rfl:     Cholecalciferol 50 MCG (2000 UT) CAPS, Take 1 capsule by mouth, Disp: , Rfl:     cyanocobalamin (VITAMIN B-12) 1000 MCG tablet, Take by mouth, Disp: , Rfl:     Diclofenac Sodium (VOLTAREN) 1 %, diclofenac 1 % topical gel  APPLY 2 GRAMS TO THE AFFECTED AREA(S) BY TOPICAL ROUTE 4 TIMES PER DAY, Disp: , Rfl:     ezetimibe (ZETIA) 10 mg tablet, Take 10 mg by mouth daily, Disp: , Rfl:     fluticasone (FLONASE) 50 mcg/act nasal spray, 1 spray into each nostril, Disp: , Rfl:     Magnesium 100 MG CAPS, Take by mouth, Disp: , Rfl:     montelukast (SINGULAIR) 10 mg tablet, Take 10 mg by mouth daily, Disp: , Rfl:     Multiple Vitamins-Minerals (Multivitamin Gummies Womens) Rock ONEAL, Disp: , Rfl:     oxybutynin (DITROPAN-XL) 5 mg 24 hr tablet, Take 1 tablet (5 mg total) by mouth daily, Disp: 30 tablet, Rfl: 0    pantoprazole (PROTONIX) 40 mg tablet, Take 40 mg by mouth daily, Disp: , Rfl:     tamsulosin (FLOMAX) 0 4 mg, Take 2 capsules (0 8 mg total) by mouth daily with dinner for 7 days, Disp: 14 capsule, Rfl: 0    oxyCODONE-acetaminophen (PERCOCET) 5-325 mg per tablet, Take 1 tablet by mouth every 8 (eight) hours as needed for moderate pain for up to 10 daysMax Daily Amount: 3 tablets (Patient not taking: Reported on 4/15/2021), Disp: 12 tablet, Rfl: 0           Carey Welch MD               Electronically signed by Carey Welch MD at 4/15/2021 11:25 AM

## 2021-05-19 NOTE — INTERVAL H&P NOTE
H&P reviewed  After examining the patient I find no changes in the patients condition since the H&P had been written      Vitals:    05/19/21 1318   BP: 118/76   Pulse: 77   Resp: 18   Temp: 98 2 °F (36 8 °C)   SpO2: 97%

## 2021-05-19 NOTE — DISCHARGE INSTRUCTIONS
Expect to see blood in the urine, and to experience urgency/frequency/burning with urination and dribbling  This is normal after urological procedures  Is also normal to experience some nausea after these procedures  Go back your regular diet carefully  It is normal to feel pain in the kidney when urinating and when the bladder is filling due to urine refluxing up to the kidney because of the open stent  The stent is necessary to keep the ureter open to allow clots and swelling to resolve and to allow the kidney to drain properly after instrumentation  Some stones may pass around the stent, but most stones pass after the stent is removed  The presence of the stent makes the ureter wider while it is in and after has been removed, allowing passage of larger fragments  You will need the stent removed by cystoscopy in 2 weeks in the office  The procedure went well, and I broke up the large stone into tiny passable  dustlike fragments  Call for fever greater that 101 5, inability to urinate, prolonged nausea and vomiting, or severe pain not relieved by pain medications         No driving/operating machinery for 24 hours, and while taking narcotics  Take over the counter remedy of choice to avoid constipation  Drink plenty of fluids

## 2021-05-19 NOTE — ANESTHESIA PREPROCEDURE EVALUATION
Procedure:  CYSTOSCOPY URETEROSCOPY WITH LITHOTRIPSY HOLMIUM LASER, RETROGRADE PYELOGRAM AND INSERTION STENT URETERAL (Left Bladder)    Relevant Problems   CARDIO   (+) Hyperlipidemia      GI/HEPATIC   (+) Gastroesophageal reflux disease without esophagitis      /RENAL   (+) Hydronephrosis of left kidney      MUSCULOSKELETAL   (+) Rheumatoid arthritis (HCC) (Last steroid therapy January 2021)      PULMONARY   (+) Sleep apnea (CPAP compliant)      Other   (+) Obesity (BMI 30 0-34  9)        Physical Exam    Airway    Mallampati score: II  TM Distance: >3 FB  Neck ROM: full     Dental   No notable dental hx     Cardiovascular      Pulmonary      Other Findings        Anesthesia Plan  ASA Score- 2     Anesthesia Type- general with ASA Monitors  Additional Monitors:   Airway Plan: LMA  Plan Factors-Exercise tolerance (METS): >4 METS  Existing labs reviewed  Patient summary reviewed  Patient is not a current smoker  Obstructive sleep apnea risk education given perioperatively  Induction- intravenous  Postoperative Plan- Plan for postoperative opioid use  Informed Consent- Anesthetic plan and risks discussed with patient  I personally reviewed this patient with the CRNA  Discussed and agreed on the Anesthesia Plan with the CRNA  Amanda Monzon

## 2021-05-19 NOTE — ANESTHESIA POSTPROCEDURE EVALUATION
Post-Op Assessment Note    CV Status:  Stable  Pain Score: 0    Pain management: adequate     Mental Status:  Awake   PONV Controlled:  None   Airway Patency:  Patent   Two or more mitigation strategies used for obstructive sleep apnea   Post Op Vitals Reviewed: Yes      Staff: CRNA         No complications documented      BP      Temp      Pulse     Resp      SpO2

## 2021-05-19 NOTE — OP NOTE
OPERATIVE REPORT  PATIENT NAME: Lucy Hernadez  :  1960  MRN: [id number]  Pt Location: [facility] ROOM 06    SURGERY DATE: 2021    Surgeon(s) and Role:     * Romelia Jones MD - Primary    Preop Diagnosis:  Calculus of ureter [n20 1] left kidney calculus    Post-Op Diagnosis Codes:     * Calculus of ureter [N20 1]left, left renal calculus-full staghorn calculus    Procedure(s) (LRB):  CYSTOSCOPY,  Left URETEROSCOPY   LITHOTRISPY HOLMIUM LASER of partial staghorn calculus  INSERTION STENT left URETERAL     Specimen(s):  None    Estimated Blood Loss:   Minimal    Drains: none      Anesthesia Type:   General    Operative Indications:  6mm left ureteral stone, 2 cm partial staghorn calculus    Operative Findings:  Partial staghorn calculus, left ureteral stone pushed up in the kidney-all broken up into tiny dust fragments    Complications:   None    Procedure and Technique:     The patient was brought to the operating room and identified properly  The patient was prepared and draped in the dorsolithotomy position after general anesthesia was induced, and a time out performed  Care was taken during positioning to protect all extremities  Cystoscopy was performed with 22 [place] cysto sheath and 30 and 70 degree lens  The urethra was normal without stricture    The bladder was smooth, nontrabeculated and there were no stones, tumors or other abnormalities  The 0 035 inch wire was fed into the left ureteral orifice and fed up into the renal pelvis  Fluoroscopy revealed a radioopaque/radiolucent stone  A dual lumen catheter was placed over the wire fluoroscopically into the proximal ureter, and a second wire was deplyed  Using one wire clamped to the drapes as a safety wire, a 10 [place] 35 cm access sheath was placed, and the flexible ureteroscope was advanced    A partial staghorn calculus at the UPJ was seen, and holmium laser lithotripsy was carried out with the 272 micron holmium laser fiber, breaking the stone up into small, passable fragments, mostly dust   A lot of the dust came out through the access sheath    A 6 English by 26 cm ureteral stent with no string was deployed, and coils were established in the renal pelvis and bladder  The bladder was drained with the cysto sheath, and the pt awakened         I was present for the entire procedure and A qualified resident physician was not available    Patient Disposition:  PACU  and extubated and stable    SIGNATURE: Karolina Arambula MD

## 2021-05-20 ENCOUNTER — NURSE TRIAGE (OUTPATIENT)
Dept: OTHER | Facility: OTHER | Age: 61
End: 2021-05-20

## 2021-05-20 ENCOUNTER — APPOINTMENT (EMERGENCY)
Dept: CT IMAGING | Facility: HOSPITAL | Age: 61
DRG: 872 | End: 2021-05-20
Payer: COMMERCIAL

## 2021-05-20 ENCOUNTER — TELEPHONE (OUTPATIENT)
Dept: UROLOGY | Facility: MEDICAL CENTER | Age: 61
End: 2021-05-20

## 2021-05-20 ENCOUNTER — TELEPHONE (OUTPATIENT)
Dept: OTHER | Facility: HOSPITAL | Age: 61
End: 2021-05-20

## 2021-05-20 ENCOUNTER — HOSPITAL ENCOUNTER (INPATIENT)
Facility: HOSPITAL | Age: 61
LOS: 2 days | Discharge: HOME | DRG: 872 | End: 2021-05-23
Attending: EMERGENCY MEDICINE | Admitting: FAMILY MEDICINE
Payer: COMMERCIAL

## 2021-05-20 DIAGNOSIS — E87.6 HYPOKALEMIA: ICD-10-CM

## 2021-05-20 DIAGNOSIS — N12 PYELONEPHRITIS: ICD-10-CM

## 2021-05-20 DIAGNOSIS — N20.1 LEFT URETERAL STONE: ICD-10-CM

## 2021-05-20 DIAGNOSIS — N12 PYELONEPHRITIS OF LEFT KIDNEY: ICD-10-CM

## 2021-05-20 DIAGNOSIS — N39.0 URINARY TRACT INFECTION: Primary | ICD-10-CM

## 2021-05-20 DIAGNOSIS — Z96.0 STATUS POST PLACEMENT OF URETERAL STENT: ICD-10-CM

## 2021-05-20 DIAGNOSIS — N17.9 AKI (ACUTE KIDNEY INJURY) (HCC): ICD-10-CM

## 2021-05-20 DIAGNOSIS — N20.0 STAGHORN CALCULUS: Primary | ICD-10-CM

## 2021-05-20 LAB
ALBUMIN SERPL BCP-MCNC: 3.2 G/DL (ref 3.5–5)
ALP SERPL-CCNC: 163 U/L (ref 46–116)
ALT SERPL W P-5'-P-CCNC: 68 U/L (ref 12–78)
ANION GAP SERPL CALCULATED.3IONS-SCNC: 6 MMOL/L (ref 4–13)
APTT PPP: 31 SECONDS (ref 23–37)
AST SERPL W P-5'-P-CCNC: 61 U/L (ref 5–45)
BASOPHILS # BLD AUTO: 0.08 THOUSANDS/ΜL (ref 0–0.1)
BASOPHILS NFR BLD AUTO: 1 % (ref 0–1)
BILIRUB SERPL-MCNC: 0.48 MG/DL (ref 0.2–1)
BILIRUB UR QL STRIP: NEGATIVE
BUN SERPL-MCNC: 17 MG/DL (ref 5–25)
CALCIUM ALBUM COR SERPL-MCNC: 9.1 MG/DL (ref 8.3–10.1)
CALCIUM SERPL-MCNC: 8.5 MG/DL (ref 8.3–10.1)
CHLORIDE SERPL-SCNC: 103 MMOL/L (ref 100–108)
CLARITY UR: ABNORMAL
CO2 SERPL-SCNC: 30 MMOL/L (ref 21–32)
COLOR UR: YELLOW
CREAT SERPL-MCNC: 1.41 MG/DL (ref 0.6–1.3)
EOSINOPHIL # BLD AUTO: 0.23 THOUSAND/ΜL (ref 0–0.61)
EOSINOPHIL NFR BLD AUTO: 2 % (ref 0–6)
ERYTHROCYTE [DISTWIDTH] IN BLOOD BY AUTOMATED COUNT: 13.5 % (ref 11.6–15.1)
GFR SERPL CREATININE-BSD FRML MDRD: 41 ML/MIN/1.73SQ M
GLUCOSE SERPL-MCNC: 108 MG/DL (ref 65–140)
GLUCOSE UR STRIP-MCNC: NEGATIVE MG/DL
HCT VFR BLD AUTO: 35 % (ref 34.8–46.1)
HGB BLD-MCNC: 11 G/DL (ref 11.5–15.4)
HGB UR QL STRIP.AUTO: ABNORMAL
IMM GRANULOCYTES # BLD AUTO: 0.06 THOUSAND/UL (ref 0–0.2)
IMM GRANULOCYTES NFR BLD AUTO: 1 % (ref 0–2)
INR PPP: 0.96 (ref 0.84–1.19)
KETONES UR STRIP-MCNC: NEGATIVE MG/DL
LACTATE SERPL-SCNC: 0.5 MMOL/L (ref 0.5–2)
LEUKOCYTE ESTERASE UR QL STRIP: ABNORMAL
LIPASE SERPL-CCNC: 90 U/L (ref 73–393)
LYMPHOCYTES # BLD AUTO: 1.04 THOUSANDS/ΜL (ref 0.6–4.47)
LYMPHOCYTES NFR BLD AUTO: 8 % (ref 14–44)
MCH RBC QN AUTO: 28.3 PG (ref 26.8–34.3)
MCHC RBC AUTO-ENTMCNC: 31.4 G/DL (ref 31.4–37.4)
MCV RBC AUTO: 90 FL (ref 82–98)
MONOCYTES # BLD AUTO: 1.26 THOUSAND/ΜL (ref 0.17–1.22)
MONOCYTES NFR BLD AUTO: 10 % (ref 4–12)
NEUTROPHILS # BLD AUTO: 9.87 THOUSANDS/ΜL (ref 1.85–7.62)
NEUTS SEG NFR BLD AUTO: 78 % (ref 43–75)
NITRITE UR QL STRIP: NEGATIVE
NRBC BLD AUTO-RTO: 0 /100 WBCS
PH UR STRIP.AUTO: 6.5 [PH]
PLATELET # BLD AUTO: 226 THOUSANDS/UL (ref 149–390)
PMV BLD AUTO: 9.9 FL (ref 8.9–12.7)
POTASSIUM SERPL-SCNC: 3.4 MMOL/L (ref 3.5–5.3)
PROT SERPL-MCNC: 7.2 G/DL (ref 6.4–8.2)
PROT UR STRIP-MCNC: ABNORMAL MG/DL
PROTHROMBIN TIME: 12.6 SECONDS (ref 11.6–14.5)
RBC # BLD AUTO: 3.89 MILLION/UL (ref 3.81–5.12)
SODIUM SERPL-SCNC: 139 MMOL/L (ref 136–145)
SP GR UR STRIP.AUTO: 1.01 (ref 1–1.03)
UROBILINOGEN UR QL STRIP.AUTO: 0.2 E.U./DL
WBC # BLD AUTO: 12.54 THOUSAND/UL (ref 4.31–10.16)

## 2021-05-20 PROCEDURE — 83605 ASSAY OF LACTIC ACID: CPT | Performed by: EMERGENCY MEDICINE

## 2021-05-20 PROCEDURE — 85730 THROMBOPLASTIN TIME PARTIAL: CPT | Performed by: EMERGENCY MEDICINE

## 2021-05-20 PROCEDURE — 83690 ASSAY OF LIPASE: CPT | Performed by: EMERGENCY MEDICINE

## 2021-05-20 PROCEDURE — 85025 COMPLETE CBC W/AUTO DIFF WBC: CPT | Performed by: EMERGENCY MEDICINE

## 2021-05-20 PROCEDURE — 96361 HYDRATE IV INFUSION ADD-ON: CPT

## 2021-05-20 PROCEDURE — 74176 CT ABD & PELVIS W/O CONTRAST: CPT

## 2021-05-20 PROCEDURE — 99285 EMERGENCY DEPT VISIT HI MDM: CPT

## 2021-05-20 PROCEDURE — 80053 COMPREHEN METABOLIC PANEL: CPT | Performed by: EMERGENCY MEDICINE

## 2021-05-20 PROCEDURE — 85610 PROTHROMBIN TIME: CPT | Performed by: EMERGENCY MEDICINE

## 2021-05-20 PROCEDURE — 87086 URINE CULTURE/COLONY COUNT: CPT | Performed by: EMERGENCY MEDICINE

## 2021-05-20 PROCEDURE — 81001 URINALYSIS AUTO W/SCOPE: CPT | Performed by: EMERGENCY MEDICINE

## 2021-05-20 PROCEDURE — 99285 EMERGENCY DEPT VISIT HI MDM: CPT | Performed by: EMERGENCY MEDICINE

## 2021-05-20 PROCEDURE — 36415 COLL VENOUS BLD VENIPUNCTURE: CPT | Performed by: EMERGENCY MEDICINE

## 2021-05-20 RX ORDER — TAMSULOSIN HYDROCHLORIDE 0.4 MG/1
0.4 CAPSULE ORAL
Qty: 30 CAPSULE | Refills: 0 | Status: SHIPPED | OUTPATIENT
Start: 2021-05-20 | End: 2021-06-14

## 2021-05-20 RX ADMIN — SODIUM CHLORIDE 1000 ML: 0.9 INJECTION, SOLUTION INTRAVENOUS at 23:26

## 2021-05-20 NOTE — TELEPHONE ENCOUNTER
Please help with scheduling    ----- Message from Darwin Causey MD sent at 5/19/2021  4:32 PM EDT -----  Please call patient with an appointment for stent removal with cystoscopy in 2 weeks-can be done in [place] or in [facility]

## 2021-05-20 NOTE — TELEPHONE ENCOUNTER
Pt called in stating she had stents placed yesterday and is now c/o severe back pain  Pt stated she the pain medication is only working for about 30 mins before she is in severe pain again  She is also taking the medication for spasms and nothing is helping  On call provider contacted to see if pt should be seen in the ED   On call provider stated pt can take Tylenol and or motrin around the clock to help with pain and to continue with the heating pad  Provider will call in Flomax to her pharmacy to help with her symptoms as well  Pt should be seen in the ED if pain gets worse or if all of these recommendations do not help as all  I called and spoke with pt  Relayed message from the provider  Pt is aware of the plan of care and will follow  Reason for Disposition   [1] SEVERE abdominal pain AND [2] present > 1 hour    Answer Assessment - Initial Assessment Questions  1  ONSET: "When did the pain begin?"       Started yesterday   2  LOCATION: "Where does it hurt?" (upper, mid or lower back)      Back area   3  SEVERITY: "How bad is the pain?"  (e g , Scale 1-10; mild, moderate, or severe)    - MILD (1-3): doesn't interfere with normal activities     - MODERATE (4-7): interferes with normal activities or awakens from sleep     - SEVERE (8-10): excruciating pain, unable to do any normal activities       Severe  4  PATTERN: "Is the pain constant?" (e g , yes, no; constant, intermittent)       Constant   5  RADIATION: "Does the pain shoot into your legs or elsewhere?"      To front of abdomen area   6  CAUSE:  "What do you think is causing the back pain?"       Recent kidney surgery   7  BACK OVERUSE:  "Any recent lifting of heavy objects, strenuous work or exercise?"      Denies   8  MEDICATIONS: "What have you taken so far for the pain?" (e g , nothing, acetaminophen, NSAIDS)     Pain medication and spasm medication   9   NEUROLOGIC SYMPTOMS: "Do you have any weakness, numbness, or problems with bowel/bladder control?"     Denies   10   OTHER SYMPTOMS: "Do you have any other symptoms?" (e g , fever, abdominal pain, burning with urination, blood in urine)        Abdominal pain and hurts when urinating    Protocols used: BACK PAIN-ADULT-AH

## 2021-05-20 NOTE — TELEPHONE ENCOUNTER
Ary France PA-C 1 hour ago (9:00 AM)        Home health nurse called overnight  Patient is postop day 1 for ureteroscopy with left stent insertion with Dr Gerhardt Carbon  the patient was experiencing severe back pain  Believe this to be stent colic  Patient currently has oxybutynin, Pyridium  I prescribed patient Flomax and discussed that I would send this to her pharmacy    Discussed the patient can take Tylenol ibuprofen alternating for pain as well as her pain medications that she was prescribed, heating pad, oxybutynin, Pyridium and new addition of Flomax      Discussed that if patient continues to experience severe flank pain that she should go to the emergency room for evaluation of proper stent placement      Please call patient later this afternoon re-evaluate her symptoms       Thank you          Documentation

## 2021-05-20 NOTE — TELEPHONE ENCOUNTER
Home health nurse called overnight  Patient is postop day 1 for ureteroscopy with left stent insertion with Dr Gregoria Whitman  the patient was experiencing severe back pain  Believe this to be stent colic  Patient currently has oxybutynin, Pyridium  I prescribed patient Flomax and discussed that I would send this to her pharmacy  Discussed the patient can take Tylenol ibuprofen alternating for pain as well as her pain medications that she was prescribed, heating pad, oxybutynin, Pyridium and new addition of Flomax  Discussed that if patient continues to experience severe flank pain that she should go to the emergency room for evaluation of proper stent placement  Please call patient later this afternoon re-evaluate her symptoms       Thank you

## 2021-05-21 PROBLEM — N17.9 SEPSIS WITH ACUTE RENAL FAILURE WITHOUT SEPTIC SHOCK (HCC): Status: ACTIVE | Noted: 2021-05-21

## 2021-05-21 PROBLEM — R65.20 SEPSIS WITH ACUTE RENAL FAILURE WITHOUT SEPTIC SHOCK (HCC): Status: ACTIVE | Noted: 2021-05-21

## 2021-05-21 PROBLEM — Z86.39 HISTORY OF HYPERLIPIDEMIA: Status: ACTIVE | Noted: 2021-04-08

## 2021-05-21 PROBLEM — A41.9 SEPSIS WITH ACUTE RENAL FAILURE WITHOUT SEPTIC SHOCK (HCC): Status: ACTIVE | Noted: 2021-05-21

## 2021-05-21 LAB
ANION GAP SERPL CALCULATED.3IONS-SCNC: 6 MMOL/L (ref 4–13)
BACTERIA UR QL AUTO: ABNORMAL /HPF
BASOPHILS # BLD AUTO: 0.07 THOUSANDS/ΜL (ref 0–0.1)
BASOPHILS NFR BLD AUTO: 1 % (ref 0–1)
BUN SERPL-MCNC: 13 MG/DL (ref 5–25)
CALCIUM SERPL-MCNC: 8.4 MG/DL (ref 8.3–10.1)
CHLORIDE SERPL-SCNC: 107 MMOL/L (ref 100–108)
CO2 SERPL-SCNC: 27 MMOL/L (ref 21–32)
CREAT SERPL-MCNC: 1.27 MG/DL (ref 0.6–1.3)
EOSINOPHIL # BLD AUTO: 0.2 THOUSAND/ΜL (ref 0–0.61)
EOSINOPHIL NFR BLD AUTO: 2 % (ref 0–6)
ERYTHROCYTE [DISTWIDTH] IN BLOOD BY AUTOMATED COUNT: 13.6 % (ref 11.6–15.1)
GFR SERPL CREATININE-BSD FRML MDRD: 46 ML/MIN/1.73SQ M
GLUCOSE SERPL-MCNC: 109 MG/DL (ref 65–140)
HCT VFR BLD AUTO: 35.3 % (ref 34.8–46.1)
HGB BLD-MCNC: 11.1 G/DL (ref 11.5–15.4)
IMM GRANULOCYTES # BLD AUTO: 0.05 THOUSAND/UL (ref 0–0.2)
IMM GRANULOCYTES NFR BLD AUTO: 1 % (ref 0–2)
LYMPHOCYTES # BLD AUTO: 0.72 THOUSANDS/ΜL (ref 0.6–4.47)
LYMPHOCYTES NFR BLD AUTO: 7 % (ref 14–44)
MAGNESIUM SERPL-MCNC: 2.2 MG/DL (ref 1.6–2.6)
MCH RBC QN AUTO: 28.5 PG (ref 26.8–34.3)
MCHC RBC AUTO-ENTMCNC: 31.4 G/DL (ref 31.4–37.4)
MCV RBC AUTO: 91 FL (ref 82–98)
MONOCYTES # BLD AUTO: 0.9 THOUSAND/ΜL (ref 0.17–1.22)
MONOCYTES NFR BLD AUTO: 9 % (ref 4–12)
NEUTROPHILS # BLD AUTO: 8.03 THOUSANDS/ΜL (ref 1.85–7.62)
NEUTS SEG NFR BLD AUTO: 80 % (ref 43–75)
NON-SQ EPI CELLS URNS QL MICRO: ABNORMAL /HPF
NRBC BLD AUTO-RTO: 0 /100 WBCS
PLATELET # BLD AUTO: 202 THOUSANDS/UL (ref 149–390)
PMV BLD AUTO: 10.3 FL (ref 8.9–12.7)
POTASSIUM SERPL-SCNC: 3.7 MMOL/L (ref 3.5–5.3)
PROCALCITONIN SERPL-MCNC: 0.26 NG/ML
RBC # BLD AUTO: 3.89 MILLION/UL (ref 3.81–5.12)
RBC #/AREA URNS AUTO: ABNORMAL /HPF
SODIUM SERPL-SCNC: 140 MMOL/L (ref 136–145)
WBC # BLD AUTO: 9.97 THOUSAND/UL (ref 4.31–10.16)
WBC #/AREA URNS AUTO: ABNORMAL /HPF
WBC CLUMPS # UR AUTO: PRESENT /UL

## 2021-05-21 PROCEDURE — 87040 BLOOD CULTURE FOR BACTERIA: CPT | Performed by: NURSE PRACTITIONER

## 2021-05-21 PROCEDURE — 80048 BASIC METABOLIC PNL TOTAL CA: CPT | Performed by: NURSE PRACTITIONER

## 2021-05-21 PROCEDURE — 84145 PROCALCITONIN (PCT): CPT | Performed by: NURSE PRACTITIONER

## 2021-05-21 PROCEDURE — 99254 IP/OBS CNSLTJ NEW/EST MOD 60: CPT

## 2021-05-21 PROCEDURE — 99222 1ST HOSP IP/OBS MODERATE 55: CPT | Performed by: NURSE PRACTITIONER

## 2021-05-21 PROCEDURE — 96365 THER/PROPH/DIAG IV INF INIT: CPT

## 2021-05-21 PROCEDURE — NC001 PR NO CHARGE

## 2021-05-21 PROCEDURE — 85025 COMPLETE CBC W/AUTO DIFF WBC: CPT | Performed by: NURSE PRACTITIONER

## 2021-05-21 PROCEDURE — 83735 ASSAY OF MAGNESIUM: CPT | Performed by: NURSE PRACTITIONER

## 2021-05-21 PROCEDURE — 96361 HYDRATE IV INFUSION ADD-ON: CPT

## 2021-05-21 RX ORDER — OXYBUTYNIN CHLORIDE 5 MG/1
5 TABLET ORAL 3 TIMES DAILY
Status: DISCONTINUED | OUTPATIENT
Start: 2021-05-21 | End: 2021-05-23 | Stop reason: HOSPADM

## 2021-05-21 RX ORDER — CEFTRIAXONE 1 G/50ML
1000 INJECTION, SOLUTION INTRAVENOUS ONCE
Status: DISCONTINUED | OUTPATIENT
Start: 2021-05-22 | End: 2021-05-21

## 2021-05-21 RX ORDER — CEFTRIAXONE 1 G/50ML
1000 INJECTION, SOLUTION INTRAVENOUS EVERY 12 HOURS
Status: DISCONTINUED | OUTPATIENT
Start: 2021-05-21 | End: 2021-05-23 | Stop reason: HOSPADM

## 2021-05-21 RX ORDER — CEFTRIAXONE 1 G/50ML
1000 INJECTION, SOLUTION INTRAVENOUS EVERY 12 HOURS
Status: DISCONTINUED | OUTPATIENT
Start: 2021-05-21 | End: 2021-05-21

## 2021-05-21 RX ORDER — MONTELUKAST SODIUM 10 MG/1
10 TABLET ORAL DAILY
Status: DISCONTINUED | OUTPATIENT
Start: 2021-05-21 | End: 2021-05-23 | Stop reason: HOSPADM

## 2021-05-21 RX ORDER — ASCORBIC ACID 500 MG
500 TABLET ORAL DAILY
Status: DISCONTINUED | OUTPATIENT
Start: 2021-05-21 | End: 2021-05-23 | Stop reason: HOSPADM

## 2021-05-21 RX ORDER — CEFTRIAXONE 1 G/50ML
1000 INJECTION, SOLUTION INTRAVENOUS EVERY 24 HOURS
Status: DISCONTINUED | OUTPATIENT
Start: 2021-05-22 | End: 2021-05-21

## 2021-05-21 RX ORDER — MELATONIN
2000 DAILY
Status: DISCONTINUED | OUTPATIENT
Start: 2021-05-21 | End: 2021-05-23 | Stop reason: HOSPADM

## 2021-05-21 RX ORDER — ONDANSETRON 2 MG/ML
4 INJECTION INTRAMUSCULAR; INTRAVENOUS EVERY 6 HOURS PRN
Status: DISCONTINUED | OUTPATIENT
Start: 2021-05-21 | End: 2021-05-23 | Stop reason: HOSPADM

## 2021-05-21 RX ORDER — TAMSULOSIN HYDROCHLORIDE 0.4 MG/1
0.4 CAPSULE ORAL
Status: DISCONTINUED | OUTPATIENT
Start: 2021-05-21 | End: 2021-05-23 | Stop reason: HOSPADM

## 2021-05-21 RX ORDER — ACETAMINOPHEN 325 MG/1
650 TABLET ORAL EVERY 4 HOURS PRN
Status: DISCONTINUED | OUTPATIENT
Start: 2021-05-21 | End: 2021-05-23 | Stop reason: HOSPADM

## 2021-05-21 RX ORDER — PANTOPRAZOLE SODIUM 40 MG/1
40 TABLET, DELAYED RELEASE ORAL
Status: DISCONTINUED | OUTPATIENT
Start: 2021-05-21 | End: 2021-05-23 | Stop reason: HOSPADM

## 2021-05-21 RX ORDER — EZETIMIBE 10 MG/1
10 TABLET ORAL DAILY
Status: DISCONTINUED | OUTPATIENT
Start: 2021-05-21 | End: 2021-05-23 | Stop reason: HOSPADM

## 2021-05-21 RX ORDER — CEFTRIAXONE 1 G/50ML
1000 INJECTION, SOLUTION INTRAVENOUS ONCE
Status: COMPLETED | OUTPATIENT
Start: 2021-05-21 | End: 2021-05-21

## 2021-05-21 RX ORDER — SODIUM CHLORIDE 9 MG/ML
75 INJECTION, SOLUTION INTRAVENOUS CONTINUOUS
Status: DISCONTINUED | OUTPATIENT
Start: 2021-05-21 | End: 2021-05-22

## 2021-05-21 RX ADMIN — OXYBUTYNIN CHLORIDE 5 MG: 5 TABLET ORAL at 12:20

## 2021-05-21 RX ADMIN — MONTELUKAST 10 MG: 10 TABLET, FILM COATED ORAL at 08:55

## 2021-05-21 RX ADMIN — SODIUM CHLORIDE 125 ML/HR: 0.9 INJECTION, SOLUTION INTRAVENOUS at 04:30

## 2021-05-21 RX ADMIN — OXYCODONE HYDROCHLORIDE AND ACETAMINOPHEN 500 MG: 500 TABLET ORAL at 08:55

## 2021-05-21 RX ADMIN — ONDANSETRON 4 MG: 2 INJECTION INTRAMUSCULAR; INTRAVENOUS at 08:03

## 2021-05-21 RX ADMIN — CEFTRIAXONE 1000 MG: 1 INJECTION, SOLUTION INTRAVENOUS at 01:01

## 2021-05-21 RX ADMIN — CEFTRIAXONE 1000 MG: 1 INJECTION, SOLUTION INTRAVENOUS at 12:25

## 2021-05-21 RX ADMIN — TAMSULOSIN HYDROCHLORIDE 0.4 MG: 0.4 CAPSULE ORAL at 16:56

## 2021-05-21 RX ADMIN — MULTIPLE VITAMINS W/ MINERALS TAB 1 TABLET: TAB at 08:55

## 2021-05-21 RX ADMIN — ACETAMINOPHEN 650 MG: 325 TABLET ORAL at 16:56

## 2021-05-21 RX ADMIN — OXYBUTYNIN CHLORIDE 5 MG: 5 TABLET ORAL at 16:56

## 2021-05-21 RX ADMIN — CYANOCOBALAMIN TAB 500 MCG 1000 MCG: 500 TAB at 08:55

## 2021-05-21 RX ADMIN — SODIUM CHLORIDE 125 ML/HR: 0.9 INJECTION, SOLUTION INTRAVENOUS at 01:02

## 2021-05-21 RX ADMIN — Medication 2000 UNITS: at 08:55

## 2021-05-21 RX ADMIN — PANTOPRAZOLE SODIUM 40 MG: 40 TABLET, DELAYED RELEASE ORAL at 06:05

## 2021-05-21 RX ADMIN — EZETIMIBE 10 MG: 10 TABLET ORAL at 08:55

## 2021-05-21 RX ADMIN — ACETAMINOPHEN 650 MG: 325 TABLET ORAL at 08:02

## 2021-05-21 RX ADMIN — OXYBUTYNIN CHLORIDE 5 MG: 5 TABLET ORAL at 22:32

## 2021-05-21 RX ADMIN — SODIUM CHLORIDE 75 ML/HR: 0.9 INJECTION, SOLUTION INTRAVENOUS at 12:26

## 2021-05-21 NOTE — ED PROVIDER NOTES
History  Chief Complaint   Patient presents with    Fever - 9 weeks to 74 years     Pt reports having the kidney stones blasted in her left kidney yesterday, started with a low grade fevers today, tmax 100 9 today, took tylenol @ 2130  Called On call triage line who spoke with oncall urologist who stated she should come to the ED for eval      Patient is a 42-year-old female with history of kidney stone in left kidney yesterday underwent cystoscopy with laser lithotripsy and ureteral stent placement presents the emergency department tonight for evaluation of low-grade fever 100 9 as well as left flank pain postoperatively  Patient currently on Bactrim  History provided by:  Patient  Fever - 9 weeks to 74 years  Max temp prior to arrival:  100 9  Temp source:  Oral  Severity:  Moderate  Onset quality:  Sudden  Duration:  1 day  Timing:  Constant  Progression:  Worsening  Chronicity:  New  Associated symptoms: no chest pain, no chills, no congestion, no cough, no diarrhea, no dysuria, no ear pain, no headaches, no myalgias, no nausea, no rash, no rhinorrhea, no sore throat and no vomiting        Prior to Admission Medications   Prescriptions Last Dose Informant Patient Reported? Taking?    Ascorbic Acid (Vitamin C) 500 MG CAPS   Yes No   Sig: Take by mouth   Cholecalciferol 50 MCG (2000 UT) CAPS   Yes No   Sig: Take 1 capsule by mouth daily    Diclofenac Sodium (VOLTAREN) 1 %   Yes No   Sig: diclofenac 1 % topical gel   APPLY 2 GRAMS TO THE AFFECTED AREA(S) BY TOPICAL ROUTE 4 TIMES PER DAY   HYDROcodone-acetaminophen (NORCO) 5-325 mg per tablet   No No   Sig: Take 1-2 tablets by mouth every 6 (six) hours as needed for pain for up to 5 dosesMax Daily Amount: 8 tablets   Magnesium 100 MG CAPS   Yes No   Sig: Take by mouth daily    Multiple Vitamins-Minerals (Multivitamin Gummies Womens) CHEW   Yes No   Sig: Chew 1 each daily    acetaminophen (Tylenol) 325 mg tablet   Yes No   Sig: Take 325 mg by mouth as needed cyanocobalamin (VITAMIN B-12) 1000 MCG tablet   Yes No   Sig: Take by mouth   ezetimibe (ZETIA) 10 mg tablet   Yes No   Sig: Take 10 mg by mouth daily    fluticasone (FLONASE) 50 mcg/act nasal spray   Yes No   Si spray into each nostril as needed    leflunomide (ARAVA) 20 MG tablet   Yes No   Sig: Take 20 mg by mouth daily   montelukast (SINGULAIR) 10 mg tablet   Yes No   Sig: Take 10 mg by mouth daily   oxybutynin (DITROPAN) 5 mg tablet   No No   Sig: Take 1 tablet (5 mg total) by mouth 3 (three) times a day as needed (bladder spasm)   pantoprazole (PROTONIX) 40 mg tablet   Yes No   Sig: Take 40 mg by mouth daily   phenazopyridine (PYRIDIUM) 200 mg tablet   No No   Sig: Take 1 tablet (200 mg total) by mouth every 8 (eight) hours as needed for bladder spasms   sulfamethoxazole-trimethoprim (BACTRIM DS) 800-160 mg per tablet   No No   Sig: Take 1 tablet by mouth every 12 (twelve) hours for 7 days   tamsulosin (FLOMAX) 0 4 mg   No No   Sig: Take 1 capsule (0 4 mg total) by mouth daily with dinner   vitamin B-12 (VITAMIN B-12) 1,000 mcg tablet   Yes No   Sig: Take by mouth daily      Facility-Administered Medications: None       Past Medical History:   Diagnosis Date    Breast cancer ([place] [address] )     Pt had in left breast- had radiation and surgery    CPAP (continuous positive airway pressure) dependence     Pt uses nightly    GERD (gastroesophageal reflux disease)     H/O cervical fracture     Hyperlipidemia     Irregular heart beat     Pt is taking Zetia   Pt does not have happen anymore per pt since on medication    Rheumatoid arthritis ([place] [address] )     Seasonal allergies     Sleep apnea     Wears glasses        Past Surgical History:   Procedure Laterality Date    BLADDER SUSPENSION      BREAST LUMPECTOMY       SECTION      COLONOSCOPY      EGD      FL RETROGRADE PYELOGRAM  2021    HYSTERECTOMY      NM CYSTO/URETERO W/LITHOTRIPSY &INDWELL STENT INSRT Left 2021    Procedure: CYSTOSCOPY URETEROSCOPY WITH LITHOTRIPSY HOLMIUM LASER,  AND INSERTION STENT URETERAL;  Surgeon: Carey Welch MD;  [facility];  Service: Urology    AL CYSTOURETHROSCOPY,URETER CATHETER Left 2021    Procedure: CYSTOSCOPY RETROGRADE PYELOGRAM WITH INSERTION STENT URETERAL;  Surgeon: Shar Osorio MD;  [facility];  Service: Urology    SINUS SURGERY      Deviated septum       Family History   Problem Relation Age of Onset    Lung cancer Father     Heart disease Maternal Aunt     Heart disease Maternal Uncle     Kidney cancer Maternal Grandfather     Lung cancer Maternal Grandfather     Heart attack Brother      I have reviewed and agree with the history as documented  E-Cigarette/Vaping    E-Cigarette Use Never User      E-Cigarette/Vaping Substances     Social History     Tobacco Use    Smoking status: Former Smoker     Quit date:      Years since quittin 3    Smokeless tobacco: Never Used   Substance Use Topics    Alcohol use: Yes     Frequency: Monthly or less     Drinks per session: 1 or 2    Drug use: Not Currently       Review of Systems   Constitutional: Positive for fever  Negative for activity change, appetite change, chills and fatigue  HENT: Negative for congestion, ear pain, rhinorrhea and sore throat  Eyes: Negative for discharge, redness and visual disturbance  Respiratory: Negative for cough, chest tightness, shortness of breath and wheezing  Cardiovascular: Negative for chest pain and palpitations  Gastrointestinal: Negative for abdominal pain, constipation, diarrhea, nausea and vomiting  Endocrine: Negative for polydipsia and polyuria  Genitourinary: Positive for difficulty urinating, flank pain and frequency  Negative for dysuria, hematuria and urgency  Musculoskeletal: Negative for arthralgias and myalgias  Skin: Negative for color change, pallor and rash     Neurological: Negative for dizziness, weakness, light-headedness, numbness and headaches  Hematological: Negative for adenopathy  Does not bruise/bleed easily  All other systems reviewed and are negative  Physical Exam  Physical Exam  Vitals signs and nursing note reviewed  Constitutional:       Appearance: She is well-developed  HENT:      Head: Normocephalic and atraumatic  Right Ear: External ear normal       Left Ear: External ear normal       Nose: Nose normal    Eyes:      Conjunctiva/sclera: Conjunctivae normal       Pupils: Pupils are equal, round, and reactive to light  Neck:      Musculoskeletal: Normal range of motion and neck supple  Cardiovascular:      Rate and Rhythm: Normal rate and regular rhythm  Heart sounds: Normal heart sounds  Pulmonary:      Effort: Pulmonary effort is normal  No respiratory distress  Breath sounds: Normal breath sounds  No wheezing or rales  Chest:      Chest wall: No tenderness  Abdominal:      General: Bowel sounds are normal  There is no distension  Palpations: Abdomen is soft  Tenderness: There is abdominal tenderness in the suprapubic area  There is left CVA tenderness  There is no guarding  Musculoskeletal: Normal range of motion  Skin:     General: Skin is warm and dry  Neurological:      Mental Status: She is alert and oriented to person, place, and time  Cranial Nerves: No cranial nerve deficit  Sensory: No sensory deficit           Vital Signs  ED Triage Vitals [05/20/21 2303]   Temperature Pulse Respirations Blood Pressure SpO2   98 2 °F (36 8 °C) 83 19 142/62 96 %      Temp Source Heart Rate Source Patient Position - Orthostatic VS BP Location FiO2 (%)   Temporal Monitor Lying Left arm --      Pain Score       3           Vitals:    05/20/21 2303   BP: 142/62   Pulse: 83   Patient Position - Orthostatic VS: Lying         Visual Acuity      ED Medications  Medications   sodium chloride 0 9 % infusion (125 mL/hr Intravenous New Bag 5/21/21 0102)   sodium chloride 0 9 % bolus 1,000 mL (0 mL Intravenous Stopped 5/21/21 0059)   cefTRIAXone (ROCEPHIN) IVPB (premix in dextrose) 1,000 mg 50 mL (0 mg Intravenous Stopped 5/21/21 0136)       Diagnostic Studies  Results Reviewed     Procedure Component Value Units Date/Time    Urine Microscopic [[id number]]  (Abnormal) Collected: 05/20/21 2322    Lab Status: Final result Specimen: Urine, Clean Catch Updated: 05/21/21 0006     RBC, UA 10-20 /hpf      WBC, UA Innumerable /hpf      Epithelial Cells Occasional /hpf      Bacteria, UA Innumerable /hpf      WBC Clumps Present    Urine culture [[id number]] Collected: 05/20/21 2322    Lab Status: In process Specimen: Urine, Clean Catch Updated: 05/21/21 0005    Lactic acid [[id number]]  (Normal) Collected: 05/20/21 2325    Lab Status: Final result Specimen: Blood from Arm, Right Updated: 05/20/21 2354     LACTIC ACID 0 5 mmol/L     Narrative:      Result may be elevated if tourniquet was used during collection      Comprehensive metabolic panel [[id number]]  (Abnormal) Collected: 05/20/21 2325    Lab Status: Final result Specimen: Blood from Arm, Right Updated: 05/20/21 2351     Sodium 139 mmol/L      Potassium 3 4 mmol/L      Chloride 103 mmol/L      CO2 30 mmol/L      ANION GAP 6 mmol/L      BUN 17 mg/dL      Creatinine 1 41 mg/dL      Glucose 108 mg/dL      Calcium 8 5 mg/dL      Corrected Calcium 9 1 mg/dL      AST 61 U/L      ALT 68 U/L      Alkaline Phosphatase 163 U/L      Total Protein 7 2 g/dL      Albumin 3 2 g/dL      Total Bilirubin 0 48 mg/dL      eGFR 41 ml/min/1 73sq m     Narrative:      [place] guidelines for Chronic Kidney Disease (CKD):     Stage 1 with normal or high GFR (GFR > 90 mL/min/1 73 square meters)    Stage 2 Mild CKD (GFR = 60-89 mL/min/1 73 square meters)    Stage 3A Moderate CKD (GFR = 45-59 mL/min/1 73 square meters)    Stage 3B Moderate CKD (GFR = 30-44 mL/min/1 73 square meters)    Stage 4 Severe CKD (GFR = 15-29 mL/min/1 73 square meters)    Stage 5 End Stage CKD (GFR <15 mL/min/1 73 square meters)  Note: GFR calculation is accurate only with a steady state creatinine    Lipase [[id number]]  (Normal) Collected: 05/20/21 2325    Lab Status: Final result Specimen: Blood from Arm, Right Updated: 05/20/21 2351     Lipase 90 u/L     Protime-INR [[id number]]  (Normal) Collected: 05/20/21 2325    Lab Status: Final result Specimen: Blood from Arm, Right Updated: 05/20/21 2349     Protime 12 6 seconds      INR 0 96    APTT [[id number]]  (Normal) Collected: 05/20/21 2325    Lab Status: Final result Specimen: Blood from Arm, Right Updated: 05/20/21 2349     PTT 31 seconds     UA w Reflex to Microscopic w Reflex to Culture [[id number]]  (Abnormal) Collected: 05/20/21 2322    Lab Status: Final result Specimen: Urine, Clean Catch Updated: 05/20/21 2339     Color, UA Yellow     Clarity, UA Slightly Cloudy     Specific [place], UA 1 010     pH, UA 6 5     Leukocytes, UA Large     Nitrite, UA Negative     Protein,  (2+) mg/dl      Glucose, UA Negative mg/dl      Ketones, UA Negative mg/dl      Urobilinogen, UA 0 2 E U /dl      Bilirubin, UA Negative     Blood, UA Large    CBC and differential [[id number]]  (Abnormal) Collected: 05/20/21 2325    Lab Status: Final result Specimen: Blood from Arm, Right Updated: 05/20/21 2334     WBC 12 54 Thousand/uL      RBC 3 89 Million/uL      Hemoglobin 11 0 g/dL      Hematocrit 35 0 %      MCV 90 fL      MCH 28 3 pg      MCHC 31 4 g/dL      RDW 13 5 %      MPV 9 9 fL      Platelets 563 Thousands/uL      nRBC 0 /100 WBCs      Neutrophils Relative 78 %      Immat GRANS % 1 %      Lymphocytes Relative 8 %      Monocytes Relative 10 %      Eosinophils Relative 2 %      Basophils Relative 1 %      Neutrophils Absolute 9 87 Thousands/µL      Immature Grans Absolute 0 06 Thousand/uL      Lymphocytes Absolute 1 04 Thousands/µL      Monocytes Absolute 1 26 Thousand/µL      Eosinophils Absolute 0 23 Thousand/µL      Basophils Absolute 0 08 Thousands/µL                  CT abdomen pelvis wo contrast   Final Result by Mamadou Godoy MD (05/21 0015)         1  Left ureteral stent in expected position  No hydronephrosis or hydroureter  Residual calculi in left renal calyces measuring up to 10 mm    2   Mild left perinephric and periureteral inflammatory or congestive change  Pyelonephritis not excluded  Workstation performed: [id number]                    Procedures  Procedures         ED Course  ED Course as of May 21 0137   Fri May 21, 2021   0045 Spoke with Urology physician assistant on-call reviewed case and findings in the emergency department she recommends continued IV hydration and antibiotics and monitor kidney function and admit patient to hospitalist service here for now  [address] with Chris Garner hospitalist nurse-practitioner on-call reviewed case and findings in the emergency department recommendations of Urology in management thus far she accepts for admission on behalf Dr Laith Lemus  SBIRT 22yo+      Most Recent Value   SBIRT (24 yo +)   In order to provide better care to our patients, we are screening all of our patients for alcohol and drug use  Would it be okay to ask you these screening questions?   No Filed at: 05/20/2021 2321                    MDM  Number of Diagnoses or Management Options  JIN (acute kidney injury) ([facility] [address] ): new and requires workup  Hypokalemia: new and requires workup  Pyelonephritis: new and requires workup  Status post placement of ureteral stent: new and requires workup  Urinary tract infection: new and requires workup     Amount and/or Complexity of Data Reviewed  Clinical lab tests: ordered and reviewed  Tests in the radiology section of CPT®: ordered and reviewed  Tests in the medicine section of CPT®: ordered and reviewed  Decide to obtain previous medical records or to obtain history from someone other than the patient: yes  Review and summarize past medical records: yes  Independent visualization of images, tracings, or specimens: yes    Risk of Complications, Morbidity, and/or Mortality  Presenting problems: moderate  Diagnostic procedures: moderate  Management options: moderate    Patient Progress  Patient progress: stable      Disposition  Final diagnoses:   Urinary tract infection   Pyelonephritis   Status post placement of ureteral stent   [facility]   Hypokalemia     Time reflects when diagnosis was documented in both MDM as applicable and the Disposition within this note     Time User Action Codes Description Comment    5/21/2021 12:41 AM Marney Plane Add [N39 0] Urinary tract infection     5/21/2021 12:42 AM Marney Plane Add [N12] Pyelonephritis     5/21/2021 12:42 AM Renford Mohs, Raenell Gilbert Add [Z96 0] Status post placement of ureteral stent     5/21/2021 12:42 AM Db Shelby Gilbert Add [N17 9] [facility]     5/21/2021 12:50 AM Marney Plane Add [E87 6] Hypokalemia       ED Disposition     ED Disposition Condition Date/Time Comment    Admit Stable Fri May 21, 2021 12:41 AM Case was discussed with Rozina Quiroz and the patient's admission status was agreed to be Admission Status: inpatient status to the service of Dr Georgiana Welch  Follow-up Information    None         Patient's Medications   Discharge Prescriptions    No medications on file     No discharge procedures on file      PDMP Review       Value Time User    PDMP Reviewed  Yes 5/19/2021  3:33 PM Rebecca Casillas MD          ED Provider  Electronically Signed by               Kiana Francisco DO  05/21/21 2434

## 2021-05-21 NOTE — NURSING NOTE
Patient has fever of 102F, nauseous with L sided flank pain  Gave tylenol and zofran  Dr Mariama Sotelo made aware

## 2021-05-21 NOTE — TELEPHONE ENCOUNTER
Reason for Disposition   Fever > 100 4 F (38 0 C)    Answer Assessment - Initial Assessment Questions  1  SYMPTOM: "What's the main symptom you're concerned about?" (e g , pain, fever, vomiting)      Fever 100 9 post tylenol 1000 mg 30 minutes ago  2  ONSET: "When did fever  start?"     This evening  3  SURGERY: "What surgery was performed?"     Cystoscopy with stent placement  4  DATE of SURGERY: "When was surgery performed?"       5/19/2021  5  ANESTHESIA: " What type of anesthesia did you have?" (e g , general, spinal, epidural, local)      general  6  PAIN: "Is there any pain?" If so, ask: "How bad is it?"  (Scale 1-10; or mild, moderate, severe)     3/10  7  FEVER: "Do you have a fever?" If so, ask: "What is your temperature, how was it measured, and when did it start?"      100 9  8  VOMITING: "Is there any vomiting?" If yes, ask: "How many times?"      none  9  BLEEDING: "Is there any bleeding?" If so, ask: "How much?" and "Where?"      None that is visible   10   OTHER SYMPTOMS: "Do you have any other symptoms?" (e g , drainage from wound, painful urination, constipation)        none    Protocols used: POST-OP SYMPTOMS AND QUESTIONS-[facility]

## 2021-05-21 NOTE — TELEPHONE ENCOUNTER
Dr Kaitlin Linton would like pt see in the ED tonight for evaluation of post op fever  Pt will be going to [facility], [facility]  ED

## 2021-05-21 NOTE — CONSULTS
Consultation - UROLOGY  Lester Claros 63 y o  female MRN: [id number]  Unit/Bed#: -01 Encounter: [id number]    Assessment/Plan     Assessment:  Pyelonephritis left kidney  Post op day #2 s/p cysto, left ureteroscopy, Holmium laser of staghorn calculus, insertion left ureteral stent by Dr Rowena Bee here at [facility] on Wednesday  History of 6 mm proximal left ureteral stone stone with recent urinary tract infection completed full course of Bactrim, she had a partial staghorn calculus of left upper pole  Sepsis present on admission secondary to above, blood cultures x2 obtained pending at this time  Leukocytosis present on admission 12 54, now improved this morning 9 97  Acute kidney injury, creatinine 1 4 on admission, repeated 1 27 this morning  Temp 102° on admission early this morning, most recent 100 1  Rheumatoid arthritis    Plan:  Discussed via TT messaging with Dr Rowena Bee, urologist who performed procedures on the patient this week here  Left ureteral stent is in good position  No indication for any need to reposition or remove left ureteral stent  She will be scheduled for stent removal in the office in 2 weeks and urology has already contacted her to set up that appointment as an outpatient  Treat present left pyelonephritis with current IV antibiotics, Rocephin 1 g q 12 hours, would then need continuation of oral antibiotics upon discharge for another 7 days  Urine culture pending  She had completed a full course of full course of Bactrim prior to her urological surgery 2 days ago  Dr Ingrid Izaguirre recommended that the patient continue on Bactrim DS after hospital discharge and change only if cultures show different organisms  Continue the patient on Flomax daily  Oxybutynin was ordered  Will hold off on continuing pyridium at this point  Monitor fever curve, antipyretics p r n    Patient must be afebrile for 24 hours before hospital discharge  Repeat a m  labs including CBC and kidney function  Await urine and blood cultures    History of Present Illness     HPI:  Ernie Marie is a 63 y o  female who presented to the ED here late last evening with fever, shaking chills which started on Wednesday night at home after she was discharged  She is now postop day 2  After undergoing left ureteroscopy with stent insertion and holmium laser by Dr Nella Norris here at this hospital   She developed nausea and 1 episode of vomiting last night, decreased appetite  Intermittent severe left back and flank pain  Home health nurse had called on Wednesday morning to the Urology Service, it was believed the patient was experiencing stent colic  She was on oxybutynin and Pyridium at home, she was also added daily Flomax at that time  Patient developed shaking chills and a very high fever of 102° in the ED  CT scan of the abdomen and pelvis with below mentioned findings suggested left pyelonephritis  The admitting hospitalist provider did contact the urology AP on-call via tiger text overnight and it was not recommended at any urological intervention needed to be performed  Patient is admitted to the hospitalist service and is currently being treated with IV Rocephin 1 g q 12 hours  Her last temperature was 100 1°  She did initially no some blood in her urine after her surgery on Wednesday  She has been drinking fluids at home, she had noticed that her urine stream was darker in color and she had not been voiding as much  This provider personally tiger text messaged Dr Jeremy Tyler this morning of the overnight findings and admission to the hospitalist service here for his recommendations at this time        SURGERY DATE: 05/19/2021    Operative Indications:  6mm left ureteral stone, 2 cm partial staghorn calculus     Operative Findings:  Partial staghorn calculus, left ureteral stone pushed up in the kidney-all broken up into tiny dust fragments     Surgeon(s) and Role:     * Nella Norris MD - Primary     Preop Diagnosis:  Calculus of ureter [n20 1] left kidney calculus     Post-Op Diagnosis Codes:     * Calculus of ureter [N20 1]left, left renal calculus-full staghorn calculus     Procedure(s) (LRB):  CYSTOSCOPY,  Left URETEROSCOPY   LITHOTRISPY HOLMIUM LASER of partial staghorn calculus  INSERTION STENT left URETERAL        Inpatient consult to Urology  Consult performed by: Sakina Solano PA-C  Consult ordered by: Mamta Moore MD        Review of Systems   Constitutional: Positive for activity change, appetite change, chills and fever  Negative for diaphoresis, fatigue and unexpected weight change  Eyes: Negative  Respiratory: Negative for cough, chest tightness, shortness of breath, wheezing and stridor  Cardiovascular: Negative for chest pain, palpitations and leg swelling  Gastrointestinal: Positive for nausea and vomiting  Negative for abdominal distention, abdominal pain, constipation, diarrhea and rectal pain  Endocrine: Negative  Genitourinary: Positive for decreased urine volume, dysuria, flank pain and hematuria  Negative for difficulty urinating  Musculoskeletal: Positive for arthralgias and back pain  Negative for gait problem, joint swelling, myalgias, neck pain and neck stiffness  Skin: Negative  Allergic/Immunologic: Negative  Neurological: Negative  Hematological: Negative  Psychiatric/Behavioral: Negative  Historical Information   Past Medical History:   Diagnosis Date    Breast cancer ([facility] ) 1998    Pt had in left breast- had radiation and surgery    CPAP (continuous positive airway pressure) dependence     Pt uses nightly    GERD (gastroesophageal reflux disease)     H/O cervical fracture     Hyperlipidemia     Irregular heart beat     Pt is taking Zetia   Pt does not have happen anymore per pt since on medication    Rheumatoid arthritis ([facility] )     Seasonal allergies     Sleep apnea     Wears glasses      Past Surgical History:   Procedure Laterality Date    BLADDER SUSPENSION      BREAST LUMPECTOMY       SECTION      COLONOSCOPY      EGD      FL RETROGRADE PYELOGRAM  2021    HYSTERECTOMY      VA CYSTO/URETERO W/LITHOTRIPSY &INDWELL STENT INSRT Left 2021    Procedure: CYSTOSCOPY URETEROSCOPY WITH LITHOTRIPSY HOLMIUM LASER,  AND INSERTION STENT URETERAL;  Surgeon: Darwin Causey MD;  Location: [facility];  Service: Urology    VA CYSTOURETHROSCOPY,URETER CATHETER Left 2021    Procedure: CYSTOSCOPY RETROGRADE PYELOGRAM WITH INSERTION STENT URETERAL;  Surgeon: Adán Guerrero MD;  Location: [facility];  Service: Urology    SINUS SURGERY      Deviated septum     Social History   Social History     Substance and Sexual Activity   Alcohol Use Yes    Frequency: Monthly or less    Drinks per session: 1 or 2     Social History     Substance and Sexual Activity   Drug Use Not Currently     E-Cigarette/Vaping    E-Cigarette Use Never User      E-Cigarette/Vaping Substances     Social History     Tobacco Use   Smoking Status Former Smoker    Quit date:     Years since quittin 3   Smokeless Tobacco Never Used     Family History: non-contributory    Meds/Allergies   current meds:   Current Facility-Administered Medications   Medication Dose Route Frequency    acetaminophen (TYLENOL) tablet 650 mg  650 mg Oral Q4H PRN    ascorbic acid (VITAMIN C) tablet 500 mg  500 mg Oral Daily    cefTRIAXone (ROCEPHIN) IVPB (premix in dextrose) 1,000 mg 50 mL  1,000 mg Intravenous Q12H    cholecalciferol (VITAMIN D3) tablet 2,000 Units  2,000 Units Oral Daily    cyanocobalamin (VITAMIN B-12) tablet 1,000 mcg  1,000 mcg Oral Daily    ezetimibe (ZETIA) tablet 10 mg  10 mg Oral Daily    montelukast (SINGULAIR) tablet 10 mg  10 mg Oral Daily    multivitamin-minerals (CENTRUM) tablet 1 tablet  1 tablet Oral Daily    ondansetron (ZOFRAN) injection 4 mg  4 mg Intravenous Q6H PRN    pantoprazole (PROTONIX) EC tablet 40 mg  40 mg Oral Early Morning    sodium chloride 0 9 % infusion  75 mL/hr Intravenous Continuous    tamsulosin (FLOMAX) capsule 0 4 mg  0 4 mg Oral Daily With Dinner     Allergies   Allergen Reactions    Azathioprine Swelling     Pt reports face gets "puffy", red and face feels hot   Ciprofloxacin Hives     ? Rash      Amoxicillin-Pot Clavulanate Hives    Penicillins Rash       Objective   First Vitals:   Blood Pressure: 142/62 (05/20/21 2303)  Pulse: 83 (05/20/21 2303)  Temperature: 98 2 °F (36 8 °C) (05/20/21 2303)  Temp Source: Temporal (05/20/21 2303)  Respirations: 19 (05/20/21 2303)  Height: 5' 4" (162 6 cm) (05/20/21 2303)  Weight - Scale: 88 9 kg (196 lb) (05/20/21 2303)  SpO2: 96 % (05/20/21 2303)    Current Vitals:   Blood Pressure: 154/88 (05/21/21 0751)  Pulse: 90 (05/21/21 1014)  Temperature: 100 1 °F (37 8 °C) (05/21/21 1014)  Temp Source: Oral (05/21/21 1014)  Respirations: 16 (05/21/21 0751)  Height: 5' 4" (162 6 cm) (05/21/21 0413)  Weight - Scale: 90 9 kg (200 lb 6 4 oz) (05/21/21 0413)  SpO2: 95 % (05/21/21 0808)      Intake/Output Summary (Last 24 hours) at 5/21/2021 1024  Last data filed at 5/21/2021 0900  Gross per 24 hour   Intake 1290 ml   Output --   Net 1290 ml       Invasive Devices     Peripheral Intravenous Line            Peripheral IV 05/20/21 Right Antecubital less than 1 day                Physical Exam  Constitutional:       Appearance: Normal appearance  HENT:      Head: Normocephalic and atraumatic  Nose: Nose normal       Mouth/Throat:      Mouth: Mucous membranes are moist       Pharynx: Oropharynx is clear  Eyes:      Conjunctiva/sclera: Conjunctivae normal       Pupils: Pupils are equal, round, and reactive to light  Neck:      Musculoskeletal: Normal range of motion and neck supple  Cardiovascular:      Rate and Rhythm: Normal rate and regular rhythm  Heart sounds: Normal heart sounds  No murmur  No gallop      Pulmonary:      Effort: Pulmonary effort is normal  Breath sounds: Normal breath sounds  No wheezing, rhonchi or rales  Abdominal:      General: Abdomen is flat  There is no distension  Palpations: Abdomen is soft  There is no mass  Tenderness: There is abdominal tenderness  There is left CVA tenderness  There is no guarding or rebound  Hernia: No hernia is present  Comments: Some mild tenderness to palpation in the suprapubic area, no guarding or rebound tenderness  Musculoskeletal: Normal range of motion  Skin:     General: Skin is warm and dry  Coloration: Skin is not pale  Findings: No erythema or rash  Neurological:      General: No focal deficit present  Mental Status: She is alert  Mental status is at baseline  Psychiatric:         Mood and Affect: Mood normal          Behavior: Behavior normal               Lab Results:   I have personally reviewed pertinent lab results        Urine Microscopic  Order: [id number] - Reflex for Order [id number]  Status:  Final result   Visible to patient:  No (inaccessible in [facility])   Next appt:  None   Ref Range & Units 5/20/21 2322   RBC, UA None Seen, 0-1, 1-2, 2-4, 0-5 /hpf 10-20Abnormal     WBC, UA None Seen, 0-1, 1-2, 0-5, 2-4 /hpf InnumerableAbnormal     Epithelial Cells None Seen, Occasional /hpf Occasional    Bacteria, UA None Seen, Occasional /hpf InnumerableAbnormal     WBC Clumps  Present          Specimen Collected: 05/20/21 23:22   Last Resulted: 05/21/21 00:06           CBC:   Lab Results   Component Value Date    WBC 9 97 05/21/2021    HGB 11 1 (L) 05/21/2021    HCT 35 3 05/21/2021    MCV 91 05/21/2021     05/21/2021    MCH 28 5 05/21/2021    MCHC 31 4 05/21/2021    RDW 13 6 05/21/2021    MPV 10 3 05/21/2021    NRBC 0 05/21/2021   , CMP:   Lab Results   Component Value Date    SODIUM 140 05/21/2021    K 3 7 05/21/2021     05/21/2021    CO2 27 05/21/2021    BUN 13 05/21/2021    CREATININE 1 27 05/21/2021    CALCIUM 8 4 05/21/2021    AST 61 (H) 05/20/2021    ALT 68 05/20/2021    ALKPHOS 163 (H) 05/20/2021    EGFR 46 05/21/2021   , Coagulation:   Lab Results   Component Value Date    INR 0 96 05/20/2021   , Urinalysis:   Lab Results   Component Value Date    COLORU Yellow 05/20/2021    CLARITYU Slightly Cloudy 05/20/2021    SPECGRAV 1 010 05/20/2021    PHUR 6 5 05/20/2021    LEUKOCYTESUR Large (A) 05/20/2021    NITRITE Negative 05/20/2021    GLUCOSEU Negative 05/20/2021    KETONESU Negative 05/20/2021    BILIRUBINUR Negative 05/20/2021    BLOODU Large (A) 05/20/2021     Lactic acid  Order: [id number]  Status:  Final result   Visible to patient:  No (inaccessible in [facility])   Next appt:  None   Ref Range & Units 5/20/21 2325   LACTIC ACID 0 5 - 2 0 mmol/L 0 5                Imaging: I have personally reviewed pertinent films in PACS     CT ABDOMEN AND PELVIS WITHOUT IV CONTRAST     INDICATION:   Fever  Recent lithotripsy      COMPARISON:  4/6/2021      TECHNIQUE:  CT examination of the abdomen and pelvis was performed without intravenous contrast   Axial, sagittal, and coronal 2D reformatted images were created from the source data and submitted for interpretation       Radiation dose length product (DLP) for this visit:  587 mGy-cm   This examination, like all CT scans performed in the [facility], was performed utilizing techniques to minimize radiation dose exposure, including the use of iterative   reconstruction and automated exposure control       Enteric contrast was administered       FINDINGS:     ABDOMEN     LOWER CHEST:  Visualized lung bases are clear      LIVER/BILIARY TREE:  Unremarkable      GALLBLADDER:  No calcified gallstones  No pericholecystic inflammatory change      SPLEEN:  Unremarkable      PANCREAS:  Unremarkable      ADRENAL GLANDS:  Unremarkable      KIDNEYS/URETERS:  Interval placement of left ureteral stent in expected position  No hydronephrosis or hydroureter    Residual calculi within left renal calyces measuring between 3 and 10 mm  Mild left perinephric fat stranding and minimal stranding along the course of the left ureter      Stable right renal 1 5 cm cyst     STOMACH AND BOWEL:  Diverticulosis without evidence of diverticulitis or colitis  No bowel obstruction      APPENDIX:  Normal appendix      ABDOMINOPELVIC CAVITY:  No free intraperitoneal air, fluid collection or lymphadenopathy      VESSELS:  No abdominal aortic aneurysm      PELVIS     REPRODUCTIVE ORGANS:  Prior hysterectomy      URINARY BLADDER:  Punctate focus of gas in the nondependent portion of the bladder likely secondary to catheter manipulation  No bladder calculi      ABDOMINAL WALL/INGUINAL REGIONS:  Unremarkable      OSSEOUS STRUCTURES:  No acute fracture or destructive osseous lesion      IMPRESSION:     1   Left ureteral stent in expected position  No hydronephrosis or hydroureter  Residual calculi in left renal calyces measuring up to 10 mm   2   Mild left perinephric and periureteral inflammatory or congestive change  Pyelonephritis not excluded  EKG, Pathology, and Other Studies: I have personally reviewed pertinent reports  Counseling / Coordination of Care  Total floor / unit time spent today 50 minutes  Greater than 50% of total time was spent with the patient and / or family counseling and / or coordination of care  A description of the counseling / coordination of care:  Review of diagnostic imaging, laboratory studies, review of vital signs, personal examination patient, review of current orders and antibiotic selection, review of operative report from 2 days ago and previous urology telephone conversations documented on the EMR all conducted by this provider in the urological evaluation of the patient at this time  Tiger text messaging with Dr Olinda Santana, urologist, also performed regarding his recommendations for the urological management of the patient at this time while she remains hospitalized  Gabriela Factor, PA-C      Gabriela Factor, PA-C

## 2021-05-21 NOTE — ASSESSMENT & PLAN NOTE
· POA with creatinine 1 41  · Baseline 0 95  · Continue fluid resuscitation  · Trend creatinine  · Treat underlying pyelonephritis  · Urine cultures pending  · Renally dose medications

## 2021-05-21 NOTE — PLAN OF CARE
Problem: PAIN - ADULT  Goal: Verbalizes/displays adequate comfort level or baseline comfort level  Description: Interventions:  - Encourage patient to monitor pain and request assistance  - Assess pain using appropriate pain scale  - Administer analgesics based on type and severity of pain and evaluate response  - Implement non-pharmacological measures as appropriate and evaluate response  - Consider cultural and social influences on pain and pain management  - Notify physician/advanced practitioner if interventions unsuccessful or patient reports new pain  Outcome: Progressing     Problem: INFECTION - ADULT  Goal: Absence or prevention of progression during hospitalization  Description: INTERVENTIONS:  - Assess and monitor for signs and symptoms of infection  - Monitor lab/diagnostic results  - Monitor all insertion sites, i e  indwelling lines, tubes, and drains  - Monitor endotracheal if appropriate and nasal secretions for changes in amount and color  - [place] appropriate cooling/warming therapies per order  - Administer medications as ordered  - Instruct and encourage patient and family to use good hand hygiene technique  - Identify and instruct in appropriate isolation precautions for identified infection/condition  Outcome: Progressing     Problem: SAFETY ADULT  Goal: Patient will remain free of falls  Description: INTERVENTIONS:  - Assess patient frequently for physical needs  -  Identify cognitive and physical deficits and behaviors that affect risk of falls    -  [place] fall precautions as indicated by assessment   - Educate patient/family on patient safety including physical limitations  - Instruct patient to call for assistance with activity based on assessment  - Modify environment to reduce risk of injury  - Consider OT/PT consult to assist with strengthening/mobility  Outcome: Progressing  Goal: Maintain or return to baseline ADL function  Description: INTERVENTIONS:  -  Assess patient's ability to carry out ADLs; assess patient's baseline for ADL function and identify physical deficits which impact ability to perform ADLs (bathing, care of mouth/teeth, toileting, grooming, dressing, etc )  - Assess/evaluate cause of self-care deficits   - Assess range of motion  - Assess patient's mobility; develop plan if impaired  - Assess patient's need for assistive devices and provide as appropriate  - Encourage maximum independence but intervene and supervise when necessary  - Involve family in performance of ADLs  - Assess for home care needs following discharge   - Consider OT consult to assist with ADL evaluation and planning for discharge  - Provide patient education as appropriate  Outcome: Progressing  Goal: Maintain or return mobility status to optimal level  Description: INTERVENTIONS:  - Assess patient's baseline mobility status (ambulation, transfers, stairs, etc )    - Identify cognitive and physical deficits and behaviors that affect mobility  - Identify mobility aids required to assist with transfers and/or ambulation (gait belt, sit-to-stand, lift, walker, cane, etc )  - [place] fall precautions as indicated by assessment  - Record patient progress and toleration of activity level on Mobility SBAR; progress patient to next Phase/Stage  - Instruct patient to call for assistance with activity based on assessment  - Consider rehabilitation consult to assist with strengthening/weightbearing, etc   Outcome: Progressing     Problem: DISCHARGE PLANNING  Goal: Discharge to home or other facility with appropriate resources  Description: INTERVENTIONS:  - Identify barriers to discharge w/patient and caregiver  - Arrange for needed discharge resources and transportation as appropriate  - Identify discharge learning needs (meds, wound care, etc )  - Arrange for interpretive services to assist at discharge as needed  - Refer to Case Management Department for coordinating discharge planning if the patient needs post-hospital services based on physician/advanced practitioner order or complex needs related to functional status, cognitive ability, or social support system  Outcome: Progressing     Problem: Knowledge Deficit  Goal: Patient/family/caregiver demonstrates understanding of disease process, treatment plan, medications, and discharge instructions  Description: Complete learning assessment and assess knowledge base  Interventions:  - Provide teaching at level of understanding  - Provide teaching via preferred learning methods  Outcome: Progressing     Problem: NEUROSENSORY - ADULT  Goal: Achieves maximal functionality and self care  Description: INTERVENTIONS  - Monitor swallowing and airway patency with patient fatigue and changes in neurological status  - Encourage and assist patient to increase activity and self care     - Encourage visually impaired, hearing impaired and aphasic patients to use assistive/communication devices  Outcome: Progressing     Problem: CARDIOVASCULAR - ADULT  Goal: Maintains optimal cardiac output and hemodynamic stability  Description: INTERVENTIONS:  - Monitor I/O, vital signs and rhythm  - Monitor for S/S and trends of decreased cardiac output  - Administer and titrate ordered vasoactive medications to optimize hemodynamic stability  - Assess quality of pulses, skin color and temperature  - Assess for signs of decreased coronary artery perfusion  - Instruct patient to report change in severity of symptoms  Outcome: Progressing     Problem: RESPIRATORY - ADULT  Goal: Achieves optimal ventilation and oxygenation  Description: INTERVENTIONS:  - Assess for changes in respiratory status  - Assess for changes in mentation and behavior  - Position to facilitate oxygenation and minimize respiratory effort  - Oxygen administered by appropriate delivery if ordered  - Initiate smoking cessation education as indicated  - Encourage broncho-pulmonary hygiene including cough, deep breathe, Incentive Spirometry  - Assess the need for suctioning and aspirate as needed  - Assess and instruct to report SOB or any respiratory difficulty  - Respiratory Therapy support as indicated  Outcome: Progressing     Problem: GASTROINTESTINAL - ADULT  Goal: Minimal or absence of nausea and/or vomiting  Description: INTERVENTIONS:  - Administer IV fluids if ordered to ensure adequate hydration  - Maintain NPO status until nausea and vomiting are resolved  - Nasogastric tube if ordered  - Administer ordered antiemetic medications as needed  - Provide nonpharmacologic comfort measures as appropriate  - Advance diet as tolerated, if ordered  - Consider nutrition services referral to assist patient with adequate nutrition and appropriate food choices  Outcome: Progressing  Goal: Maintains or returns to baseline bowel function  Description: INTERVENTIONS:  - Assess bowel function  - Encourage oral fluids to ensure adequate hydration  - Administer IV fluids if ordered to ensure adequate hydration  - Administer ordered medications as needed  - Encourage mobilization and activity  - Consider nutritional services referral to assist patient with adequate nutrition and appropriate food choices  Outcome: Progressing  Goal: Maintains adequate nutritional intake  Description: INTERVENTIONS:  - Monitor percentage of each meal consumed  - Identify factors contributing to decreased intake, treat as appropriate  - Assist with meals as needed  - Monitor I&O, weight, and lab values if indicated  - Obtain nutrition services referral as needed  Outcome: Progressing     Problem: GENITOURINARY - ADULT  Goal: Maintains or returns to baseline urinary function  Description: INTERVENTIONS:  - Assess urinary function  - Encourage oral fluids to ensure adequate hydration if ordered  - Administer IV fluids as ordered to ensure adequate hydration  - Administer ordered medications as needed  - Offer frequent toileting  - Follow urinary retention protocol if ordered  Outcome: Progressing     Problem: METABOLIC, FLUID AND ELECTROLYTES - ADULT  Goal: Electrolytes maintained within normal limits  Description: INTERVENTIONS:  - Monitor labs and assess patient for signs and symptoms of electrolyte imbalances  - Administer electrolyte replacement as ordered  - Monitor response to electrolyte replacements, including repeat lab results as appropriate  - Instruct patient on fluid and nutrition as appropriate  Outcome: Progressing     Problem: SKIN/TISSUE INTEGRITY - ADULT  Goal: Skin integrity remains intact  Description: INTERVENTIONS  - Identify patients at risk for skin breakdown  - Assess and monitor skin integrity  - Assess and monitor nutrition and hydration status  - Monitor labs (i e  albumin)  - Assess for incontinence   - Turn and reposition patient  - Assist with mobility/ambulation  - Relieve pressure over bony prominences  - Avoid friction and shearing  - Provide appropriate hygiene as needed including keeping skin clean and dry  - Evaluate need for skin moisturizer/barrier cream  - Collaborate with interdisciplinary team (i e  Nutrition, Rehabilitation, etc )   - Patient/family teaching  Outcome: Progressing     Problem: HEMATOLOGIC - ADULT  Goal: Maintains hematologic stability  Description: INTERVENTIONS  - Assess for signs and symptoms of bleeding or hemorrhage  - Monitor labs  - Administer supportive blood products/factors as ordered and appropriate  Outcome: Progressing     Problem: MUSCULOSKELETAL - ADULT  Goal: Maintain or return mobility to safest level of function  Description: INTERVENTIONS:  - Assess patient's ability to carry out ADLs; assess patient's baseline for ADL function and identify physical deficits which impact ability to perform ADLs (bathing, care of mouth/teeth, toileting, grooming, dressing, etc )  - Assess/evaluate cause of self-care deficits   - Assess range of motion  - Assess patient's mobility  - Assess patient's need for assistive devices and provide as appropriate  - Encourage maximum independence but intervene and supervise when necessary  - Involve family in performance of ADLs  - Assess for home care needs following discharge   - Consider OT consult to assist with ADL evaluation and planning for discharge  - Provide patient education as appropriate  Outcome: Progressing

## 2021-05-21 NOTE — TELEPHONE ENCOUNTER
Regardin 5 fever   ----- Message from Beauty Dakins sent at 2021 10:01 PM EDT -----  " I had a procedure done yesterday and now I have a fever of 100 5 "

## 2021-05-21 NOTE — H&P
[address]  H&P- Manan Sorianobury 1960, 63 y o  female MRN: [id number]  Unit/Bed#: -01 Encounter: [id number]  Primary Care Provider: Tonio Arreola MD   Date and time admitted to hospital: 5/20/2021 10:58 PM    * Pyelonephritis of left kidney  Assessment & Plan  · POA with recent lithotripsy and left ureteral stent placement on 5/19, treated with Bactrim as outpatient  · Severe left flank pain with associated fever  · Found to have pyuria/bacteriuria with CT abdomen/pelvis revealing left pyelonephritis  · Continue empiric Rocephin  · Urine culture pending, obtain blood cultures now  · Lactic acid normal  · Trend leukocytosis, procalcitonin, fever curve    Prior to accepting admission, I have discussed at length with Surya Beth [facility] covering for urology by majo text- states that patient does not need Urology intervention or consultation, pt should be treated with IV abx per internal medicine discretion      Sepsis with acute renal failure without septic shock (HCC)  Assessment & Plan  · POA with fever at home, leukocytosis, JIN, left pyelonephritis  · Lactic acid normal  · Urine culture pending, obtain blood cultures now  · Continue empiric antibiotic therapy with Rocephin  · Continue hydration  · Trend fever curve, leukocytosis, procalcitonin    History of hyperlipidemia  Assessment & Plan  · Continue Zetia  · Outpatient follow-up    Malignant neoplasm of female breast ([facility] [address] )  Assessment & Plan  · History of left breast cancer 1998  · Outpatient follow-up    Gastroesophageal reflux disease without esophagitis  Assessment & Plan  · Continue PPI with Protonix    Acute kidney injury ([facility] [address] )  Assessment & Plan  · POA with creatinine 1 41  · Baseline 0 95  · Continue fluid resuscitation  · Trend creatinine  · Treat underlying pyelonephritis  · Urine cultures pending  · Renally dose medications    Rheumatoid arthritis ([facility] [address] )  Assessment & Plan  · Outpatient follow-up  · Deonte cardona outpatient, currently on hold due to surgical procedure      VTE Prophylaxis: Pharmacologic VTE Prophylaxis contraindicated due to Hematuria with pyelonephritis  / sequential compression device   Code Status:  Full  POLST: POLST form is not discussed and not completed at this time  Anticipated Length of Stay:  Patient will be admitted on an Inpatient basis with an anticipated length of stay of  > 2 midnights  Justification for Hospital Stay:  Pyelonephritis    Total Time for Visit, including Counseling / Coordination of Care: 30 minutes  Greater than 50% of this total time spent on direct patient counseling and coordination of care  Chief Complaint:   Fever, left flank pain    History of Present Illness:    Ada Nettles is a 63 y o  female who presents with history of renal stones, breast cancer, hyperlipidemia, rheumatoid arthritis  Patient began with symptoms April 8 and was treated for infected left proximal obstructing ureteral calculus with stenting and was found to have a staghorn calculus of the left kidney  Patient then followed up with Urology on May 19th, lithotripsy was performed and ureteral stent was replaced  Patient was discharged on Bactrim but developed fever 100 9° F at home, severe left flank pain with associated difficulty passing her urine and frequency  In the emergency department she was found to have significant pyuria along with pyelonephritis seen on CT scan of abdomen and pelvis  Patient was discussed with Urology coverage by emergency room attending and myself both with recommendations for admission to [facility] with intravenous antibiotic therapy per Internal medicine's discretion with out need for urology intervention or consultation  Review of Systems:    Review of Systems   Constitutional: Positive for fever  Negative for chills  HENT: Negative for ear pain and sore throat  Eyes: Negative for pain and visual disturbance     Respiratory: Negative for cough and shortness of breath  Cardiovascular: Negative for chest pain and palpitations  Gastrointestinal: Positive for abdominal pain  Negative for vomiting  Genitourinary: Positive for difficulty urinating and frequency  Negative for dysuria and hematuria  Musculoskeletal: Positive for back pain  Negative for arthralgias  Skin: Negative for color change and rash  Neurological: Negative for seizures and syncope  All other systems reviewed and are negative  Past Medical and Surgical History:     Past Medical History:   Diagnosis Date    Breast cancer ([facility] )     Pt had in left breast- had radiation and surgery    CPAP (continuous positive airway pressure) dependence     Pt uses nightly    GERD (gastroesophageal reflux disease)     H/O cervical fracture     Hyperlipidemia     Irregular heart beat     Pt is taking Zetia  Pt does not have happen anymore per pt since on medication    Rheumatoid arthritis ([facility] )     Seasonal allergies     Sleep apnea     Wears glasses        Past Surgical History:   Procedure Laterality Date    BLADDER SUSPENSION      BREAST LUMPECTOMY       SECTION      COLONOSCOPY      EGD      FL RETROGRADE PYELOGRAM  2021    HYSTERECTOMY      NM CYSTO/URETERO W/LITHOTRIPSY &INDWELL STENT INSRT Left 2021    Procedure: CYSTOSCOPY URETEROSCOPY WITH LITHOTRIPSY HOLMIUM LASER,  AND INSERTION STENT URETERAL;  Surgeon: Paradise Lechuga MD;  Location: [facility];  Service: Urology    NM CYSTOURETHROSCOPY,URETER CATHETER Left 2021    Procedure: CYSTOSCOPY RETROGRADE PYELOGRAM WITH INSERTION STENT URETERAL;  Surgeon: Escobar Mina MD;  Location: [facility];  Service: Urology    SINUS SURGERY      Deviated septum       Meds/Allergies:    Prior to Admission medications    Medication Sig Start Date End Date Taking?  Authorizing Provider   acetaminophen (Tylenol) 325 mg tablet Take 325 mg by mouth as needed    Yes Historical Provider, MD   Ascorbic Acid (Vitamin C) 500 MG CAPS Take by mouth   Yes Historical Provider, MD   Cholecalciferol 50 MCG (2000 UT) CAPS Take 1 capsule by mouth daily    Yes Historical Provider, MD   cyanocobalamin (VITAMIN B-12) 1000 MCG tablet Take by mouth   Yes Historical Provider, MD   ezetimibe (ZETIA) 10 mg tablet Take 10 mg by mouth daily  11/7/20 11/7/21 Yes Historical Provider, MD   HYDROcodone-acetaminophen ([facility]) 5-325 mg per tablet Take 1-2 tablets by mouth every 6 (six) hours as needed for pain for up to 5 dosesMax Daily Amount: 8 tablets 5/19/21  Yes Paris Acosta MD   leflunomide (ARAVA) 20 MG tablet Take 20 mg by mouth daily   Yes Historical Provider, MD   Magnesium 100 MG CAPS Take by mouth daily    Yes Historical Provider, MD   montelukast (SINGULAIR) 10 mg tablet Take 10 mg by mouth daily   Yes Historical Provider, MD   Multiple Vitamins-Minerals (Multivitamin Gummies Womens) CHEW Chew 1 each daily    Yes Historical Provider, MD   oxybutynin (DITROPAN) 5 mg tablet Take 1 tablet (5 mg total) by mouth 3 (three) times a day as needed (bladder spasm) 5/19/21  Yes Paris Acosta MD   pantoprazole (PROTONIX) 40 mg tablet Take 40 mg by mouth daily 11/23/20  Yes Historical Provider, MD   phenazopyridine (PYRIDIUM) 200 mg tablet Take 1 tablet (200 mg total) by mouth every 8 (eight) hours as needed for bladder spasms 5/19/21  Yes Paris Acosta MD   sulfamethoxazole-trimethoprim (BACTRIM DS) 800-160 mg per tablet Take 1 tablet by mouth every 12 (twelve) hours for 7 days 5/19/21 5/26/21 Yes Paris Acosta MD   tamsulosin [facility]) 0 4 mg Take 1 capsule (0 4 mg total) by mouth daily with dinner 5/20/21 6/19/21 Yes Roosevelt Novak PA-C   vitamin B-12 (VITAMIN B-12) 1,000 mcg tablet Take by mouth daily   Yes Historical Provider, MD   Diclofenac Sodium (VOLTAREN) 1 % diclofenac 1 % topical gel   APPLY 2 GRAMS TO THE AFFECTED AREA(S) BY TOPICAL ROUTE 4 TIMES PER DAY    Historical Provider, MD   fluticasone (FLONASE) 50 mcg/act nasal spray 1 spray into each nostril as needed     Historical Provider, MD     I have reviewed home medications with patient personally  Allergies: Allergies   Allergen Reactions    Azathioprine Swelling     Pt reports face gets "puffy", red and face feels hot   Ciprofloxacin Hives     ? Rash      Amoxicillin-Pot Clavulanate Hives    Penicillins Rash       Social History:     Marital Status: /Civil Union     Patient Pre-hospital Living Situation:  Independent  Patient Pre-hospital Level of Mobility:  Independent  Patient Pre-hospital Diet Restrictions:  None  Substance Use History:   Social History     Substance and Sexual Activity   Alcohol Use Yes    Frequency: Monthly or less    Drinks per session: 1 or 2     Social History     Tobacco Use   Smoking Status Former Smoker    Quit date:     Years since quittin 3   Smokeless Tobacco Never Used     Social History     Substance and Sexual Activity   Drug Use Not Currently       Family History:    Family History   Problem Relation Age of Onset    Lung cancer Father     Heart disease Maternal Aunt     Heart disease Maternal Uncle     Kidney cancer Maternal Grandfather     Lung cancer Maternal Grandfather     Heart attack Brother        Physical Exam:     Vitals:   Blood Pressure: 146/74 (21 0413)  Pulse: 88 (21)  Temperature: 97 8 °F (36 6 °C) (21)  Temp Source: Oral (21)  Respirations: 20 (21)  Height: 5' 4" (162 6 cm) (21)  Weight - Scale: 90 9 kg (200 lb 6 4 oz) (21)  SpO2: 98 % (21)    Physical Exam  Vitals signs and nursing note reviewed  Constitutional:       Appearance: Normal appearance  She is normal weight  She is ill-appearing  HENT:      Head: Normocephalic and atraumatic  Mouth/Throat:      Mouth: Mucous membranes are dry  Eyes:      Conjunctiva/sclera: Conjunctivae normal    Neck:      Musculoskeletal: Normal range of motion and neck supple  Cardiovascular:      Rate and Rhythm: Normal rate and regular rhythm  Pulses: Normal pulses  Heart sounds: Normal heart sounds  Pulmonary:      Effort: Pulmonary effort is normal       Breath sounds: Normal breath sounds  Abdominal:      General: Abdomen is flat  Palpations: Abdomen is soft  Tenderness: There is no abdominal tenderness  There is left CVA tenderness  Musculoskeletal: Normal range of motion  Skin:     General: Skin is warm and dry  Capillary Refill: Capillary refill takes less than 2 seconds  Neurological:      General: No focal deficit present  Mental Status: She is alert and oriented to person, place, and time  Cranial Nerves: No cranial nerve deficit  Sensory: No sensory deficit  Motor: No weakness  Coordination: Coordination normal       Gait: Gait normal    Psychiatric:         Mood and Affect: Mood normal          Behavior: Behavior normal        Additional Data:     Lab Results: I have personally reviewed pertinent reports  Results from last 7 days   Lab Units 05/20/21  2325   WBC Thousand/uL 12 54*   HEMOGLOBIN g/dL 11 0*   HEMATOCRIT % 35 0   PLATELETS Thousands/uL 226   NEUTROS PCT % 78*   LYMPHS PCT % 8*   MONOS PCT % 10   EOS PCT % 2     Results from last 7 days   Lab Units 05/20/21  2325   SODIUM mmol/L 139   POTASSIUM mmol/L 3 4*   CHLORIDE mmol/L 103   CO2 mmol/L 30   BUN mg/dL 17   CREATININE mg/dL 1 41*   ANION GAP mmol/L 6   CALCIUM mg/dL 8 5   ALBUMIN g/dL 3 2*   TOTAL BILIRUBIN mg/dL 0 48   ALK PHOS U/L 163*   ALT U/L 68   AST U/L 61*   GLUCOSE RANDOM mg/dL 108     Results from last 7 days   Lab Units 05/20/21  2325   INR  0 96             Results from last 7 days   Lab Units 05/20/21  2325   LACTIC ACID mmol/L 0 5       Imaging: I have personally reviewed pertinent films in PACS    CT abdomen pelvis wo contrast   Final Result by Gavin Peacock MD (05/21 0015)         1    Left ureteral stent in expected position  No hydronephrosis or hydroureter  Residual calculi in left renal calyces measuring up to 10 mm    2   Mild left perinephric and periureteral inflammatory or congestive change  Pyelonephritis not excluded  Workstation performed: [id number]             EKG, Pathology, and Other Studies Reviewed on Admission:   · All    Allscripts / Epic Records Reviewed: Yes     ** Please Note: This note has been constructed using a voice recognition system   **

## 2021-05-21 NOTE — UTILIZATION REVIEW
Inpatient Admission Authorization Request   NOTIFICATION OF INPATIENT ADMISSION/INPATIENT AUTHORIZATION REQUEST   SERVICING FACILITY:   [address] [place] [address]  Geo Brown [address] [facility], [address]  Tax ID: [id number]  NPI: [id number]  Place of Service: Inpatient [facility]  [address]  Place of Service Code: 24     ATTENDING PROVIDER:  Attending Name and NPI#: Narciso Cheung Md [[id number]]  Address: Geo Brown [address] [place] [address], [facility] [address]one  Phone: [phone]     UTILIZATION REVIEW CONTACT:  [facility] Utilization   Network Utilization Review Department  Phone: [phone]  Fax [phone]  Email: [email or web address]     PHYSICIAN ADVISORY SERVICES:  FOR IUOW-GX-XDIV REVIEW - MEDICAL NECESSITY DENIAL  Phone: [phone]  Fax: [phone]  Email: [email or web address]     TYPE OF REQUEST:  Inpatient Status     ADMISSION INFORMATION:  ADMISSION DATE/TIME: 5/21/21 0137  PATIENT DIAGNOSIS CODE/DESCRIPTION:  Hypokalemia [E87 6]  Urinary tract infection [N39 0]  Pyelonephritis [N12]  Fever [R50 9]  JIN (acute kidney injury) ([facility] [address] ) [N17 9]  Status post placement of ureteral stent [Z96 0]  DISCHARGE DATE/TIME: No discharge date for patient encounter  DISCHARGE DISPOSITION (IF DISCHARGED): Home/Self Care     IMPORTANT INFORMATION:  Please contact the [facility] directly with any questions or concerns regarding this request  Department voicemails are confidential     Send requests for admission clinical reviews, concurrent reviews, approvals, and administrative denials due to lack of clinical to fax [phone]

## 2021-05-21 NOTE — ASSESSMENT & PLAN NOTE
· POA with fever at home, leukocytosis, JIN, left pyelonephritis  · Lactic acid normal  · Urine culture pending, obtain blood cultures now  · Continue empiric antibiotic therapy with Rocephin  · Continue hydration  · Trend fever curve, leukocytosis, procalcitonin

## 2021-05-21 NOTE — TELEPHONE ENCOUNTER
Called patient to get her scheduled for stent removal  Patient currently admitted to [facility]  Will call patient once discharged

## 2021-05-21 NOTE — PLAN OF CARE
Problem: PAIN - ADULT  Goal: Verbalizes/displays adequate comfort level or baseline comfort level  Description: Interventions:  - Encourage patient to monitor pain and request assistance  - Assess pain using appropriate pain scale  - Administer analgesics based on type and severity of pain and evaluate response  - Implement non-pharmacological measures as appropriate and evaluate response  - Consider cultural and social influences on pain and pain management  - Notify physician/advanced practitioner if interventions unsuccessful or patient reports new pain  Outcome: Progressing     Problem: INFECTION - ADULT  Goal: Absence or prevention of progression during hospitalization  Description: INTERVENTIONS:  - Assess and monitor for signs and symptoms of infection  - Monitor lab/diagnostic results  - Monitor all insertion sites, i e  indwelling lines, tubes, and drains  - Monitor endotracheal if appropriate and nasal secretions for changes in amount and color  - Purmela appropriate cooling/warming therapies per order  - Administer medications as ordered  - Instruct and encourage patient and family to use good hand hygiene technique  - Identify and instruct in appropriate isolation precautions for identified infection/condition  Outcome: Progressing  Goal: Absence of fever/infection during neutropenic period  Description: INTERVENTIONS:  - Monitor WBC    Outcome: Progressing     Problem: SAFETY ADULT  Goal: Patient will remain free of falls  Description: INTERVENTIONS:  - Assess patient frequently for physical needs  -  Identify cognitive and physical deficits and behaviors that affect risk of falls    -  Purmela fall precautions as indicated by assessment   - Educate patient/family on patient safety including physical limitations  - Instruct patient to call for assistance with activity based on assessment  - Modify environment to reduce risk of injury  - Consider OT/PT consult to assist with strengthening/mobility  Outcome: Progressing  Goal: Maintain or return to baseline ADL function  Description: INTERVENTIONS:  -  Assess patient's ability to carry out ADLs; assess patient's baseline for ADL function and identify physical deficits which impact ability to perform ADLs (bathing, care of mouth/teeth, toileting, grooming, dressing, etc )  - Assess/evaluate cause of self-care deficits   - Assess range of motion  - Assess patient's mobility; develop plan if impaired  - Assess patient's need for assistive devices and provide as appropriate  - Encourage maximum independence but intervene and supervise when necessary  - Involve family in performance of ADLs  - Assess for home care needs following discharge   - Consider OT consult to assist with ADL evaluation and planning for discharge  - Provide patient education as appropriate  Outcome: Progressing  Goal: Maintain or return mobility status to optimal level  Description: INTERVENTIONS:  - Assess patient's baseline mobility status (ambulation, transfers, stairs, etc )    - Identify cognitive and physical deficits and behaviors that affect mobility  - Identify mobility aids required to assist with transfers and/or ambulation (gait belt, sit-to-stand, lift, walker, cane, etc )  - Elkins fall precautions as indicated by assessment  - Record patient progress and toleration of activity level on Mobility SBAR; progress patient to next Phase/Stage  - Instruct patient to call for assistance with activity based on assessment  - Consider rehabilitation consult to assist with strengthening/weightbearing, etc   Outcome: Progressing     Problem: DISCHARGE PLANNING  Goal: Discharge to home or other facility with appropriate resources  Description: INTERVENTIONS:  - Identify barriers to discharge w/patient and caregiver  - Arrange for needed discharge resources and transportation as appropriate  - Identify discharge learning needs (meds, wound care, etc )  - Arrange for interpretive services to assist at discharge as needed  - Refer to Case Management Department for coordinating discharge planning if the patient needs post-hospital services based on physician/advanced practitioner order or complex needs related to functional status, cognitive ability, or social support system  Outcome: Progressing     Problem: Knowledge Deficit  Goal: Patient/family/caregiver demonstrates understanding of disease process, treatment plan, medications, and discharge instructions  Description: Complete learning assessment and assess knowledge base    Interventions:  - Provide teaching at level of understanding  - Provide teaching via preferred learning methods  Outcome: Progressing     Problem: NEUROSENSORY - ADULT  Goal: Achieves stable or improved neurological status  Description: INTERVENTIONS  - Monitor and report changes in neurological status  - Monitor vital signs such as temperature, blood pressure, glucose, and any other labs ordered   - Initiate measures to prevent increased intracranial pressure  - Monitor for seizure activity and implement precautions if appropriate      Outcome: Progressing  Goal: Remains free of injury related to seizures activity  Description: INTERVENTIONS  - Maintain airway, patient safety  and administer oxygen as ordered  - Monitor patient for seizure activity, document and report duration and description of seizure to physician/advanced practitioner  - If seizure occurs,  ensure patient safety during seizure  - Reorient patient post seizure  - Seizure pads on all 4 side rails  - Instruct patient/family to notify RN of any seizure activity including if an aura is experienced  - Instruct patient/family to call for assistance with activity based on nursing assessment  - Administer anti-seizure medications if ordered    Outcome: Progressing  Goal: Achieves maximal functionality and self care  Description: INTERVENTIONS  - Monitor swallowing and airway patency with patient fatigue and changes in neurological status  - Encourage and assist patient to increase activity and self care     - Encourage visually impaired, hearing impaired and aphasic patients to use assistive/communication devices  Outcome: Progressing     Problem: CARDIOVASCULAR - ADULT  Goal: Maintains optimal cardiac output and hemodynamic stability  Description: INTERVENTIONS:  - Monitor I/O, vital signs and rhythm  - Monitor for S/S and trends of decreased cardiac output  - Administer and titrate ordered vasoactive medications to optimize hemodynamic stability  - Assess quality of pulses, skin color and temperature  - Assess for signs of decreased coronary artery perfusion  - Instruct patient to report change in severity of symptoms  Outcome: Progressing  Goal: Absence of cardiac dysrhythmias or at baseline rhythm  Description: INTERVENTIONS:  - Continuous cardiac monitoring, vital signs, obtain 12 lead EKG if ordered  - Administer antiarrhythmic and heart rate control medications as ordered  - Monitor electrolytes and administer replacement therapy as ordered  Outcome: Progressing     Problem: RESPIRATORY - ADULT  Goal: Achieves optimal ventilation and oxygenation  Description: INTERVENTIONS:  - Assess for changes in respiratory status  - Assess for changes in mentation and behavior  - Position to facilitate oxygenation and minimize respiratory effort  - Oxygen administered by appropriate delivery if ordered  - Initiate smoking cessation education as indicated  - Encourage broncho-pulmonary hygiene including cough, deep breathe, Incentive Spirometry  - Assess the need for suctioning and aspirate as needed  - Assess and instruct to report SOB or any respiratory difficulty  - Respiratory Therapy support as indicated  Outcome: Progressing     Problem: GASTROINTESTINAL - ADULT  Goal: Minimal or absence of nausea and/or vomiting  Description: INTERVENTIONS:  - Administer IV fluids if ordered to ensure adequate hydration  - Maintain NPO status until nausea and vomiting are resolved  - Nasogastric tube if ordered  - Administer ordered antiemetic medications as needed  - Provide nonpharmacologic comfort measures as appropriate  - Advance diet as tolerated, if ordered  - Consider nutrition services referral to assist patient with adequate nutrition and appropriate food choices  Outcome: Progressing  Goal: Maintains or returns to baseline bowel function  Description: INTERVENTIONS:  - Assess bowel function  - Encourage oral fluids to ensure adequate hydration  - Administer IV fluids if ordered to ensure adequate hydration  - Administer ordered medications as needed  - Encourage mobilization and activity  - Consider nutritional services referral to assist patient with adequate nutrition and appropriate food choices  Outcome: Progressing  Goal: Maintains adequate nutritional intake  Description: INTERVENTIONS:  - Monitor percentage of each meal consumed  - Identify factors contributing to decreased intake, treat as appropriate  - Assist with meals as needed  - Monitor I&O, weight, and lab values if indicated  - Obtain nutrition services referral as needed  Outcome: Progressing  Goal: Establish and maintain optimal ostomy function  Description: INTERVENTIONS:  - Assess bowel function  - Encourage oral fluids to ensure adequate hydration  - Administer IV fluids if ordered to ensure adequate hydration   - Administer ordered medications as needed  - Encourage mobilization and activity  - Nutrition services referral to assist patient with appropriate food choices  - Assess stoma site  - Consider wound care consult   Outcome: Progressing     Problem: GENITOURINARY - ADULT  Goal: Maintains or returns to baseline urinary function  Description: INTERVENTIONS:  - Assess urinary function  - Encourage oral fluids to ensure adequate hydration if ordered  - Administer IV fluids as ordered to ensure adequate hydration  - Administer ordered medications as needed  - Offer frequent toileting  - Follow urinary retention protocol if ordered  Outcome: Progressing  Goal: Absence of urinary retention  Description: INTERVENTIONS:  - Assess patients ability to void and empty bladder  - Monitor I/O  - Bladder scan as needed  - Discuss with physician/AP medications to alleviate retention as needed  - Discuss catheterization for long term situations as appropriate  Outcome: Progressing  Goal: Urinary catheter remains patent  Description: INTERVENTIONS:  - Assess patency of urinary catheter  - If patient has a chronic guerrero, consider changing catheter if non-functioning  - Follow guidelines for intermittent irrigation of non-functioning urinary catheter  Outcome: Progressing     Problem: METABOLIC, FLUID AND ELECTROLYTES - ADULT  Goal: Electrolytes maintained within normal limits  Description: INTERVENTIONS:  - Monitor labs and assess patient for signs and symptoms of electrolyte imbalances  - Administer electrolyte replacement as ordered  - Monitor response to electrolyte replacements, including repeat lab results as appropriate  - Instruct patient on fluid and nutrition as appropriate  Outcome: Progressing  Goal: Fluid balance maintained  Description: INTERVENTIONS:  - Monitor labs   - Monitor I/O and WT  - Instruct patient on fluid and nutrition as appropriate  - Assess for signs & symptoms of volume excess or deficit  Outcome: Progressing  Goal: Glucose maintained within target range  Description: INTERVENTIONS:  - Monitor Blood Glucose as ordered  - Assess for signs and symptoms of hyperglycemia and hypoglycemia  - Administer ordered medications to maintain glucose within target range  - Assess nutritional intake and initiate nutrition service referral as needed  Outcome: Progressing     Problem: SKIN/TISSUE INTEGRITY - ADULT  Goal: Skin integrity remains intact  Description: INTERVENTIONS  - Identify patients at risk for skin breakdown  - Assess and monitor skin integrity  - Assess and monitor nutrition and hydration status  - Monitor labs (i e  albumin)  - Assess for incontinence   - Turn and reposition patient  - Assist with mobility/ambulation  - Relieve pressure over bony prominences  - Avoid friction and shearing  - Provide appropriate hygiene as needed including keeping skin clean and dry  - Evaluate need for skin moisturizer/barrier cream  - Collaborate with interdisciplinary team (i e  Nutrition, Rehabilitation, etc )   - Patient/family teaching  Outcome: Progressing  Goal: Incision(s), wounds(s) or drain site(s) healing without S/S of infection  Description: INTERVENTIONS  - Assess and document risk factors for skin impairment   - Assess and document dressing, incision, wound bed, drain sites and surrounding tissue  - Consider nutrition services referral as needed  - Oral mucous membranes remain intact  - Provide patient/ family education  Outcome: Progressing  Goal: Oral mucous membranes remain intact  Description: INTERVENTIONS  - Assess oral mucosa and hygiene practices  - Implement preventative oral hygiene regimen  - Implement oral medicated treatments as ordered  - Initiate Nutrition services referral as needed  Outcome: Progressing     Problem: HEMATOLOGIC - ADULT  Goal: Maintains hematologic stability  Description: INTERVENTIONS  - Assess for signs and symptoms of bleeding or hemorrhage  - Monitor labs  - Administer supportive blood products/factors as ordered and appropriate  Outcome: Progressing     Problem: MUSCULOSKELETAL - ADULT  Goal: Maintain or return mobility to safest level of function  Description: INTERVENTIONS:  - Assess patient's ability to carry out ADLs; assess patient's baseline for ADL function and identify physical deficits which impact ability to perform ADLs (bathing, care of mouth/teeth, toileting, grooming, dressing, etc )  - Assess/evaluate cause of self-care deficits   - Assess range of motion  - Assess patient's mobility  - Assess patient's need for assistive devices and provide as appropriate  - Encourage maximum independence but intervene and supervise when necessary  - Involve family in performance of ADLs  - Assess for home care needs following discharge   - Consider OT consult to assist with ADL evaluation and planning for discharge  - Provide patient education as appropriate  Outcome: Progressing  Goal: Maintain proper alignment of affected body part  Description: INTERVENTIONS:  - Support, maintain and protect limb and body alignment  - Provide patient/ family with appropriate education  Outcome: Progressing

## 2021-05-21 NOTE — ASSESSMENT & PLAN NOTE
· POA with recent lithotripsy and left ureteral stent placement on 5/19, treated with Bactrim as outpatient  · Severe left flank pain with associated fever  · Found to have pyuria/bacteriuria with CT abdomen/pelvis revealing left pyelonephritis  · Continue empiric Rocephin  · Urine culture pending, obtain blood cultures now  · Lactic acid normal  · Trend leukocytosis, procalcitonin, fever curve    Prior to accepting admission, I have discussed at length with Stefan Morgan [facility] covering for urology by majo text- states that patient does not need Urology intervention or consultation, pt should be treated with IV abx per internal medicine discretion

## 2021-05-22 LAB
ANION GAP SERPL CALCULATED.3IONS-SCNC: 9 MMOL/L (ref 4–13)
BACTERIA UR CULT: NORMAL
BUN SERPL-MCNC: 10 MG/DL (ref 5–25)
CALCIUM SERPL-MCNC: 8.2 MG/DL (ref 8.3–10.1)
CHLORIDE SERPL-SCNC: 105 MMOL/L (ref 100–108)
CO2 SERPL-SCNC: 27 MMOL/L (ref 21–32)
CREAT SERPL-MCNC: 0.97 MG/DL (ref 0.6–1.3)
ERYTHROCYTE [DISTWIDTH] IN BLOOD BY AUTOMATED COUNT: 13.7 % (ref 11.6–15.1)
GFR SERPL CREATININE-BSD FRML MDRD: 64 ML/MIN/1.73SQ M
GLUCOSE SERPL-MCNC: 106 MG/DL (ref 65–140)
HCT VFR BLD AUTO: 32.3 % (ref 34.8–46.1)
HGB BLD-MCNC: 10.2 G/DL (ref 11.5–15.4)
MCH RBC QN AUTO: 28.3 PG (ref 26.8–34.3)
MCHC RBC AUTO-ENTMCNC: 31.6 G/DL (ref 31.4–37.4)
MCV RBC AUTO: 90 FL (ref 82–98)
PLATELET # BLD AUTO: 196 THOUSANDS/UL (ref 149–390)
PMV BLD AUTO: 9.8 FL (ref 8.9–12.7)
POTASSIUM SERPL-SCNC: 3.7 MMOL/L (ref 3.5–5.3)
PROCALCITONIN SERPL-MCNC: 0.16 NG/ML
RBC # BLD AUTO: 3.61 MILLION/UL (ref 3.81–5.12)
SODIUM SERPL-SCNC: 141 MMOL/L (ref 136–145)
WBC # BLD AUTO: 8.58 THOUSAND/UL (ref 4.31–10.16)

## 2021-05-22 PROCEDURE — NC001 PR NO CHARGE

## 2021-05-22 PROCEDURE — 84145 PROCALCITONIN (PCT): CPT | Performed by: NURSE PRACTITIONER

## 2021-05-22 PROCEDURE — 80048 BASIC METABOLIC PNL TOTAL CA: CPT | Performed by: FAMILY MEDICINE

## 2021-05-22 PROCEDURE — 99232 SBSQ HOSP IP/OBS MODERATE 35: CPT

## 2021-05-22 PROCEDURE — 99232 SBSQ HOSP IP/OBS MODERATE 35: CPT | Performed by: FAMILY MEDICINE

## 2021-05-22 PROCEDURE — 85027 COMPLETE CBC AUTOMATED: CPT | Performed by: FAMILY MEDICINE

## 2021-05-22 RX ORDER — AMOXICILLIN 250 MG
1 CAPSULE ORAL 2 TIMES DAILY
Status: DISCONTINUED | OUTPATIENT
Start: 2021-05-22 | End: 2021-05-22

## 2021-05-22 RX ORDER — AMOXICILLIN 250 MG
1 CAPSULE ORAL 2 TIMES DAILY
Status: DISCONTINUED | OUTPATIENT
Start: 2021-05-22 | End: 2021-05-23 | Stop reason: HOSPADM

## 2021-05-22 RX ADMIN — CEFTRIAXONE 1000 MG: 1 INJECTION, SOLUTION INTRAVENOUS at 01:21

## 2021-05-22 RX ADMIN — ACETAMINOPHEN 650 MG: 325 TABLET ORAL at 19:30

## 2021-05-22 RX ADMIN — DOCUSATE SODIUM AND SENNOSIDES 1 TABLET: 8.6; 5 TABLET ORAL at 16:42

## 2021-05-22 RX ADMIN — OXYBUTYNIN CHLORIDE 5 MG: 5 TABLET ORAL at 09:04

## 2021-05-22 RX ADMIN — MONTELUKAST 10 MG: 10 TABLET, FILM COATED ORAL at 09:04

## 2021-05-22 RX ADMIN — CYANOCOBALAMIN TAB 500 MCG 1000 MCG: 500 TAB at 09:04

## 2021-05-22 RX ADMIN — EZETIMIBE 10 MG: 10 TABLET ORAL at 09:04

## 2021-05-22 RX ADMIN — OXYBUTYNIN CHLORIDE 5 MG: 5 TABLET ORAL at 21:52

## 2021-05-22 RX ADMIN — CEFTRIAXONE 1000 MG: 1 INJECTION, SOLUTION INTRAVENOUS at 12:36

## 2021-05-22 RX ADMIN — Medication 2000 UNITS: at 09:04

## 2021-05-22 RX ADMIN — SODIUM CHLORIDE 75 ML/HR: 0.9 INJECTION, SOLUTION INTRAVENOUS at 01:20

## 2021-05-22 RX ADMIN — OXYCODONE HYDROCHLORIDE AND ACETAMINOPHEN 500 MG: 500 TABLET ORAL at 09:04

## 2021-05-22 RX ADMIN — ACETAMINOPHEN 650 MG: 325 TABLET ORAL at 01:21

## 2021-05-22 RX ADMIN — MULTIPLE VITAMINS W/ MINERALS TAB 1 TABLET: TAB at 09:04

## 2021-05-22 RX ADMIN — TAMSULOSIN HYDROCHLORIDE 0.4 MG: 0.4 CAPSULE ORAL at 15:41

## 2021-05-22 RX ADMIN — OXYBUTYNIN CHLORIDE 5 MG: 5 TABLET ORAL at 15:41

## 2021-05-22 RX ADMIN — PANTOPRAZOLE SODIUM 40 MG: 40 TABLET, DELAYED RELEASE ORAL at 05:09

## 2021-05-22 NOTE — ASSESSMENT & PLAN NOTE
· POA with fever at home, leukocytosis, JIN, left pyelonephritis  · Lactic acid normal  · Continue antibiotics  Urine culture negative    Await blood culture results

## 2021-05-22 NOTE — ASSESSMENT & PLAN NOTE
· Outpatient follow-up  · Shannon Marcelino as outpatient, currently on hold due to surgical procedure

## 2021-05-22 NOTE — ASSESSMENT & PLAN NOTE
· POA with creatinine 1 41  · Baseline 0 95  · Renal function is back to baseline and hence discontinue IV fluids

## 2021-05-22 NOTE — PLAN OF CARE
Problem: PAIN - ADULT  Goal: Verbalizes/displays adequate comfort level or baseline comfort level  Description: Interventions:  - Encourage patient to monitor pain and request assistance  - Assess pain using appropriate pain scale  - Administer analgesics based on type and severity of pain and evaluate response  - Implement non-pharmacological measures as appropriate and evaluate response  - Consider cultural and social influences on pain and pain management  - Notify physician/advanced practitioner if interventions unsuccessful or patient reports new pain  Outcome: Progressing     Problem: INFECTION - ADULT  Goal: Absence or prevention of progression during hospitalization  Description: INTERVENTIONS:  - Assess and monitor for signs and symptoms of infection  - Monitor lab/diagnostic results  - Monitor all insertion sites, i e  indwelling lines, tubes, and drains  - Monitor endotracheal if appropriate and nasal secretions for changes in amount and color  - [place] appropriate cooling/warming therapies per order  - Administer medications as ordered  - Instruct and encourage patient and family to use good hand hygiene technique  - Identify and instruct in appropriate isolation precautions for identified infection/condition  Outcome: Progressing     Problem: SAFETY ADULT  Goal: Patient will remain free of falls  Description: INTERVENTIONS:  - Assess patient frequently for physical needs  -  Identify cognitive and physical deficits and behaviors that affect risk of falls    -  [place] fall precautions as indicated by assessment   - Educate patient/family on patient safety including physical limitations  - Instruct patient to call for assistance with activity based on assessment  - Modify environment to reduce risk of injury  - Consider OT/PT consult to assist with strengthening/mobility  Outcome: Progressing  Goal: Maintain or return to baseline ADL function  Description: INTERVENTIONS:  -  Assess patient's ability to carry out ADLs; assess patient's baseline for ADL function and identify physical deficits which impact ability to perform ADLs (bathing, care of mouth/teeth, toileting, grooming, dressing, etc )  - Assess/evaluate cause of self-care deficits   - Assess range of motion  - Assess patient's mobility; develop plan if impaired  - Assess patient's need for assistive devices and provide as appropriate  - Encourage maximum independence but intervene and supervise when necessary  - Involve family in performance of ADLs  - Assess for home care needs following discharge   - Consider OT consult to assist with ADL evaluation and planning for discharge  - Provide patient education as appropriate  Outcome: Progressing  Goal: Maintain or return mobility status to optimal level  Description: INTERVENTIONS:  - Assess patient's baseline mobility status (ambulation, transfers, stairs, etc )    - Identify cognitive and physical deficits and behaviors that affect mobility  - Identify mobility aids required to assist with transfers and/or ambulation (gait belt, sit-to-stand, lift, walker, cane, etc )  - [place] fall precautions as indicated by assessment  - Record patient progress and toleration of activity level on Mobility SBAR; progress patient to next Phase/Stage  - Instruct patient to call for assistance with activity based on assessment  - Consider rehabilitation consult to assist with strengthening/weightbearing, etc   Outcome: Progressing     Problem: DISCHARGE PLANNING  Goal: Discharge to home or other facility with appropriate resources  Description: INTERVENTIONS:  - Identify barriers to discharge w/patient and caregiver  - Arrange for needed discharge resources and transportation as appropriate  - Identify discharge learning needs (meds, wound care, etc )  - Arrange for interpretive services to assist at discharge as needed  - Refer to Case Management Department for coordinating discharge planning if the patient needs post-hospital services based on physician/advanced practitioner order or complex needs related to functional status, cognitive ability, or social support system  Outcome: Progressing     Problem: Knowledge Deficit  Goal: Patient/family/caregiver demonstrates understanding of disease process, treatment plan, medications, and discharge instructions  Description: Complete learning assessment and assess knowledge base  Interventions:  - Provide teaching at level of understanding  - Provide teaching via preferred learning methods  Outcome: Progressing     Problem: NEUROSENSORY - ADULT  Goal: Achieves maximal functionality and self care  Description: INTERVENTIONS  - Monitor swallowing and airway patency with patient fatigue and changes in neurological status  - Encourage and assist patient to increase activity and self care     - Encourage visually impaired, hearing impaired and aphasic patients to use assistive/communication devices  Outcome: Progressing     Problem: CARDIOVASCULAR - ADULT  Goal: Maintains optimal cardiac output and hemodynamic stability  Description: INTERVENTIONS:  - Monitor I/O, vital signs and rhythm  - Monitor for S/S and trends of decreased cardiac output  - Administer and titrate ordered vasoactive medications to optimize hemodynamic stability  - Assess quality of pulses, skin color and temperature  - Assess for signs of decreased coronary artery perfusion  - Instruct patient to report change in severity of symptoms  Outcome: Progressing     Problem: RESPIRATORY - ADULT  Goal: Achieves optimal ventilation and oxygenation  Description: INTERVENTIONS:  - Assess for changes in respiratory status  - Assess for changes in mentation and behavior  - Position to facilitate oxygenation and minimize respiratory effort  - Oxygen administered by appropriate delivery if ordered  - Initiate smoking cessation education as indicated  - Encourage broncho-pulmonary hygiene including cough, deep breathe, Incentive Spirometry  - Assess the need for suctioning and aspirate as needed  - Assess and instruct to report SOB or any respiratory difficulty  - Respiratory Therapy support as indicated  Outcome: Progressing     Problem: GASTROINTESTINAL - ADULT  Goal: Minimal or absence of nausea and/or vomiting  Description: INTERVENTIONS:  - Administer IV fluids if ordered to ensure adequate hydration  - Maintain NPO status until nausea and vomiting are resolved  - Nasogastric tube if ordered  - Administer ordered antiemetic medications as needed  - Provide nonpharmacologic comfort measures as appropriate  - Advance diet as tolerated, if ordered  - Consider nutrition services referral to assist patient with adequate nutrition and appropriate food choices  Outcome: Progressing  Goal: Maintains or returns to baseline bowel function  Description: INTERVENTIONS:  - Assess bowel function  - Encourage oral fluids to ensure adequate hydration  - Administer IV fluids if ordered to ensure adequate hydration  - Administer ordered medications as needed  - Encourage mobilization and activity  - Consider nutritional services referral to assist patient with adequate nutrition and appropriate food choices  Outcome: Progressing  Goal: Maintains adequate nutritional intake  Description: INTERVENTIONS:  - Monitor percentage of each meal consumed  - Identify factors contributing to decreased intake, treat as appropriate  - Assist with meals as needed  - Monitor I&O, weight, and lab values if indicated  - Obtain nutrition services referral as needed  Outcome: Progressing     Problem: GENITOURINARY - ADULT  Goal: Maintains or returns to baseline urinary function  Description: INTERVENTIONS:  - Assess urinary function  - Encourage oral fluids to ensure adequate hydration if ordered  - Administer IV fluids as ordered to ensure adequate hydration  - Administer ordered medications as needed  - Offer frequent toileting  - Follow urinary retention protocol if ordered  Outcome: Progressing     Problem: METABOLIC, FLUID AND ELECTROLYTES - ADULT  Goal: Electrolytes maintained within normal limits  Description: INTERVENTIONS:  - Monitor labs and assess patient for signs and symptoms of electrolyte imbalances  - Administer electrolyte replacement as ordered  - Monitor response to electrolyte replacements, including repeat lab results as appropriate  - Instruct patient on fluid and nutrition as appropriate  Outcome: Progressing     Problem: SKIN/TISSUE INTEGRITY - ADULT  Goal: Skin integrity remains intact  Description: INTERVENTIONS  - Identify patients at risk for skin breakdown  - Assess and monitor skin integrity  - Assess and monitor nutrition and hydration status  - Monitor labs (i e  albumin)  - Assess for incontinence   - Turn and reposition patient  - Assist with mobility/ambulation  - Relieve pressure over bony prominences  - Avoid friction and shearing  - Provide appropriate hygiene as needed including keeping skin clean and dry  - Evaluate need for skin moisturizer/barrier cream  - Collaborate with interdisciplinary team (i e  Nutrition, Rehabilitation, etc )   - Patient/family teaching  Outcome: Progressing     Problem: HEMATOLOGIC - ADULT  Goal: Maintains hematologic stability  Description: INTERVENTIONS  - Assess for signs and symptoms of bleeding or hemorrhage  - Monitor labs  - Administer supportive blood products/factors as ordered and appropriate  Outcome: Progressing     Problem: MUSCULOSKELETAL - ADULT  Goal: Maintain or return mobility to safest level of function  Description: INTERVENTIONS:  - Assess patient's ability to carry out ADLs; assess patient's baseline for ADL function and identify physical deficits which impact ability to perform ADLs (bathing, care of mouth/teeth, toileting, grooming, dressing, etc )  - Assess/evaluate cause of self-care deficits   - Assess range of motion  - Assess patient's mobility  - Assess patient's need for assistive devices and provide as appropriate  - Encourage maximum independence but intervene and supervise when necessary  - Involve family in performance of ADLs  - Assess for home care needs following discharge   - Consider OT consult to assist with ADL evaluation and planning for discharge  - Provide patient education as appropriate  Outcome: Progressing     Problem: Potential for Falls  Goal: Patient will remain free of falls  Description: INTERVENTIONS:  - Assess patient frequently for physical needs  -  Identify cognitive and physical deficits and behaviors that affect risk of falls    -  [place] fall precautions as indicated by assessment   - Educate patient/family on patient safety including physical limitations  - Instruct patient to call for assistance with activity based on assessment  - Modify environment to reduce risk of injury  - Consider OT/PT consult to assist with strengthening/mobility  Outcome: Progressing

## 2021-05-22 NOTE — PROGRESS NOTES
[address]  Progress Note - [facility] 1960, 63 y o  female MRN: [id number]  Unit/Bed#: -01 Encounter: [id number]  Primary Care Provider: Finesse Cummins MD   Date and time admitted to hospital: 5/20/2021 10:58 PM    * Pyelonephritis of left kidney  Assessment & Plan  · POA with recent lithotripsy and left ureteral stent placement on 5/19, treated with Bactrim as outpatient  · Severe left flank pain with associated fever  · Found to have pyuria/bacteriuria with CT abdomen/pelvis revealing left pyelonephritis  · Continue empiric Rocephin  · Urine culture neg and blood cultures pending  Patient is already on antibiotics and hence urine culture may be falsely negative  · Lactic acid normal  · Again had fever of 102 this morning  · Appreciate urology input  Follow up with them outpatient    Acute kidney injury [facility])  Assessment & Plan  · POA with creatinine 1 41  · Baseline 0 95  · Renal function is back to baseline and hence discontinue IV fluids    Sepsis with acute renal failure without septic shock (HCC)  Assessment & Plan  · POA with fever at home, leukocytosis, JIN, left pyelonephritis  · Lactic acid normal  · Continue antibiotics  Urine culture negative  Await blood culture results    History of hyperlipidemia  Assessment & Plan  · Continue Zetia  · Outpatient follow-up    Malignant neoplasm of female breast [facility])  Assessment & Plan  · History of left breast cancer 1998  · Outpatient follow-up    Gastroesophageal reflux disease without esophagitis  Assessment & Plan  · Continue PPI with Protonix  stable    Rheumatoid arthritis ([facility] [address] )  Assessment & Plan  · Outpatient follow-up  · [address] as outpatient, currently on hold due to surgical procedure      VTE Pharmacologic Prophylaxis:   Pharmacologic: Pharmacologic VTE Prophylaxis contraindicated due to Encourage ambulation  Mechanical VTE Prophylaxis in Place: Yes    Patient Centered Rounds: I have performed bedside rounds with nursing staff today  Discussions with Specialists or Other Care Team Provider:  None    Education and Discussions with Family / Patient:  Discussed with patient bedside    Time Spent for Care: 30 minutes  More than 50% of total time spent on counseling and coordination of care as described above  Current Length of Stay: 1 day(s)    Current Patient Status: Inpatient   Certification Statement: The patient will continue to require additional inpatient hospital stay due to Acute left-sided pyelonephritis    Discharge Plan:  Pending progress    Code Status: Level 1 - Full Code      Subjective:   Patient states that she is feeling a little bit better now  She still has left-sided groin pain but her flank pain is improving  Had a fever of 102 again today  Dysuria is improving    Objective:     Vitals:   Temp (24hrs), Av 9 °F (37 7 °C), Min:97 7 °F (36 5 °C), Max:102 °F (38 9 °C)    Temp:  [97 7 °F (36 5 °C)-102 °F (38 9 °C)] 97 7 °F (36 5 °C)  HR:  [] 89  Resp:  [12-18] 18  BP: (132-143)/(73-80) 137/80  SpO2:  [95 %-96 %] 96 %  Body mass index is 34 4 kg/m²  Input and Output Summary (last 24 hours): Intake/Output Summary (Last 24 hours) at 2021 1109  Last data filed at 2021 0934  Gross per 24 hour   Intake 2580 ml   Output 5700 ml   Net -3120 ml       Physical Exam:     Physical Exam  Vitals signs and nursing note reviewed  Constitutional:       Appearance: Normal appearance  HENT:      Head: Normocephalic and atraumatic  Right Ear: External ear normal       Left Ear: External ear normal       Nose: Nose normal       Mouth/Throat:      Pharynx: Oropharynx is clear  Neck:      Musculoskeletal: Normal range of motion and neck supple  Cardiovascular:      Rate and Rhythm: Normal rate and regular rhythm  Heart sounds: Normal heart sounds  Pulmonary:      Effort: Pulmonary effort is normal       Breath sounds: Normal breath sounds     Abdominal: General: Bowel sounds are normal       Palpations: Abdomen is soft  Tenderness: There is abdominal tenderness  Musculoskeletal: Normal range of motion  Skin:     General: Skin is warm and dry  Capillary Refill: Capillary refill takes less than 2 seconds  Neurological:      General: No focal deficit present  Mental Status: She is alert and oriented to person, place, and time  Psychiatric:         Mood and Affect: Mood normal            Additional Data:     Labs:    Results from last 7 days   Lab Units 05/22/21  0511 05/21/21  0535   WBC Thousand/uL 8 58 9 97   HEMOGLOBIN g/dL 10 2* 11 1*   HEMATOCRIT % 32 3* 35 3   PLATELETS Thousands/uL 196 202   NEUTROS PCT %  --  80*   LYMPHS PCT %  --  7*   MONOS PCT %  --  9   EOS PCT %  --  2     Results from last 7 days   Lab Units 05/22/21  0512  05/20/21  2325   SODIUM mmol/L 141   < > 139   POTASSIUM mmol/L 3 7   < > 3 4*   CHLORIDE mmol/L 105   < > 103   CO2 mmol/L 27   < > 30   BUN mg/dL 10   < > 17   CREATININE mg/dL 0 97   < > 1 41*   ANION GAP mmol/L 9   < > 6   CALCIUM mg/dL 8 2*   < > 8 5   ALBUMIN g/dL  --   --  3 2*   TOTAL BILIRUBIN mg/dL  --   --  0 48   ALK PHOS U/L  --   --  163*   ALT U/L  --   --  68   AST U/L  --   --  61*   GLUCOSE RANDOM mg/dL 106   < > 108    < > = values in this interval not displayed  Results from last 7 days   Lab Units 05/20/21  2325   INR  0 96             Results from last 7 days   Lab Units 05/21/21  0535 05/20/21  2325   LACTIC ACID mmol/L  --  0 5   PROCALCITONIN ng/ml 0 26*  --            * I Have Reviewed All Lab Data Listed Above  * Additional Pertinent Lab Tests Reviewed: [address] 68 Admission Reviewed    Imaging:    Imaging Reports Reviewed Today Include:  None  Imaging Personally Reviewed by Myself Includes:  None    Recent Cultures (last 7 days):     Results from last 7 days   Lab Units 05/21/21  0537 05/21/21  0536 05/20/21  2322   BLOOD CULTURE  No Growth at 24 hrs   No Growth at 24 hrs   --    URINE CULTURE   --   --  No Growth <1000 cfu/mL       Last 24 Hours Medication List:   Current Facility-Administered Medications   Medication Dose Route Frequency Provider Last Rate    acetaminophen  650 mg Oral Q4H PRN Manuela S Robert, CRNP      ascorbic acid  500 mg Oral Daily Manuela S Robert, CRNP      cefTRIAXone  1,000 mg Intravenous Q12H Yanira Mason MD 1,000 mg (05/22/21 0121)    cholecalciferol  2,000 Units Oral Daily Manuela S Robert, CRNP      vitamin B-12  1,000 mcg Oral Daily Manuela S Robert, CRNP      ezetimibe  10 mg Oral Daily Manuela S Robert, CRNP      montelukast  10 mg Oral Daily Manuela S Robert, CRNP      multivitamin-minerals  1 tablet Oral Daily Manuela S Robert, CRNP      ondansetron  4 mg Intravenous Q6H PRN Manuela S Robert, CRNP      oxybutynin  5 mg Oral TID Renny Connelly PA-C      pantoprazole  40 mg Oral Early Morning Manuela S Robert, CRNP      tamsulosin  0 4 mg Oral Daily With Dinner Manuela S Robert, CRNP          Today, Patient Was Seen By: Yanira Mason MD    ** Please Note: Dictation voice to text software may have been used in the creation of this document   **

## 2021-05-23 VITALS
HEIGHT: 64 IN | TEMPERATURE: 98.6 F | BODY MASS INDEX: 34.21 KG/M2 | SYSTOLIC BLOOD PRESSURE: 125 MMHG | OXYGEN SATURATION: 96 % | RESPIRATION RATE: 20 BRPM | WEIGHT: 200.4 LBS | DIASTOLIC BLOOD PRESSURE: 73 MMHG | HEART RATE: 83 BPM

## 2021-05-23 PROCEDURE — 99239 HOSP IP/OBS DSCHRG MGMT >30: CPT | Performed by: FAMILY MEDICINE

## 2021-05-23 RX ORDER — SACCHAROMYCES BOULARDII 250 MG
250 CAPSULE ORAL 2 TIMES DAILY
Qty: 20 CAPSULE | Refills: 0 | Status: SHIPPED | OUTPATIENT
Start: 2021-05-23 | End: 2021-06-02

## 2021-05-23 RX ORDER — CEPHALEXIN 500 MG/1
500 CAPSULE ORAL EVERY 6 HOURS SCHEDULED
Qty: 28 CAPSULE | Refills: 0 | Status: SHIPPED | OUTPATIENT
Start: 2021-05-23 | End: 2021-05-30

## 2021-05-23 RX ORDER — ACETAMINOPHEN 325 MG/1
650 TABLET ORAL EVERY 6 HOURS PRN
Refills: 0
Start: 2021-05-23

## 2021-05-23 RX ADMIN — DOCUSATE SODIUM AND SENNOSIDES 1 TABLET: 8.6; 5 TABLET ORAL at 08:28

## 2021-05-23 RX ADMIN — CEFTRIAXONE 1000 MG: 1 INJECTION, SOLUTION INTRAVENOUS at 01:02

## 2021-05-23 RX ADMIN — PANTOPRAZOLE SODIUM 40 MG: 40 TABLET, DELAYED RELEASE ORAL at 06:06

## 2021-05-23 RX ADMIN — OXYCODONE HYDROCHLORIDE AND ACETAMINOPHEN 500 MG: 500 TABLET ORAL at 08:28

## 2021-05-23 RX ADMIN — CEFTRIAXONE 1000 MG: 1 INJECTION, SOLUTION INTRAVENOUS at 12:27

## 2021-05-23 RX ADMIN — EZETIMIBE 10 MG: 10 TABLET ORAL at 08:28

## 2021-05-23 RX ADMIN — MONTELUKAST 10 MG: 10 TABLET, FILM COATED ORAL at 08:28

## 2021-05-23 RX ADMIN — Medication 2000 UNITS: at 08:28

## 2021-05-23 RX ADMIN — OXYBUTYNIN CHLORIDE 5 MG: 5 TABLET ORAL at 08:28

## 2021-05-23 RX ADMIN — MULTIPLE VITAMINS W/ MINERALS TAB 1 TABLET: TAB at 08:28

## 2021-05-23 RX ADMIN — CYANOCOBALAMIN TAB 500 MCG 1000 MCG: 500 TAB at 08:28

## 2021-05-23 NOTE — DISCHARGE INSTR - AVS FIRST PAGE
Start antibiotics from tomorrow and please complete full 7 day course    May use any over-the-counter probiotics or Florastor for at least 10 days  Please follow up outpatient with Urology in the next 1-2 weeks

## 2021-05-23 NOTE — PLAN OF CARE
Problem: PAIN - ADULT  Goal: Verbalizes/displays adequate comfort level or baseline comfort level  Description: Interventions:  - Encourage patient to monitor pain and request assistance  - Assess pain using appropriate pain scale  - Administer analgesics based on type and severity of pain and evaluate response  - Implement non-pharmacological measures as appropriate and evaluate response  - Consider cultural and social influences on pain and pain management  - Notify physician/advanced practitioner if interventions unsuccessful or patient reports new pain  Outcome: Progressing     Problem: INFECTION - ADULT  Goal: Absence or prevention of progression during hospitalization  Description: INTERVENTIONS:  - Assess and monitor for signs and symptoms of infection  - Monitor lab/diagnostic results  - Monitor all insertion sites, i e  indwelling lines, tubes, and drains  - Monitor endotracheal if appropriate and nasal secretions for changes in amount and color  - [place] appropriate cooling/warming therapies per order  - Administer medications as ordered  - Instruct and encourage patient and family to use good hand hygiene technique  - Identify and instruct in appropriate isolation precautions for identified infection/condition  Outcome: Progressing     Problem: SAFETY ADULT  Goal: Patient will remain free of falls  Description: INTERVENTIONS:  - Assess patient frequently for physical needs  -  Identify cognitive and physical deficits and behaviors that affect risk of falls    -  [place] fall precautions as indicated by assessment   - Educate patient/family on patient safety including physical limitations  - Instruct patient to call for assistance with activity based on assessment  - Modify environment to reduce risk of injury  - Consider OT/PT consult to assist with strengthening/mobility  Outcome: Progressing  Goal: Maintain or return to baseline ADL function  Description: INTERVENTIONS:  -  Assess patient's ability to carry out ADLs; assess patient's baseline for ADL function and identify physical deficits which impact ability to perform ADLs (bathing, care of mouth/teeth, toileting, grooming, dressing, etc )  - Assess/evaluate cause of self-care deficits   - Assess range of motion  - Assess patient's mobility; develop plan if impaired  - Assess patient's need for assistive devices and provide as appropriate  - Encourage maximum independence but intervene and supervise when necessary  - Involve family in performance of ADLs  - Assess for home care needs following discharge   - Consider OT consult to assist with ADL evaluation and planning for discharge  - Provide patient education as appropriate  Outcome: Progressing  Goal: Maintain or return mobility status to optimal level  Description: INTERVENTIONS:  - Assess patient's baseline mobility status (ambulation, transfers, stairs, etc )    - Identify cognitive and physical deficits and behaviors that affect mobility  - Identify mobility aids required to assist with transfers and/or ambulation (gait belt, sit-to-stand, lift, walker, cane, etc )  - [place] fall precautions as indicated by assessment  - Record patient progress and toleration of activity level on Mobility SBAR; progress patient to next Phase/Stage  - Instruct patient to call for assistance with activity based on assessment  - Consider rehabilitation consult to assist with strengthening/weightbearing, etc   Outcome: Progressing     Problem: DISCHARGE PLANNING  Goal: Discharge to home or other facility with appropriate resources  Description: INTERVENTIONS:  - Identify barriers to discharge w/patient and caregiver  - Arrange for needed discharge resources and transportation as appropriate  - Identify discharge learning needs (meds, wound care, etc )  - Arrange for interpretive services to assist at discharge as needed  - Refer to Case Management Department for coordinating discharge planning if the patient needs post-hospital services based on physician/advanced practitioner order or complex needs related to functional status, cognitive ability, or social support system  Outcome: Progressing     Problem: Knowledge Deficit  Goal: Patient/family/caregiver demonstrates understanding of disease process, treatment plan, medications, and discharge instructions  Description: Complete learning assessment and assess knowledge base  Interventions:  - Provide teaching at level of understanding  - Provide teaching via preferred learning methods  Outcome: Progressing     Problem: NEUROSENSORY - ADULT  Goal: Achieves maximal functionality and self care  Description: INTERVENTIONS  - Monitor swallowing and airway patency with patient fatigue and changes in neurological status  - Encourage and assist patient to increase activity and self care     - Encourage visually impaired, hearing impaired and aphasic patients to use assistive/communication devices  Outcome: Progressing     Problem: CARDIOVASCULAR - ADULT  Goal: Maintains optimal cardiac output and hemodynamic stability  Description: INTERVENTIONS:  - Monitor I/O, vital signs and rhythm  - Monitor for S/S and trends of decreased cardiac output  - Administer and titrate ordered vasoactive medications to optimize hemodynamic stability  - Assess quality of pulses, skin color and temperature  - Assess for signs of decreased coronary artery perfusion  - Instruct patient to report change in severity of symptoms  Outcome: Progressing     Problem: RESPIRATORY - ADULT  Goal: Achieves optimal ventilation and oxygenation  Description: INTERVENTIONS:  - Assess for changes in respiratory status  - Assess for changes in mentation and behavior  - Position to facilitate oxygenation and minimize respiratory effort  - Oxygen administered by appropriate delivery if ordered  - Initiate smoking cessation education as indicated  - Encourage broncho-pulmonary hygiene including cough, deep breathe, Incentive Spirometry  - Assess the need for suctioning and aspirate as needed  - Assess and instruct to report SOB or any respiratory difficulty  - Respiratory Therapy support as indicated  Outcome: Progressing     Problem: GASTROINTESTINAL - ADULT  Goal: Minimal or absence of nausea and/or vomiting  Description: INTERVENTIONS:  - Administer IV fluids if ordered to ensure adequate hydration  - Maintain NPO status until nausea and vomiting are resolved  - Nasogastric tube if ordered  - Administer ordered antiemetic medications as needed  - Provide nonpharmacologic comfort measures as appropriate  - Advance diet as tolerated, if ordered  - Consider nutrition services referral to assist patient with adequate nutrition and appropriate food choices  Outcome: Progressing  Goal: Maintains or returns to baseline bowel function  Description: INTERVENTIONS:  - Assess bowel function  - Encourage oral fluids to ensure adequate hydration  - Administer IV fluids if ordered to ensure adequate hydration  - Administer ordered medications as needed  - Encourage mobilization and activity  - Consider nutritional services referral to assist patient with adequate nutrition and appropriate food choices  Outcome: Progressing  Goal: Maintains adequate nutritional intake  Description: INTERVENTIONS:  - Monitor percentage of each meal consumed  - Identify factors contributing to decreased intake, treat as appropriate  - Assist with meals as needed  - Monitor I&O, weight, and lab values if indicated  - Obtain nutrition services referral as needed  Outcome: Progressing     Problem: GENITOURINARY - ADULT  Goal: Maintains or returns to baseline urinary function  Description: INTERVENTIONS:  - Assess urinary function  - Encourage oral fluids to ensure adequate hydration if ordered  - Administer IV fluids as ordered to ensure adequate hydration  - Administer ordered medications as needed  - Offer frequent toileting  - Follow urinary retention protocol if ordered  Outcome: Progressing     Problem: METABOLIC, FLUID AND ELECTROLYTES - ADULT  Goal: Electrolytes maintained within normal limits  Description: INTERVENTIONS:  - Monitor labs and assess patient for signs and symptoms of electrolyte imbalances  - Administer electrolyte replacement as ordered  - Monitor response to electrolyte replacements, including repeat lab results as appropriate  - Instruct patient on fluid and nutrition as appropriate  Outcome: Progressing     Problem: SKIN/TISSUE INTEGRITY - ADULT  Goal: Skin integrity remains intact  Description: INTERVENTIONS  - Identify patients at risk for skin breakdown  - Assess and monitor skin integrity  - Assess and monitor nutrition and hydration status  - Monitor labs (i e  albumin)  - Assess for incontinence   - Turn and reposition patient  - Assist with mobility/ambulation  - Relieve pressure over bony prominences  - Avoid friction and shearing  - Provide appropriate hygiene as needed including keeping skin clean and dry  - Evaluate need for skin moisturizer/barrier cream  - Collaborate with interdisciplinary team (i e  Nutrition, Rehabilitation, etc )   - Patient/family teaching  Outcome: Progressing     Problem: HEMATOLOGIC - ADULT  Goal: Maintains hematologic stability  Description: INTERVENTIONS  - Assess for signs and symptoms of bleeding or hemorrhage  - Monitor labs  - Administer supportive blood products/factors as ordered and appropriate  Outcome: Progressing     Problem: MUSCULOSKELETAL - ADULT  Goal: Maintain or return mobility to safest level of function  Description: INTERVENTIONS:  - Assess patient's ability to carry out ADLs; assess patient's baseline for ADL function and identify physical deficits which impact ability to perform ADLs (bathing, care of mouth/teeth, toileting, grooming, dressing, etc )  - Assess/evaluate cause of self-care deficits   - Assess range of motion  - Assess patient's mobility  - Assess patient's need for assistive devices and provide as appropriate  - Encourage maximum independence but intervene and supervise when necessary  - Involve family in performance of ADLs  - Assess for home care needs following discharge   - Consider OT consult to assist with ADL evaluation and planning for discharge  - Provide patient education as appropriate  Outcome: Progressing     Problem: Potential for Falls  Goal: Patient will remain free of falls  Description: INTERVENTIONS:  - Assess patient frequently for physical needs  -  Identify cognitive and physical deficits and behaviors that affect risk of falls    -  [place] fall precautions as indicated by assessment   - Educate patient/family on patient safety including physical limitations  - Instruct patient to call for assistance with activity based on assessment  - Modify environment to reduce risk of injury  - Consider OT/PT consult to assist with strengthening/mobility  Outcome: Progressing

## 2021-05-23 NOTE — UTILIZATION REVIEW
Initial Clinical Review    Admission: Date/Time/Statement:   Admission Orders (From admission, onward)     Ordered        05/21/21 0137  Inpatient Admission  Once                   Orders Placed This Encounter   Procedures    Inpatient Admission     Standing Status:   Standing     Number of Occurrences:   1     Order Specific Question:   Level of Care     Answer:   Med Surg [16]     Order Specific Question:   Estimated length of stay     Answer:   More than 2 Midnights     Order Specific Question:   Certification     Answer:   I certify that inpatient services are medically necessary for this patient for a duration of greater than two midnights  See H&P and MD Progress Notes for additional information about the patient's course of treatment  ED Arrival Information     Expected Arrival Acuity Means of Arrival Escorted By Service Admission Type    5/20/2021 5/20/2021 22:57 Urgent Walk-In Spouse Hospitalist Urgent    Arrival Complaint    Post-op Problem/fever        Chief Complaint   Patient presents with    Fever - 9 weeks to 74 years     Pt reports having the kidney stones blasted in her left kidney yesterday, started with a low grade fevers today, tmax 100 9 today, took tylenol @ 2130  Called On call triage line who spoke with oncall urologist who stated she should come to the ED for eval      Initial Presentation:   63y Female to ED presents with Fever and  left flank pain  Symptoms started on April 8th treated for infected left proximal obstructing ureteral calculus with stenting and was found to have a staghorn calculus of the left kidney  Urology f/u on May 19th, Lithotripsy and ureteral stent replaced, d/c on Bactrim  At home pt started with fever of 100 9, severe left flank pain with associated difficulty passing her urine and frequency  In ED found with significant pyuria along with CT scan of abdomen and pelvis showing pyelonephritis     PMH for Renal stones, breast cancer, hyperlipidemia, rheumatoid arthritis and GERD  Admit Inpatient level of care for Pyelonephritis of left kidney, Sepsis with ARF and JIN  Recent lithotripsy and left ureteral stent placement on 5/19, treated with Bactrim as outpatient  Iv antibiotics  Bld and Urine culture pending  Trend leukocytosis, procalcitonin, fever curve  Baseline creat 0 95, 1 41 on admit  Continue IVFs  Trend creat  Renally dose meds  On exam; left CVA tenderness  5/21 Urology cons; Pyelonephritis left kidney  POD day #2 S/p cysto, left ureteroscopy Holmium laser of staghorn calculus, insertion left ureteral stent  History of 6 mm proximal left ureteral stone stone with recent urinary tract infection completed full course of Bactrim, she had a partial staghorn calculus of left upper pole  Creat 1 4 on admit, repeat 1 27 this am  Temp 102 on admit, most recent 100 1  No indication for any need to reposition or remove left ureteral stent  Continue Iv antibiotics and urine culture pending  Scheduled for stent removal in the office in 2 weeks  Continue the patient on Flomax daily  Oxybutynin ordered  Hold off on continuing pyridium  Repeat labs in am 5/22  Date: 5/22  Day 2:   Progress notes; Continue Iv antibiotics  Continue the patient on Flomax daily  Fever of 102 this am again  Continue Zetia  Still with persistent left-sided groin pain but flank pain improving  Persistent fever but dysuria ijproving       ED Triage Vitals [05/20/21 2303]   Temperature Pulse Respirations Blood Pressure SpO2   98 2 °F (36 8 °C) 83 19 142/62 96 %      Temp Source Heart Rate Source Patient Position - Orthostatic VS BP Location FiO2 (%)   Temporal Monitor Lying Left arm --      Pain Score       3          Wt Readings from Last 1 Encounters:   05/21/21 90 9 kg (200 lb 6 4 oz)     Additional Vital Signs:   05/22/21 18:45:57  101 3 °F (38 5 °C)Abnormal   97  --  --  --  95 %  --  --   05/22/21 14:21:55  98 8 °F (37 1 °C)  97  17  124/79  94  96 %  --  Sitting   05/22/21 05:10:16  97 9 °F (36 6 °C)  77  --  --  --  95 %  --  --   05/22/21 00:03:02  102 °F (38 9 °C)Abnormal   --  12  143/80  101  --  --  --   05/21/21 2053  98 8 °F (37 1 °C)  --  --  --  --  --  --  --   05/21/21 2030  --  --  --  --  --  95 %  None (Room air)  --   05/21/21 1753  101 4 °F (38 6 °C)Abnormal   99  --  --  --  --  --  --   05/21/21 1656  101 9 °F (38 8 °C)Abnormal   --  --  --  --  --  --  --   05/21/21 15:17:16  100 5 °F (38 1 °C)  84  --  132/73  93  96 %  --  --   05/21/21 11:41:56  99 2 °F (37 3 °C)  104  --  --  --  95 %  --  --   05/21/21 10:59:56  99 5 °F (37 5 °C)  84  --  --  --  94 %  --  --   05/21/21 1014  100 1 °F (37 8 °C)  90  --  --  --  --  --  --   05/21/21 0859  100 6 °F (38 1 °C)Abnormal   --  --  --  --  --  --  --   05/21/21 0808  --  --  --  --  --  95 %  None (Room air)       05/21/21 07:51:28  102 °F (38 9 °C)Abnormal   108Abnormal   16  154/88  110  93 %      Pertinent Labs/Diagnostic Test Results:   5/20 CT abd/pelvis -  Left ureteral stent in expected position  No hydronephrosis or hydroureter  Residual calculi in left renal calyces measuring up to 10 mm  Mild left perinephric and periureteral inflammatory or congestive change    Pyelonephritis not excluded      Results from last 7 days   Lab Units 05/22/21  0511 05/21/21  0535 05/20/21  2325   WBC Thousand/uL 8 58 9 97 12 54*   HEMOGLOBIN g/dL 10 2* 11 1* 11 0*   HEMATOCRIT % 32 3* 35 3 35 0   PLATELETS Thousands/uL 196 202 226   NEUTROS ABS Thousands/µL  --  8 03* 9 87*         Results from last 7 days   Lab Units 05/22/21  0512 05/21/21  0535 05/20/21  2325   SODIUM mmol/L 141 140 139   POTASSIUM mmol/L 3 7 3 7 3 4*   CHLORIDE mmol/L 105 107 103   CO2 mmol/L 27 27 30   ANION GAP mmol/L 9 6 6   BUN mg/dL 10 13 17   CREATININE mg/dL 0 97 1 27 1 41*   EGFR ml/min/1 73sq m 64 46 41   CALCIUM mg/dL 8 2* 8 4 8 5   MAGNESIUM mg/dL  --  2 2  --      Results from last 7 days   Lab Units 05/20/21  2325   AST U/L 61*   ALT U/L 68   ALK PHOS U/L 163*   TOTAL PROTEIN g/dL 7 2   ALBUMIN g/dL 3 2*   TOTAL BILIRUBIN mg/dL 0 48         Results from last 7 days   Lab Units 05/22/21  0512 05/21/21  0535 05/20/21  2325   GLUCOSE RANDOM mg/dL 106 109 108       Results from last 7 days   Lab Units 05/20/21  2325   PROTIME seconds 12 6   INR  0 96   PTT seconds 31         Results from last 7 days   Lab Units 05/22/21  0511 05/21/21  0535   PROCALCITONIN ng/ml 0 16 0 26*     Results from last 7 days   Lab Units 05/20/21  2325   LACTIC ACID mmol/L 0 5       Results from last 7 days   Lab Units 05/20/21  2325   LIPASE u/L 90             Results from last 7 days   Lab Units 05/20/21  2322   CLARITY UA  Slightly Cloudy   COLOR UA  Yellow   SPEC GRAV UA  1 010   PH UA  6 5   GLUCOSE UA mg/dl Negative   KETONES UA mg/dl Negative   BLOOD UA  Large*   PROTEIN UA mg/dl 100 (2+)*   NITRITE UA  Negative   BILIRUBIN UA  Negative   UROBILINOGEN UA E U /dl 0 2   LEUKOCYTES UA  Large*   WBC UA /hpf Innumerable*   RBC UA /hpf 10-20*   BACTERIA UA /hpf Innumerable*   EPITHELIAL CELLS WET PREP /hpf Occasional       Results from last 7 days   Lab Units 05/21/21  0537 05/21/21  0536 05/20/21  2322   BLOOD CULTURE  No Growth at 48 hrs  No Growth at 48 hrs    --    URINE CULTURE   --   --  No Growth <1000 cfu/mL       ED Treatment:   Medication Administration from 05/20/2021 2226 to 05/21/2021 0402       Date/Time Order Dose Route Action     05/20/2021 2326 sodium chloride 0 9 % bolus 1,000 mL 1,000 mL Intravenous New Bag     05/21/2021 0102 sodium chloride 0 9 % infusion 125 mL/hr Intravenous New Bag     05/21/2021 0101 cefTRIAXone (ROCEPHIN) IVPB (premix in dextrose) 1,000 mg 50 mL 1,000 mg Intravenous New Bag        Past Medical History:   Diagnosis Date    Breast cancer ([facility] [address] ) 1998    Pt had in left breast- had radiation and surgery    CPAP (continuous positive airway pressure) dependence     Pt uses nightly    GERD (gastroesophageal reflux disease)     H/O cervical fracture     Hyperlipidemia     Irregular heart beat     Pt is taking Zetia  Pt does not have happen anymore per pt since on medication    Rheumatoid arthritis ([place] [address] )     Seasonal allergies     Sleep apnea     Wears glasses      Present on Admission:   Acute kidney injury ([place] [address] )   Pyelonephritis of left kidney   Gastroesophageal reflux disease without esophagitis   History of hyperlipidemia   [facility] ne[facility] of female breast ([facility] )   Rheumatoid arthritis (HCC)   Sepsis with acute renal failure without septic shock (HCC)      Admitting Diagnosis: Hypokalemia [E87 6]  Urinary tract infection [N39 0]  Pyelonephritis [N12]  Fever [R50 9]  JIN (acute kidney injury) ([place] [address] ) [N17 9]  Status post placement of ureteral stent [Z96 0]  Age/Sex: 63 y o  female     Admission Orders:  Scheduled Medications:  ascorbic acid, 500 mg, Oral, Daily  cefTRIAXone, 1,000 mg, Intravenous, Q12H  cholecalciferol, 2,000 Units, Oral, Daily  vitamin B-12, 1,000 mcg, Oral, Daily  ezetimibe, 10 mg, Oral, Daily  montelukast, 10 mg, Oral, Daily  multivitamin-minerals, 1 tablet, Oral, Daily  oxybutynin, 5 mg, Oral, TID  pantoprazole, 40 mg, Oral, Early Morning  senna-docusate sodium, 1 tablet, Oral, BID  tamsulosin, 0 4 mg, Oral, Daily With Dinner      Continuous IV Infusions:    sodium chloride 0 9 % infusion   Rate: 75 mL/hr Dose: 75 mL/hr  Freq: Continuous Route: IV  Indications of Use: IV Hydration  Last Dose: Stopped (05/22/21 0906)  Start: 05/21/21 0045 End: 05/22/21 0844    PRN Meds:  acetaminophen, 650 mg, Oral, Q4H PRN 5/21 x2, 5/22 x2  ondansetron, 4 mg, Intravenous, Q6H PRN 5/21 x1      Bld culture x2  Urine culture  IP CONSULT TO UROLOGY    Network Utilization Review Department  ATTENTION: Please call with any questions or concerns to [phone] and carefully listen to the prompts so that you are directed to the right person   All voicemails are confidential   Kaleb Delude all requests for admission clinical reviews, approved or denied determinations and any other requests to dedicated fax number below belonging to the campus where the patient is receiving treatment   List of dedicated fax numbers for the Facilities:  [address] [place] [address] DENIALS (Administrative/Medical Necessity) [phone]   [address] (Maternity/NICU/Pediatrics) [phone] [address] [place] [address] [address] [place] [address] [id number] [place] [address] [address] Pa[address] [facility] [place] [address] [phone]

## 2021-05-23 NOTE — PLAN OF CARE
Problem: PAIN - ADULT  Goal: Verbalizes/displays adequate comfort level or baseline comfort level  Description: Interventions:  - Encourage patient to monitor pain and request assistance  - Assess pain using appropriate pain scale  - Administer analgesics based on type and severity of pain and evaluate response  - Implement non-pharmacological measures as appropriate and evaluate response  - Consider cultural and social influences on pain and pain management  - Notify physician/advanced practitioner if interventions unsuccessful or patient reports new pain  5/23/2021 1457 by Anuradha Casas RN  Outcome: Completed  5/23/2021 0903 by Anuradha Casas RN  Outcome: Progressing     Problem: INFECTION - ADULT  Goal: Absence or prevention of progression during hospitalization  Description: INTERVENTIONS:  - Assess and monitor for signs and symptoms of infection  - Monitor lab/diagnostic results  - Monitor all insertion sites, i e  indwelling lines, tubes, and drains  - Monitor endotracheal if appropriate and nasal secretions for changes in amount and color  - [place] appropriate cooling/warming therapies per order  - Administer medications as ordered  - Instruct and encourage patient and family to use good hand hygiene technique  - Identify and instruct in appropriate isolation precautions for identified infection/condition  5/23/2021 1457 by Anuradha Casas RN  Outcome: Completed  5/23/2021 0903 by Anuradha Casas RN  Outcome: Progressing     Problem: SAFETY ADULT  Goal: Patient will remain free of falls  Description: INTERVENTIONS:  - Assess patient frequently for physical needs  -  Identify cognitive and physical deficits and behaviors that affect risk of falls    -  [place] fall precautions as indicated by assessment   - Educate patient/family on patient safety including physical limitations  - Instruct patient to call for assistance with activity based on assessment  - Modify environment to reduce risk of injury  - Consider OT/PT consult to assist with strengthening/mobility  5/23/2021 1457 by Geraldine Hunt RN  Outcome: Completed  5/23/2021 0903 by Geraldine Hunt RN  Outcome: Progressing  Goal: Maintain or return to baseline ADL function  Description: INTERVENTIONS:  -  Assess patient's ability to carry out ADLs; assess patient's baseline for ADL function and identify physical deficits which impact ability to perform ADLs (bathing, care of mouth/teeth, toileting, grooming, dressing, etc )  - Assess/evaluate cause of self-care deficits   - Assess range of motion  - Assess patient's mobility; develop plan if impaired  - Assess patient's need for assistive devices and provide as appropriate  - Encourage maximum independence but intervene and supervise when necessary  - Involve family in performance of ADLs  - Assess for home care needs following discharge   - Consider OT consult to assist with ADL evaluation and planning for discharge  - Provide patient education as appropriate  5/23/2021 1457 by Geraldine Hunt RN  Outcome: Completed  5/23/2021 0903 by Geraldine Hunt RN  Outcome: Progressing  Goal: Maintain or return mobility status to optimal level  Description: INTERVENTIONS:  - Assess patient's baseline mobility status (ambulation, transfers, stairs, etc )    - Identify cognitive and physical deficits and behaviors that affect mobility  - Identify mobility aids required to assist with transfers and/or ambulation (gait belt, sit-to-stand, lift, walker, cane, etc )  - [place] fall precautions as indicated by assessment  - Record patient progress and toleration of activity level on Mobility SBAR; progress patient to next Phase/Stage  - Instruct patient to call for assistance with activity based on assessment  - Consider rehabilitation consult to assist with strengthening/weightbearing, etc   5/23/2021 1457 by Geraldine Hunt RN  Outcome: Completed  5/23/2021 0903 by Geraldine Hunt RN  Outcome: Progressing Problem: DISCHARGE PLANNING  Goal: Discharge to home or other facility with appropriate resources  Description: INTERVENTIONS:  - Identify barriers to discharge w/patient and caregiver  - Arrange for needed discharge resources and transportation as appropriate  - Identify discharge learning needs (meds, wound care, etc )  - Arrange for interpretive services to assist at discharge as needed  - Refer to Case Management Department for coordinating discharge planning if the patient needs post-hospital services based on physician/advanced practitioner order or complex needs related to functional status, cognitive ability, or social support system  5/23/2021 1457 by Darline Paget, RN  Outcome: Completed  5/23/2021 0903 by Darline Paget, RN  Outcome: Progressing     Problem: Knowledge Deficit  Goal: Patient/family/caregiver demonstrates understanding of disease process, treatment plan, medications, and discharge instructions  Description: Complete learning assessment and assess knowledge base  Interventions:  - Provide teaching at level of understanding  - Provide teaching via preferred learning methods  5/23/2021 1457 by Darline Paget, RN  Outcome: Completed  5/23/2021 0903 by Darline Paget, RN  Outcome: Progressing     Problem: NEUROSENSORY - ADULT  Goal: Achieves maximal functionality and self care  Description: INTERVENTIONS  - Monitor swallowing and airway patency with patient fatigue and changes in neurological status  - Encourage and assist patient to increase activity and self care     - Encourage visually impaired, hearing impaired and aphasic patients to use assistive/communication devices  5/23/2021 1457 by Darline Paget, RN  Outcome: Completed  5/23/2021 0903 by Darline Paget, RN  Outcome: Progressing     Problem: CARDIOVASCULAR - ADULT  Goal: Maintains optimal cardiac output and hemodynamic stability  Description: INTERVENTIONS:  - Monitor I/O, vital signs and rhythm  - Monitor for S/S and trends of decreased cardiac output  - Administer and titrate ordered vasoactive medications to optimize hemodynamic stability  - Assess quality of pulses, skin color and temperature  - Assess for signs of decreased coronary artery perfusion  - Instruct patient to report change in severity of symptoms  5/23/2021 1457 by Leslee Beckett RN  Outcome: Completed  5/23/2021 0903 by Leslee Beckett RN  Outcome: Progressing     Problem: RESPIRATORY - ADULT  Goal: Achieves optimal ventilation and oxygenation  Description: INTERVENTIONS:  - Assess for changes in respiratory status  - Assess for changes in mentation and behavior  - Position to facilitate oxygenation and minimize respiratory effort  - Oxygen administered by appropriate delivery if ordered  - Initiate smoking cessation education as indicated  - Encourage broncho-pulmonary hygiene including cough, deep breathe, Incentive Spirometry  - Assess the need for suctioning and aspirate as needed  - Assess and instruct to report SOB or any respiratory difficulty  - Respiratory Therapy support as indicated  5/23/2021 1457 by Leslee Beckett RN  Outcome: Completed  5/23/2021 0903 by Leslee Beckett RN  Outcome: Progressing     Problem: GASTROINTESTINAL - ADULT  Goal: Minimal or absence of nausea and/or vomiting  Description: INTERVENTIONS:  - Administer IV fluids if ordered to ensure adequate hydration  - Maintain NPO status until nausea and vomiting are resolved  - Nasogastric tube if ordered  - Administer ordered antiemetic medications as needed  - Provide nonpharmacologic comfort measures as appropriate  - Advance diet as tolerated, if ordered  - Consider nutrition services referral to assist patient with adequate nutrition and appropriate food choices  5/23/2021 1457 by Leslee Beckett RN  Outcome: Completed  5/23/2021 0903 by Leslee Beckett RN  Outcome: Progressing  Goal: Maintains or returns to baseline bowel function  Description: INTERVENTIONS:  - Assess bowel function  - Encourage oral fluids to ensure adequate hydration  - Administer IV fluids if ordered to ensure adequate hydration  - Administer ordered medications as needed  - Encourage mobilization and activity  - Consider nutritional services referral to assist patient with adequate nutrition and appropriate food choices  5/23/2021 1457 by Camden De La Cruz RN  Outcome: Completed  5/23/2021 0903 by Camden De La Cruz RN  Outcome: Progressing  Goal: Maintains adequate nutritional intake  Description: INTERVENTIONS:  - Monitor percentage of each meal consumed  - Identify factors contributing to decreased intake, treat as appropriate  - Assist with meals as needed  - Monitor I&O, weight, and lab values if indicated  - Obtain nutrition services referral as needed  5/23/2021 1457 by Camden De La Cruz RN  Outcome: Completed  5/23/2021 0903 by Camden De La Cruz RN  Outcome: Progressing     Problem: GENITOURINARY - ADULT  Goal: Maintains or returns to baseline urinary function  Description: INTERVENTIONS:  - Assess urinary function  - Encourage oral fluids to ensure adequate hydration if ordered  - Administer IV fluids as ordered to ensure adequate hydration  - Administer ordered medications as needed  - Offer frequent toileting  - Follow urinary retention protocol if ordered  5/23/2021 1457 by Camden De La Cruz RN  Outcome: Completed  5/23/2021 0903 by Camden De La Cruz RN  Outcome: Progressing     Problem: METABOLIC, FLUID AND ELECTROLYTES - ADULT  Goal: Electrolytes maintained within normal limits  Description: INTERVENTIONS:  - Monitor labs and assess patient for signs and symptoms of electrolyte imbalances  - Administer electrolyte replacement as ordered  - Monitor response to electrolyte replacements, including repeat lab results as appropriate  - Instruct patient on fluid and nutrition as appropriate  5/23/2021 1457 by Camden De La Cruz RN  Outcome: Completed  5/23/2021 0903 by Camden De La Cruz RN  Outcome: Progressing     Problem: SKIN/TISSUE INTEGRITY - ADULT  Goal: Skin integrity remains intact  Description: INTERVENTIONS  - Identify patients at risk for skin breakdown  - Assess and monitor skin integrity  - Assess and monitor nutrition and hydration status  - Monitor labs (i e  albumin)  - Assess for incontinence   - Turn and reposition patient  - Assist with mobility/ambulation  - Relieve pressure over bony prominences  - Avoid friction and shearing  - Provide appropriate hygiene as needed including keeping skin clean and dry  - Evaluate need for skin moisturizer/barrier cream  - Collaborate with interdisciplinary team (i e  Nutrition, Rehabilitation, etc )   - Patient/family teaching  5/23/2021 1457 by Leticia Mcburney, RN  Outcome: Completed  5/23/2021 0903 by Leticia Mcburney, RN  Outcome: Progressing     Problem: HEMATOLOGIC - ADULT  Goal: Maintains hematologic stability  Description: INTERVENTIONS  - Assess for signs and symptoms of bleeding or hemorrhage  - Monitor labs  - Administer supportive blood products/factors as ordered and appropriate  5/23/2021 1457 by Leticia Mcburney, RN  Outcome: Completed  5/23/2021 0903 by Leticia Mcburney, RN  Outcome: Progressing     Problem: MUSCULOSKELETAL - ADULT  Goal: Maintain or return mobility to safest level of function  Description: INTERVENTIONS:  - Assess patient's ability to carry out ADLs; assess patient's baseline for ADL function and identify physical deficits which impact ability to perform ADLs (bathing, care of mouth/teeth, toileting, grooming, dressing, etc )  - Assess/evaluate cause of self-care deficits   - Assess range of motion  - Assess patient's mobility  - Assess patient's need for assistive devices and provide as appropriate  - Encourage maximum independence but intervene and supervise when necessary  - Involve family in performance of ADLs  - Assess for home care needs following discharge   - Consider OT consult to assist with ADL evaluation and planning for discharge  - Provide patient education as appropriate  5/23/2021 1457 by Barbara Camacho RN  Outcome: Completed  5/23/2021 0903 by Barbara Camacho RN  Outcome: Progressing     Problem: Potential for Falls  Goal: Patient will remain free of falls  Description: INTERVENTIONS:  - Assess patient frequently for physical needs  -  Identify cognitive and physical deficits and behaviors that affect risk of falls    -  [place] fall precautions as indicated by assessment   - Educate patient/family on patient safety including physical limitations  - Instruct patient to call for assistance with activity based on assessment  - Modify environment to reduce risk of injury  - Consider OT/PT consult to assist with strengthening/mobility  5/23/2021 1457 by Barbara Camacho RN  Outcome: Completed  5/23/2021 0903 by Barbara Camacho RN  Outcome: Progressing

## 2021-05-23 NOTE — ASSESSMENT & PLAN NOTE
· POA with recent lithotripsy and left ureteral stent placement on 5/19, treated with Bactrim as outpatient  · Severe left flank pain with associated fever  · Found to have pyuria/bacteriuria with CT abdomen/pelvis revealing left pyelonephritis  · Continue empiric Rocephin  · Urine culture neg and blood cultures negative  Patient is already on antibiotics and hence urine culture may be falsely negative  · Lactic acid normal  · Appreciate urology input    Follow up with them outpatient  · Will discharge home on a course of oral Keflex for 7 days

## 2021-05-23 NOTE — ASSESSMENT & PLAN NOTE
· POA with fever at home, leukocytosis, JIN, left pyelonephritis  · Lactic acid normal  · Continue antibiotics  Urine culture and blood culture negative    Sepsis has resolved

## 2021-05-23 NOTE — DISCHARGE SUMMARY
[address]  Discharge- Lukas Alvarez 1960, 63 y o  female MRN: [id number]  Unit/Bed#: -[facility] Encounter: [id number]  Primary Care Provider: Angel Levy MD   Date and time admitted to hospital: 5/20/2021 10:58 PM    * Pyelonephritis of left kidney  Assessment & Plan  · POA with recent lithotripsy and left ureteral stent placement on 5/19, treated with Bactrim as outpatient  · Severe left flank pain with associated fever  · Found to have pyuria/bacteriuria with CT abdomen/pelvis revealing left pyelonephritis  · Continue empiric Rocephin  · Urine culture neg and blood cultures negative  Patient is already on antibiotics and hence urine culture may be falsely negative  · Lactic acid normal  · Appreciate urology input  Follow up with them outpatient  · Will discharge home on a course of oral Keflex for 7 days    Acute kidney injury ([facility] [address] )  Assessment & Plan  · POA with creatinine 1 41  · Baseline 0 95  · Renal function is back to baseline and hence discontinue IV fluids    Sepsis with acute renal failure without septic shock (HCC)  Assessment & Plan  · POA with fever at home, leukocytosis, JIN, left pyelonephritis  · Lactic acid normal  · Continue antibiotics  Urine culture and blood culture negative  Sepsis has resolved    History of hyperlipidemia  Assessment & Plan  · Continue Zetia  · Outpatient follow-up    Malignant neoplasm of female breast [facility])  Assessment & Plan  · History of left breast cancer 1998  · Outpatient follow-up    Gastroesophageal reflux disease without esophagitis  Assessment & Plan  · Continue PPI with Protonix  stable    Rheumatoid arthritis ([facility] [address] )  Assessment & Plan  · Outpatient follow-up  · Nelida Saha as outpatient, currently on hold due to surgical procedure      Discharging Physician / Practitioner: Monico Hudson MD  PCP: Angel Leyv MD  Admission Date:   Admission Orders (From admission, onward)     Ordered        05/21/21 0137  Inpatient Admission  Once                   Discharge Date: 05/23/21    Resolved Problems  Date Reviewed: 5/23/2021    None          Consultations During Hospital Stay:  · Urology    Procedures Performed:   · None    Significant Findings / Test Results:   Ct Abdomen Pelvis Wo Contrast    Result Date: 5/21/2021  Impression: 1  Left ureteral stent in expected position  No hydronephrosis or hydroureter  Residual calculi in left renal calyces measuring up to 10 mm  2   Mild left perinephric and periureteral inflammatory or congestive change  Pyelonephritis not excluded  Workstation performed: [id number]     Incidental Findings:   · None     Test Results Pending at Discharge (will require follow up): · None     Outpatient Tests Requested:  · CBC, BMP in 1 week    Complications:  None    Reason for Admission:  Fevers and chills    Hospital Course:     Jemal Paul is a 63 y o  female patient who originally presented to the hospital on 5/20/2021 due to fever and chills found to have acute left-sided pyelonephritis  Recently also had stent placed  Repeat urine culture and blood culture are negative however previous urine culture was Proteus  Responded well to IV Rocephin  Will discharge home on a 7 day course of Keflex to complete a total 10 day course of antibiotics  Follow up outpatient with Urology  Stent in place      Please see above list of diagnoses and related plan for additional information  Condition at Discharge: good     Discharge Day Visit / Exam:     Subjective:  Patient denies any chest pain or shortness of breath or abdominal pain  Denies any fevers or chills today    Last fever spike was yesterday evening  Vitals: Blood Pressure: 125/73 (05/23/21 0635)  Pulse: 83 (05/23/21 0912)  Temperature: 98 6 °F (37 °C) (05/23/21 0912)  Temp Source: Oral (05/22/21 1421)  Respirations: 20 (05/23/21 0635)  Height: 5' 4" (162 6 cm) (05/21/21 0413)  Weight - Scale: 90 9 kg (200 lb 6 4 oz) (05/21/21 0413)  SpO2: 96 % (05/23/21 0912)  Exam:   Physical Exam  Vitals signs and nursing note reviewed  Constitutional:       Appearance: Normal appearance  HENT:      Head: Normocephalic and atraumatic  Right Ear: External ear normal       Left Ear: External ear normal       Nose: Nose normal       Mouth/Throat:      Pharynx: Oropharynx is clear  Eyes:      Pupils: Pupils are equal, round, and reactive to light  Neck:      Musculoskeletal: Normal range of motion and neck supple  Cardiovascular:      Rate and Rhythm: Normal rate and regular rhythm  Heart sounds: Normal heart sounds  Pulmonary:      Effort: Pulmonary effort is normal       Breath sounds: Normal breath sounds  Abdominal:      General: Bowel sounds are normal       Palpations: Abdomen is soft  Tenderness: There is no abdominal tenderness  Musculoskeletal: Normal range of motion  Skin:     General: Skin is warm and dry  Capillary Refill: Capillary refill takes less than 2 seconds  Neurological:      General: No focal deficit present  Mental Status: She is alert and oriented to person, place, and time  Psychiatric:         Mood and Affect: Mood normal          Discussion with Family:  None    Discharge instructions/Information to patient and family:   See after visit summary for information provided to patient and family  Provisions for Follow-Up Care:  See after visit summary for information related to follow-up care and any pertinent home health orders  Disposition:     Home    For Discharges to [facility] SNF:   · Not Applicable to this Patient - Not Applicable to this Patient    Planned Readmission:  None     Discharge Statement:  I spent 35 minutes discharging the patient  This time was spent on the day of discharge  I had direct contact with the patient on the day of discharge   Greater than 50% of the total time was spent examining patient, answering all patient questions, arranging and discussing plan of care with patient as well as directly providing post-discharge instructions  Additional time then spent on discharge activities  Discharge Medications:  See after visit summary for reconciled discharge medications provided to patient and family        ** Please Note: This note has been constructed using a voice recognition system **

## 2021-05-23 NOTE — ASSESSMENT & PLAN NOTE
· Outpatient follow-up  · Rhesa Fothergill as outpatient, currently on hold due to surgical procedure

## 2021-05-24 NOTE — UTILIZATION REVIEW
Notification of Discharge   This is a Notification of Discharge from our facility [address]  Please be advised that this patient has been discharge from our facility  Below you will find the admission and discharge date and time including the patients disposition  UTILIZATION REVIEW CONTACT:  Sugey Harrison  Utilization   Network Utilization Review Department  Phone: [phone] x carefully listen to the prompts  All voicemails are confidential   Email: [email or web address]     PHYSICIAN ADVISORY SERVICES:  FOR [facility]LD-ME-[facility] REVIEW - MEDICAL NECESSITY DENIAL  Phone: [phone]  Fax: [phone]  Email: [email or web address]     PRESENTATION DATE: 5/20/2021 10:58 PM  OBERVATION ADMISSION DATE:  INPATIENT ADMISSION DATE: 5/21/21 0137   DISCHARGE DATE: 5/23/2021  1:48 PM  DISPOSITION: Home/Self Care Home/Self Care      IMPORTANT INFORMATION:  Send all requests for admission clinical reviews, approved or denied determinations and any other requests to dedicated fax number below belonging to the campus where the patient is receiving treatment   List of dedicated fax numbers:  [address] DENIALS (Administrative/Medical Necessity) [phone]   [address]  (Maternity/NICU/Pediatrics) [phone]   [facility] Cristobal [phone]   [facility] [phone]   [facility] Bowie [phone]   [facility] [address] [phone]   [facility]  [phone]   [address] [facility] [address]  [address] [facility] [address] [facility] [phone]

## 2021-05-24 NOTE — TELEPHONE ENCOUNTER
Called and spoke with patient   Patient scheduled 06/04/21 at 315pm in the [place] office for cysto stent removal

## 2021-05-24 NOTE — TELEPHONE ENCOUNTER
Patient called in regarding her hospital follow up and stent removal  Patient can be reached at [phone]

## 2021-05-24 NOTE — TELEPHONE ENCOUNTER
Pt currently admitted to hospital   Upon discharge she is to be scheduled with Dr Lucero Green on June 4 for cysto/stent removal   Dr Lucero Green stated it was ok to double book pt for that day

## 2021-05-26 LAB
BACTERIA BLD CULT: NORMAL
BACTERIA BLD CULT: NORMAL

## 2021-06-03 NOTE — TELEPHONE ENCOUNTER
Patient called in to cancel 6/4 appt because the stent came out on its own  Patient stated she is not having any problems   Patient can be reached at [phone]

## 2021-06-14 DIAGNOSIS — N20.0 STAGHORN CALCULUS: ICD-10-CM

## 2021-06-14 RX ORDER — TAMSULOSIN HYDROCHLORIDE 0.4 MG/1
CAPSULE ORAL
Qty: 30 CAPSULE | Refills: 0 | Status: SHIPPED | OUTPATIENT
Start: 2021-06-14

## 2021-11-19 ENCOUNTER — HOSPITAL ENCOUNTER (OUTPATIENT)
Dept: ULTRASOUND IMAGING | Facility: HOSPITAL | Age: 61
Discharge: HOME | End: 2021-11-19
Attending: UROLOGY
Payer: COMMERCIAL

## 2021-11-19 DIAGNOSIS — N20.0 STAGHORN CALCULUS: ICD-10-CM

## 2021-11-19 DIAGNOSIS — N20.1 URETERAL CALCULUS: ICD-10-CM

## 2021-11-19 PROCEDURE — 76770 US EXAM ABDO BACK WALL COMP: CPT

## 2021-12-09 ENCOUNTER — OFFICE VISIT (OUTPATIENT)
Dept: UROLOGY | Facility: CLINIC | Age: 61
End: 2021-12-09
Payer: COMMERCIAL

## 2021-12-09 VITALS
WEIGHT: 195 LBS | SYSTOLIC BLOOD PRESSURE: 118 MMHG | DIASTOLIC BLOOD PRESSURE: 76 MMHG | BODY MASS INDEX: 33.29 KG/M2 | HEART RATE: 78 BPM | HEIGHT: 64 IN

## 2021-12-09 DIAGNOSIS — N20.1 URETERAL CALCULUS: ICD-10-CM

## 2021-12-09 DIAGNOSIS — N20.0 STAGHORN CALCULUS: Primary | ICD-10-CM

## 2021-12-09 PROCEDURE — 99213 OFFICE O/P EST LOW 20 MIN: CPT | Performed by: UROLOGY

## 2021-12-09 RX ORDER — ASPIRIN 81 MG/1
81 TABLET ORAL DAILY
COMMUNITY

## 2022-02-21 ENCOUNTER — APPOINTMENT (EMERGENCY)
Dept: RADIOLOGY | Facility: HOSPITAL | Age: 62
End: 2022-02-21
Payer: COMMERCIAL

## 2022-02-21 ENCOUNTER — APPOINTMENT (OUTPATIENT)
Dept: NON INVASIVE DIAGNOSTICS | Facility: HOSPITAL | Age: 62
End: 2022-02-21
Payer: COMMERCIAL

## 2022-02-21 ENCOUNTER — HOSPITAL ENCOUNTER (OUTPATIENT)
Facility: HOSPITAL | Age: 62
Setting detail: OBSERVATION
Discharge: HOME | End: 2022-02-22
Attending: EMERGENCY MEDICINE | Admitting: FAMILY MEDICINE
Payer: COMMERCIAL

## 2022-02-21 DIAGNOSIS — I30.0 ACUTE IDIOPATHIC PERICARDITIS: ICD-10-CM

## 2022-02-21 DIAGNOSIS — R07.9 CHEST PAIN: Primary | ICD-10-CM

## 2022-02-21 PROBLEM — N17.9 ACUTE KIDNEY INJURY (HCC): Status: RESOLVED | Noted: 2021-04-07 | Resolved: 2022-02-21

## 2022-02-21 PROBLEM — A41.9 SEPSIS (HCC): Status: RESOLVED | Noted: 2021-04-07 | Resolved: 2022-02-21

## 2022-02-21 PROBLEM — A41.9 SEPSIS WITH ACUTE RENAL FAILURE WITHOUT SEPTIC SHOCK (HCC): Status: RESOLVED | Noted: 2021-05-21 | Resolved: 2022-02-21

## 2022-02-21 PROBLEM — R65.20 SEPSIS WITH ACUTE RENAL FAILURE WITHOUT SEPTIC SHOCK (HCC): Status: RESOLVED | Noted: 2021-05-21 | Resolved: 2022-02-21

## 2022-02-21 PROBLEM — N17.9 SEPSIS WITH ACUTE RENAL FAILURE WITHOUT SEPTIC SHOCK (HCC): Status: RESOLVED | Noted: 2021-05-21 | Resolved: 2022-02-21

## 2022-02-21 PROBLEM — N20.1 LEFT URETERAL STONE: Status: RESOLVED | Noted: 2021-04-07 | Resolved: 2022-02-21

## 2022-02-21 PROBLEM — R07.2 PRECORDIAL CHEST PAIN: Status: ACTIVE | Noted: 2022-02-21

## 2022-02-21 PROBLEM — N12 PYELONEPHRITIS OF LEFT KIDNEY: Status: RESOLVED | Noted: 2021-04-07 | Resolved: 2022-02-21

## 2022-02-21 LAB
2HR DELTA HS TROPONIN: 1 NG/L
ALBUMIN SERPL BCP-MCNC: 3.7 G/DL (ref 3.5–5)
ALP SERPL-CCNC: 163 U/L (ref 46–116)
ALT SERPL W P-5'-P-CCNC: 44 U/L (ref 12–78)
ANION GAP SERPL CALCULATED.3IONS-SCNC: 12 MMOL/L (ref 4–13)
AORTIC VALVE ANNULUS: 3.4 CM (ref 1.74–2.53)
APICAL FOUR CHAMBER EJECTION FRACTION: 76 %
APTT PPP: 29 SECONDS (ref 23–37)
ASCENDING AORTA: 3 CM (ref 2.07–3.1)
AST SERPL W P-5'-P-CCNC: 26 U/L (ref 5–45)
BASOPHILS # BLD AUTO: 0.05 THOUSANDS/ΜL (ref 0–0.1)
BASOPHILS NFR BLD AUTO: 0 % (ref 0–1)
BILIRUB SERPL-MCNC: 0.47 MG/DL (ref 0.2–1)
BUN SERPL-MCNC: 12 MG/DL (ref 5–25)
CALCIUM SERPL-MCNC: 9.3 MG/DL (ref 8.3–10.1)
CARDIAC TROPONIN I PNL SERPL HS: 3 NG/L
CARDIAC TROPONIN I PNL SERPL HS: 4 NG/L
CARDIAC TROPONIN I PNL SERPL HS: 6 NG/L (ref 8–18)
CHLORIDE SERPL-SCNC: 102 MMOL/L (ref 100–108)
CHOLEST SERPL-MCNC: 265 MG/DL
CK SERPL-CCNC: 66 U/L (ref 26–192)
CO2 SERPL-SCNC: 27 MMOL/L (ref 21–32)
CREAT SERPL-MCNC: 0.94 MG/DL (ref 0.6–1.3)
CRP SERPL QL: 46.8 MG/L
E WAVE DECELERATION TIME: 165 MS
EOSINOPHIL # BLD AUTO: 0.23 THOUSAND/ΜL (ref 0–0.61)
EOSINOPHIL NFR BLD AUTO: 2 % (ref 0–6)
ERYTHROCYTE [DISTWIDTH] IN BLOOD BY AUTOMATED COUNT: 12.4 % (ref 11.6–15.1)
ERYTHROCYTE [SEDIMENTATION RATE] IN BLOOD: 38 MM/HOUR (ref 0–29)
EST. AVERAGE GLUCOSE BLD GHB EST-MCNC: 111 MG/DL
FERRITIN SERPL-MCNC: 151 NG/ML (ref 8–388)
FRACTIONAL SHORTENING: 45 % (ref 28–44)
GFR SERPL CREATININE-BSD FRML MDRD: 66 ML/MIN/1.73SQ M
GLUCOSE SERPL-MCNC: 123 MG/DL (ref 65–140)
HBA1C MFR BLD: 5.5 %
HCT VFR BLD AUTO: 39.4 % (ref 34.8–46.1)
HDLC SERPL-MCNC: 67 MG/DL
HGB BLD-MCNC: 12.9 G/DL (ref 11.5–15.4)
IMM GRANULOCYTES # BLD AUTO: 0.04 THOUSAND/UL (ref 0–0.2)
IMM GRANULOCYTES NFR BLD AUTO: 0 % (ref 0–2)
INR PPP: 0.9 (ref 0.84–1.19)
INTERVENTRICULAR SEPTUM IN DIASTOLE (PARASTERNAL SHORT AXIS VIEW): 0.8 CM (ref 0.54–1.01)
INTERVENTRICULAR SEPTUM: 0.8 CM (ref 0.6–1.1)
LAAS-AP4: 18 CM2
LDLC SERPL CALC-MCNC: 149 MG/DL (ref 0–100)
LEFT ATRIUM SIZE: 2.8 CM
LEFT INTERNAL DIMENSION IN SYSTOLE: 2.2 CM (ref 3.17–4.79)
LEFT VENTRICULAR INTERNAL DIMENSION IN DIASTOLE: 4 CM (ref 5.22–7.77)
LEFT VENTRICULAR POSTERIOR WALL IN END DIASTOLE: 0.9 CM (ref 0.53–1)
LEFT VENTRICULAR STROKE VOLUME: 56 ML
LVSV (TEICH): 56 ML
LYMPHOCYTES # BLD AUTO: 0.96 THOUSANDS/ΜL (ref 0.6–4.47)
LYMPHOCYTES NFR BLD AUTO: 8 % (ref 14–44)
MCH RBC QN AUTO: 28.8 PG (ref 26.8–34.3)
MCHC RBC AUTO-ENTMCNC: 32.7 G/DL (ref 31.4–37.4)
MCV RBC AUTO: 88 FL (ref 82–98)
MONOCYTES # BLD AUTO: 0.91 THOUSAND/ΜL (ref 0.17–1.22)
MONOCYTES NFR BLD AUTO: 8 % (ref 4–12)
MV E'TISSUE VEL-LAT: 7 CM/S
MV E'TISSUE VEL-SEP: 4 CM/S
MV PEAK A VEL: 1.16 M/S
MV PEAK E VEL: 58 CM/S
NEUTROPHILS # BLD AUTO: 9.19 THOUSANDS/ΜL (ref 1.85–7.62)
NEUTS SEG NFR BLD AUTO: 82 % (ref 43–75)
NRBC BLD AUTO-RTO: 0 /100 WBCS
PLATELET # BLD AUTO: 281 THOUSANDS/UL (ref 149–390)
PMV BLD AUTO: 9.9 FL (ref 8.9–12.7)
POTASSIUM SERPL-SCNC: 3.7 MMOL/L (ref 3.5–5.3)
PROT SERPL-MCNC: 7.7 G/DL (ref 6.4–8.2)
PROTHROMBIN TIME: 12.1 SECONDS (ref 11.6–14.5)
RBC # BLD AUTO: 4.48 MILLION/UL (ref 3.81–5.12)
SL CV LV EF: 70
SL CV PED ECHO LEFT VENTRICLE DIASTOLIC VOLUME (MOD BIPLANE) 2D: 71 ML
SL CV PED ECHO LEFT VENTRICLE SYSTOLIC VOLUME (MOD BIPLANE) 2D: 16 ML
SODIUM SERPL-SCNC: 141 MMOL/L (ref 136–145)
TRICUSPID ANNULAR PLANE SYSTOLIC EXCURSION: 2 CM
TRIGL SERPL-MCNC: 244 MG/DL
WBC # BLD AUTO: 11.38 THOUSAND/UL (ref 4.31–10.16)
Z-SCORE OF AORTIC VALVE ANNULUS: 6.27
Z-SCORE OF ASCENDING AORTA: 1.61
Z-SCORE OF INTERVENTRICULAR SEPTUM IN END DIASTOLE: 0.19
Z-SCORE OF LEFT VENTRICULAR DIMENSION IN END DIASTOLE: -4.65
Z-SCORE OF LEFT VENTRICULAR DIMENSION IN END SYSTOLE: -4.52
Z-SCORE OF LEFT VENTRICULAR POSTERIOR WALL IN END DIASTOLE: 1.13

## 2022-02-21 PROCEDURE — 82550 ASSAY OF CK (CPK): CPT | Performed by: FAMILY MEDICINE

## 2022-02-21 PROCEDURE — 80053 COMPREHEN METABOLIC PANEL: CPT | Performed by: EMERGENCY MEDICINE

## 2022-02-21 PROCEDURE — 82728 ASSAY OF FERRITIN: CPT | Performed by: FAMILY MEDICINE

## 2022-02-21 PROCEDURE — 99285 EMERGENCY DEPT VISIT HI MDM: CPT | Performed by: EMERGENCY MEDICINE

## 2022-02-21 PROCEDURE — 71045 X-RAY EXAM CHEST 1 VIEW: CPT

## 2022-02-21 PROCEDURE — 94660 CPAP INITIATION&MGMT: CPT

## 2022-02-21 PROCEDURE — 85610 PROTHROMBIN TIME: CPT | Performed by: EMERGENCY MEDICINE

## 2022-02-21 PROCEDURE — 93005 ELECTROCARDIOGRAM TRACING: CPT

## 2022-02-21 PROCEDURE — 83036 HEMOGLOBIN GLYCOSYLATED A1C: CPT | Performed by: FAMILY MEDICINE

## 2022-02-21 PROCEDURE — 85652 RBC SED RATE AUTOMATED: CPT | Performed by: PHYSICIAN ASSISTANT

## 2022-02-21 PROCEDURE — 99285 EMERGENCY DEPT VISIT HI MDM: CPT

## 2022-02-21 PROCEDURE — C9113 INJ PANTOPRAZOLE SODIUM, VIA: HCPCS | Performed by: EMERGENCY MEDICINE

## 2022-02-21 PROCEDURE — 94760 N-INVAS EAR/PLS OXIMETRY 1: CPT

## 2022-02-21 PROCEDURE — 36415 COLL VENOUS BLD VENIPUNCTURE: CPT

## 2022-02-21 PROCEDURE — 86140 C-REACTIVE PROTEIN: CPT | Performed by: PHYSICIAN ASSISTANT

## 2022-02-21 PROCEDURE — 80061 LIPID PANEL: CPT | Performed by: FAMILY MEDICINE

## 2022-02-21 PROCEDURE — 85025 COMPLETE CBC W/AUTO DIFF WBC: CPT | Performed by: EMERGENCY MEDICINE

## 2022-02-21 PROCEDURE — 84484 ASSAY OF TROPONIN QUANT: CPT | Performed by: EMERGENCY MEDICINE

## 2022-02-21 PROCEDURE — 84484 ASSAY OF TROPONIN QUANT: CPT | Performed by: FAMILY MEDICINE

## 2022-02-21 PROCEDURE — 93306 TTE W/DOPPLER COMPLETE: CPT

## 2022-02-21 PROCEDURE — 85730 THROMBOPLASTIN TIME PARTIAL: CPT | Performed by: EMERGENCY MEDICINE

## 2022-02-21 PROCEDURE — 99220 PR INITIAL OBSERVATION CARE/DAY 70 MINUTES: CPT | Performed by: FAMILY MEDICINE

## 2022-02-21 PROCEDURE — 96374 THER/PROPH/DIAG INJ IV PUSH: CPT

## 2022-02-21 RX ORDER — ASPIRIN 81 MG/1
81 TABLET ORAL DAILY
Status: DISCONTINUED | OUTPATIENT
Start: 2022-02-21 | End: 2022-02-22 | Stop reason: HOSPADM

## 2022-02-21 RX ORDER — ASPIRIN 81 MG/1
324 TABLET, CHEWABLE ORAL ONCE
Status: COMPLETED | OUTPATIENT
Start: 2022-02-21 | End: 2022-02-21

## 2022-02-21 RX ORDER — PANTOPRAZOLE SODIUM 40 MG/1
40 TABLET, DELAYED RELEASE ORAL
Status: DISCONTINUED | OUTPATIENT
Start: 2022-02-22 | End: 2022-02-22 | Stop reason: HOSPADM

## 2022-02-21 RX ORDER — TAMSULOSIN HYDROCHLORIDE 0.4 MG/1
0.4 CAPSULE ORAL
Status: DISCONTINUED | OUTPATIENT
Start: 2022-02-21 | End: 2022-02-22 | Stop reason: HOSPADM

## 2022-02-21 RX ORDER — HYDROCODONE BITARTRATE AND ACETAMINOPHEN 5; 325 MG/1; MG/1
1 TABLET ORAL EVERY 6 HOURS PRN
Status: DISCONTINUED | OUTPATIENT
Start: 2022-02-21 | End: 2022-02-22 | Stop reason: HOSPADM

## 2022-02-21 RX ORDER — SENNOSIDES 8.8 MG/5ML
8.8 LIQUID ORAL DAILY
Status: DISCONTINUED | OUTPATIENT
Start: 2022-02-21 | End: 2022-02-22 | Stop reason: HOSPADM

## 2022-02-21 RX ORDER — PANTOPRAZOLE SODIUM 40 MG/1
40 INJECTION, POWDER, FOR SOLUTION INTRAVENOUS ONCE
Status: COMPLETED | OUTPATIENT
Start: 2022-02-21 | End: 2022-02-21

## 2022-02-21 RX ORDER — ONDANSETRON 2 MG/ML
4 INJECTION INTRAMUSCULAR; INTRAVENOUS EVERY 6 HOURS PRN
Status: DISCONTINUED | OUTPATIENT
Start: 2022-02-21 | End: 2022-02-22 | Stop reason: HOSPADM

## 2022-02-21 RX ORDER — DOCUSATE SODIUM 100 MG/1
100 CAPSULE, LIQUID FILLED ORAL 2 TIMES DAILY
Status: DISCONTINUED | OUTPATIENT
Start: 2022-02-21 | End: 2022-02-22 | Stop reason: HOSPADM

## 2022-02-21 RX ORDER — HEPARIN SODIUM 5000 [USP'U]/ML
5000 INJECTION, SOLUTION INTRAVENOUS; SUBCUTANEOUS EVERY 8 HOURS SCHEDULED
Status: DISCONTINUED | OUTPATIENT
Start: 2022-02-21 | End: 2022-02-22 | Stop reason: HOSPADM

## 2022-02-21 RX ORDER — OXYBUTYNIN CHLORIDE 5 MG/1
5 TABLET ORAL 3 TIMES DAILY PRN
Status: DISCONTINUED | OUTPATIENT
Start: 2022-02-21 | End: 2022-02-22 | Stop reason: HOSPADM

## 2022-02-21 RX ORDER — COLCHICINE 0.6 MG/1
0.6 TABLET ORAL 2 TIMES DAILY
Status: DISCONTINUED | OUTPATIENT
Start: 2022-02-21 | End: 2022-02-22 | Stop reason: HOSPADM

## 2022-02-21 RX ORDER — NITROGLYCERIN 0.4 MG/1
0.4 TABLET SUBLINGUAL
Status: DISCONTINUED | OUTPATIENT
Start: 2022-02-21 | End: 2022-02-22 | Stop reason: HOSPADM

## 2022-02-21 RX ORDER — LEFLUNOMIDE 20 MG/1
20 TABLET ORAL DAILY
Status: DISCONTINUED | OUTPATIENT
Start: 2022-02-21 | End: 2022-02-22 | Stop reason: HOSPADM

## 2022-02-21 RX ORDER — EZETIMIBE 10 MG/1
10 TABLET ORAL DAILY
Status: DISCONTINUED | OUTPATIENT
Start: 2022-02-21 | End: 2022-02-22 | Stop reason: HOSPADM

## 2022-02-21 RX ORDER — MONTELUKAST SODIUM 10 MG/1
10 TABLET ORAL DAILY
Status: DISCONTINUED | OUTPATIENT
Start: 2022-02-21 | End: 2022-02-22 | Stop reason: HOSPADM

## 2022-02-21 RX ORDER — ACETAMINOPHEN 325 MG/1
650 TABLET ORAL EVERY 6 HOURS PRN
Status: DISCONTINUED | OUTPATIENT
Start: 2022-02-21 | End: 2022-02-22 | Stop reason: HOSPADM

## 2022-02-21 RX ORDER — NITROGLYCERIN 0.4 MG/1
0.4 TABLET SUBLINGUAL
Status: DISCONTINUED | OUTPATIENT
Start: 2022-02-21 | End: 2022-02-21

## 2022-02-21 RX ORDER — LEFLUNOMIDE 20 MG/1
20 TABLET ORAL DAILY
Status: DISCONTINUED | OUTPATIENT
Start: 2022-02-21 | End: 2022-02-21

## 2022-02-21 RX ORDER — FLUTICASONE PROPIONATE 50 MCG
1 SPRAY, SUSPENSION (ML) NASAL DAILY
Status: DISCONTINUED | OUTPATIENT
Start: 2022-02-21 | End: 2022-02-22 | Stop reason: HOSPADM

## 2022-02-21 RX ORDER — IBUPROFEN 400 MG/1
800 TABLET ORAL EVERY 8 HOURS
Status: DISCONTINUED | OUTPATIENT
Start: 2022-02-21 | End: 2022-02-22 | Stop reason: HOSPADM

## 2022-02-21 RX ADMIN — COLCHICINE 0.6 MG: 0.6 TABLET ORAL at 13:07

## 2022-02-21 RX ADMIN — EZETIMIBE 10 MG: 10 TABLET ORAL at 11:53

## 2022-02-21 RX ADMIN — COLCHICINE 0.6 MG: 0.6 TABLET ORAL at 16:59

## 2022-02-21 RX ADMIN — HYDROCODONE BITARTRATE AND ACETAMINOPHEN 1 TABLET: 5; 325 TABLET ORAL at 18:04

## 2022-02-21 RX ADMIN — PANTOPRAZOLE SODIUM 40 MG: 40 INJECTION, POWDER, FOR SOLUTION INTRAVENOUS at 09:54

## 2022-02-21 RX ADMIN — IBUPROFEN 800 MG: 400 TABLET ORAL at 21:11

## 2022-02-21 RX ADMIN — LEFLUNOMIDE 20 MG: 20 TABLET, FILM COATED ORAL at 19:30

## 2022-02-21 RX ADMIN — NITROGLYCERIN 0.4 MG: 0.4 TABLET SUBLINGUAL at 16:32

## 2022-02-21 RX ADMIN — MONTELUKAST 10 MG: 10 TABLET, FILM COATED ORAL at 11:53

## 2022-02-21 RX ADMIN — MORPHINE SULFATE 2 MG: 2 INJECTION, SOLUTION INTRAMUSCULAR; INTRAVENOUS at 19:29

## 2022-02-21 RX ADMIN — NITROGLYCERIN 0.4 MG: 0.4 TABLET SUBLINGUAL at 10:14

## 2022-02-21 RX ADMIN — HEPARIN SODIUM 5000 UNITS: 5000 INJECTION INTRAVENOUS; SUBCUTANEOUS at 21:12

## 2022-02-21 RX ADMIN — DOCUSATE SODIUM 100 MG: 100 CAPSULE ORAL at 16:59

## 2022-02-21 RX ADMIN — ASPIRIN 81 MG CHEWABLE TABLET 324 MG: 81 TABLET CHEWABLE at 09:53

## 2022-02-21 RX ADMIN — HYDROCODONE BITARTRATE AND ACETAMINOPHEN 1 TABLET: 5; 325 TABLET ORAL at 11:56

## 2022-02-21 RX ADMIN — NITROGLYCERIN 0.4 MG: 0.4 TABLET SUBLINGUAL at 09:53

## 2022-02-21 NOTE — PLAN OF CARE
Problem: PAIN - ADULT  Goal: Verbalizes/displays adequate comfort level or baseline comfort level  Description: Interventions:  - Encourage patient to monitor pain and request assistance  - Assess pain using appropriate pain scale  - Administer analgesics based on type and severity of pain and evaluate response  - Implement non-pharmacological measures as appropriate and evaluate response  - Consider cultural and social influences on pain and pain management  - Notify physician/advanced practitioner if interventions unsuccessful or patient reports new pain  Outcome: Not Progressing     Problem: INFECTION - ADULT  Goal: Absence or prevention of progression during hospitalization  Description: INTERVENTIONS:  - Assess and monitor for signs and symptoms of infection  - Monitor lab/diagnostic results  - Monitor all insertion sites, i e  indwelling lines, tubes, and drains  - Monitor endotracheal if appropriate and nasal secretions for changes in amount and color  - [place] appropriate cooling/warming therapies per order  - Administer medications as ordered  - Instruct and encourage patient and family to use good hand hygiene technique  - Identify and instruct in appropriate isolation precautions for identified infection/condition  Outcome: Not Progressing  Goal: Absence of fever/infection during neutropenic period  Description: INTERVENTIONS:  - Monitor WBC    Outcome: Not Progressing     Problem: SAFETY ADULT  Goal: Patient will remain free of falls  Description: INTERVENTIONS:  - Educate patient/family on patient safety including physical limitations  - Instruct patient to call for assistance with activity   - Consult OT/PT to assist with strengthening/mobility   - Keep Call bell within reach  - Keep bed low and locked with side rails adjusted as appropriate  - Keep care items and personal belongings within reach  - Initiate and maintain comfort rounds  - Make Fall Risk Sign visible to staff  - Offer Toileting every  Hours, in advance of need  - Initiate/Maintain alarm  - Obtain necessary fall risk management equipment:   - Apply yellow socks and bracelet for high fall risk patients  - Consider moving patient to room near nurses station  Outcome: Not Progressing  Goal: Maintain or return to baseline ADL function  Description: INTERVENTIONS:  -  Assess patient's ability to carry out ADLs; assess patient's baseline for ADL function and identify physical deficits which impact ability to perform ADLs (bathing, care of mouth/teeth, toileting, grooming, dressing, etc )  - Assess/evaluate cause of self-care deficits   - Assess range of motion  - Assess patient's mobility; develop plan if impaired  - Assess patient's need for assistive devices and provide as appropriate  - Encourage maximum independence but intervene and supervise when necessary  - Involve family in performance of ADLs  - Assess for home care needs following discharge   - Consider OT consult to assist with ADL evaluation and planning for discharge  - Provide patient education as appropriate  Outcome: Not Progressing  Goal: Maintains/Returns to pre admission functional level  Description: INTERVENTIONS:  - Perform BMAT or MOVE assessment daily    - Set and communicate daily mobility goal to care team and patient/family/caregiver  - Collaborate with rehabilitation services on mobility goals if consulted  - Perform Range of Motion  times a day  - Reposition patient every  hours    - Dangle patient  times a day  - Stand patient  times a day  - Ambulate patient imes a day  - Out of bed to chair  times a day   - Out of bed for meals  times a day  - Out of bed for toileting  - Record patient progress and toleration of activity level   Outcome: Not Progressing

## 2022-02-21 NOTE — H&P
[address]  H&P- Anika Fowler 1960, 63 y o  female MRN: [id number]  Unit/Bed#: ED 09 Encounter: [id number]  Primary Care Provider: Serg Guzman MD   Date and time admitted to hospital: 2/21/2022  9:05 AM    * Precordial chest pain  Assessment & Plan  Suspected stable angina  DIXON score 3  Patient has typical symptoms with chest pain radiating to her jaw   EKG no significant ischemic change  Troponin x1 negative  Monitor patient under observation with telemetry under ACS protocol  Follow cardiology consult for possible stress test  Patient has risk factor for cardiac complication  As per chart review, patient had stress echo done in 2020, which was normal  Check A1c level, lipid panel    Sleep apnea  Assessment & Plan  Continue CPAP at night    Obesity (BMI 30 0-34  9)  Assessment & Plan  Complicated with obstructive sleep apnea  Follow lipid panel, A1c level  Cardiac diet    Rheumatoid arthritis (HCC)  Assessment & Plan  Continue leflunomide      VTE Pharmacologic Prophylaxis: VTE Score: 2 Moderate Risk (Score 3-4) - Pharmacological DVT Prophylaxis Ordered: heparin  Code Status: Level 1 - Full Code as per patient  Discussion with family: Updated  () at bedside  Anticipated Length of Stay: Patient will be admitted on an observation basis with an anticipated length of stay of less than 2 midnights secondary to To monitor above conditions  Total Time for Visit, including Counseling / Coordination of Care: 45 minutes Greater than 50% of this total time spent on direct patient counseling and coordination of care  Chief Complaint:  Chest pain    History of Present Illness:  Anika Fowler is a 63 y o  female with a PMH of obesity, hyperlipidemia who presents with chest pain  Came with precordial chest pain, started overnight, patient wake up from sleep due to the pain    Pain is located on the precordial area, pressure-like, radiates towards her jaw, associated with mild dizziness and sweating  Denies any nausea, vomiting, diarrhea  Denies any aggravating or elevating factors  Denies any recent URI, denies any recent COVID or COVID vaccine  Denies any previous history of blood clot  Social drinker, does not smoke  Review of Systems:  Review of Systems   Constitutional: Positive for activity change  Negative for appetite change, diaphoresis, fatigue and fever  HENT: Negative for congestion, facial swelling, nosebleeds, postnasal drip, rhinorrhea, sore throat and voice change  Respiratory: Positive for shortness of breath  Negative for apnea, cough, wheezing and stridor  Cardiovascular: Positive for chest pain  Negative for palpitations and leg swelling  Gastrointestinal: Negative for abdominal distention, abdominal pain and anal bleeding  Genitourinary: Negative for difficulty urinating and dyspareunia  Musculoskeletal: Negative for arthralgias and back pain  Skin: Negative for color change and pallor  Neurological: Negative for dizziness, facial asymmetry, light-headedness and headaches  Hematological: Negative for adenopathy  Psychiatric/Behavioral: Negative for agitation and behavioral problems  All other systems reviewed and are negative  Past Medical and Surgical History:   Past Medical History:   Diagnosis Date    Breast cancer ([facility] )     Pt had in left breast- had radiation and surgery    CPAP (continuous positive airway pressure) dependence     Pt uses nightly    GERD (gastroesophageal reflux disease)     H/O cervical fracture     Hyperlipidemia     Irregular heart beat     Pt is taking Zetia   Pt does not have happen anymore per pt since on medication    Rheumatoid arthritis ([place] [address] )     Seasonal allergies     Sleep apnea     Wears glasses        Past Surgical History:   Procedure Laterality Date    BLADDER SUSPENSION      BREAST LUMPECTOMY       SECTION      COLONOSCOPY      EGD      FL RETROGRADE PYELOGRAM  4/8/2021    FOOT SURGERY Right     HYSTERECTOMY      UT CYSTO/URETERO W/LITHOTRIPSY &INDWELL STENT INSRT Left 5/19/2021    Procedure: CYSTOSCOPY URETEROSCOPY WITH LITHOTRIPSY HOLMIUM LASER,  AND INSERTION STENT URETERAL;  Surgeon: Nafisa Parra MD;  [facility];  Service: Urology    UT CYSTOURETHROSCOPY,URETER CATHETER Left 4/8/2021    Procedure: CYSTOSCOPY RETROGRADE PYELOGRAM WITH INSERTION STENT URETERAL;  Surgeon: Dixie Fernandez MD;  [facility];  Service: Urology    SINUS SURGERY      Deviated septum       Meds/Allergies:  Prior to Admission medications    Medication Sig Start Date End Date Taking?  Authorizing Provider   acetaminophen (Tylenol) 325 mg tablet Take 2 tablets (650 mg total) by mouth every 6 (six) hours as needed for mild pain 5/23/21   Juan Jose Marquez MD   Ascorbic Acid (Vitamin C) 500 MG CAPS Take by mouth    Historical Provider, MD   aspirin (ECOTRIN LOW STRENGTH) 81 mg EC tablet Take 81 mg by mouth daily    Historical Provider, MD   Cholecalciferol 50 MCG (2000 UT) CAPS Take 1 capsule by mouth daily     Historical Provider, MD   cyanocobalamin (VITAMIN B-12) 1000 MCG tablet Take by mouth    Historical Provider, MD   Diclofenac Sodium (VOLTAREN) 1 % diclofenac 1 % topical gel   APPLY 2 GRAMS TO THE AFFECTED AREA(S) BY TOPICAL ROUTE 4 TIMES PER DAY    Historical Provider, MD   ezetimibe (ZETIA) 10 mg tablet Take 10 mg by mouth daily  11/7/20 12/9/21  Historical Provider, MD   fluticasone (FLONASE) 50 mcg/act nasal spray 1 spray into each nostril as needed     Historical Provider, MD   HYDROcodone-acetaminophen (NORCO) 5-325 mg per tablet Take 1-2 tablets by mouth every 6 (six) hours as needed for pain for up to 5 dosesMax Daily Amount: 8 tablets 5/19/21   Nafisa Parra MD   leflunomide (ARAVA) 20 MG tablet Take 20 mg by mouth daily    Historical Provider, MD   Magnesium 100 MG CAPS Take by mouth daily     Historical Provider, MD   montelukast (SINGULAIR) 10 mg tablet Take 10 mg by mouth daily    Historical Provider, MD   Multiple Vitamins-Minerals (Multivitamin Gummies Womens) CHEW Chew 1 each daily     Historical Provider, MD   oxybutynin (DITROPAN) 5 mg tablet Take 1 tablet (5 mg total) by mouth 3 (three) times a day as needed (bladder spasm) 21   Carey Welch MD   pantoprazole (PROTONIX) 40 mg tablet Take 40 mg by mouth daily 20   Historical Provider, MD   tamsulosin (FLOMAX) 0 4 mg TAKE ONE CAPSULE BY MOUTH EVERY DAY with dinner 21   Duc Hampton PA-C     I have reviewed home medications with patient personally  Allergies: Allergies   Allergen Reactions    Azathioprine Swelling     Pt reports face gets "puffy", red and face feels hot   Ciprofloxacin Hives     ? Rash      Amoxicillin-Pot Clavulanate Hives    Penicillins Rash       Social History:  Marital Status: /Civil Union   Occupation:  Unknown  Patient Pre-hospital Living Situation: Home  Patient Pre-hospital Level of Mobility: walks  Patient Pre-hospital Diet Restrictions:  Cardiac diet  Substance Use History:   Social History     Substance and Sexual Activity   Alcohol Use Yes     Social History     Tobacco Use   Smoking Status Former Smoker    Quit date:     Years since quittin 1   Smokeless Tobacco Never Used     Social History     Substance and Sexual Activity   Drug Use Not Currently       Family History:  Non contributory    Physical Exam:     Vitals:   Blood Pressure: 139/82 (22 1013)  Pulse: 102 (22 1013)  Temperature: 97 6 °F (36 4 °C) (22 0911)  Temp Source: Temporal (22 09)  Respirations: 18 (22 1013)  Height: 5' 4" (162 6 cm) (22 0911)  Weight - Scale: 92 6 kg (204 lb 2 3 oz) (22 0911)  SpO2: 94 % (22 1013)    Physical Exam  Vitals and nursing note reviewed  Exam conducted with a chaperone present  Constitutional:       Appearance: She is obese  HENT:      Nose: No congestion  Mouth/Throat:      Mouth: Mucous membranes are moist       Pharynx: No oropharyngeal exudate  Eyes:      General: No scleral icterus  Conjunctiva/sclera: Conjunctivae normal       Pupils: Pupils are equal, round, and reactive to light  Cardiovascular:      Rate and Rhythm: Tachycardia present  Heart sounds: No friction rub  No gallop  Pulmonary:      Effort: Pulmonary effort is normal  No respiratory distress  Breath sounds: No stridor  No wheezing or rhonchi  Abdominal:      General: Abdomen is flat  Bowel sounds are normal  There is no distension  Palpations: There is no mass  Tenderness: There is no abdominal tenderness  Hernia: No hernia is present  Musculoskeletal:         General: Normal range of motion  Cervical back: Normal range of motion  Right lower leg: No edema  Lymphadenopathy:      Cervical: No cervical adenopathy  Skin:     General: Skin is warm  Capillary Refill: Capillary refill takes less than 2 seconds  Findings: No lesion  Neurological:      General: No focal deficit present  Mental Status: She is alert and oriented to person, place, and time  Cranial Nerves: No cranial nerve deficit  Sensory: No sensory deficit        Coordination: Coordination normal          Additional Data:     Lab Results:  Results from last 7 days   Lab Units 02/21/22  0917   WBC Thousand/uL 11 38*   HEMOGLOBIN g/dL 12 9   HEMATOCRIT % 39 4   PLATELETS Thousands/uL 281   NEUTROS PCT % 82*   LYMPHS PCT % 8*   MONOS PCT % 8   EOS PCT % 2     Results from last 7 days   Lab Units 02/21/22  0917   SODIUM mmol/L 141   POTASSIUM mmol/L 3 7   CHLORIDE mmol/L 102   CO2 mmol/L 27   BUN mg/dL 12   CREATININE mg/dL 0 94   ANION GAP mmol/L 12   CALCIUM mg/dL 9 3   ALBUMIN g/dL 3 7   TOTAL BILIRUBIN mg/dL 0 47   ALK PHOS U/L 163*   ALT U/L 44   AST U/L 26   GLUCOSE RANDOM mg/dL 123     Results from last 7 days   Lab Units 02/21/22  0917   INR  0 90 Imaging: Reviewed radiology reports from this admission including: chest xray  XR chest 1 view portable   ED Interpretation by Ben Menendez DO (02/21 0724)   NAD      Final Result by Cory Arreguin MD (02/21 6611)      Low lung volumes with mild bibasilar atelectasis  Question trace left effusion  Workstation performed: [id number]             EKG and Other Studies Reviewed on Admission:   · EKG: Sinus tachycardia  ** Please Note: This note has been constructed using a voice recognition system   **

## 2022-02-21 NOTE — CONSULTS
Consultation - Cardiology   Romayne Cecil 63 y o  female MRN: [id number]  Unit/Bed#: ED 09 Encounter: [id number]    Assessment/Plan     Assessment:  Chest pain- worse with laying down, improved with leaning forward, exertional, non ischemic EKG, troponin negative x 2, most consistent with pericarditis and possible costochondritis   RA - on ARAVA  HLD on zetia    Plan:  1  Echo to be done  2  Add crp and sed rate   3  Start colchicine 0 6 mg PO BID  4  Start Ibuprofen 800 mg TID x 7 days and will taper down   5  Would prefer to avoid steroids given risk of recurrence     History of Present Illness   Physician Requesting Consult: Danyel Cole MD  Reason for Consult / Principal Problem: chest pain  HPI: Romayne Cecil is a 63y o  year old female with history of RA who presented for chest pain  She states that she woke up at midnight and got some pain in her chest radiating to the neck as well as her jaw  She took a tylenol which didn't help her symptoms  She states that laying down made her pain worse so she laid in a recliner  Couldn't fall asleep due to the pain  She decided to take tums and it didn't help her symptoms  She states after a few hours she did feel slightly better and was able to sleep for a few hours  When she awoke this AM symptoms were continuing so she came to the ED  Laying flat causes significant pain  If she takes a deep breath her pain is much worse  She has had no recent illnesses  She has no prior issues with cardiac conditions  She does have sleep apnea and is on a cpap  She does also have RA  She reports that she saw cardiology in the past for pain in her chest  She states it felt similar to this episode but not as bad  She had a stress test 1 5 years ago which was normal        Inpatient consult to Cardiology  Consult performed by: Andrea Smith PA-C  Consult ordered by:  Danyel Cole MD          Review of Systems   Constitutional: Negative for chills, fatigue, fever and unexpected weight change  Respiratory: Positive for chest tightness  Negative for shortness of breath and wheezing  Gastrointestinal: Negative for abdominal pain and nausea  Genitourinary: Negative for difficulty urinating  Musculoskeletal: Negative for arthralgias, back pain and gait problem  Skin: Negative for color change, pallor and rash  Neurological: Negative for dizziness, speech difficulty and light-headedness  Psychiatric/Behavioral: Negative for agitation  Historical Information   Past Medical History:   Diagnosis Date    Breast cancer ([facility] )     Pt had in left breast- had radiation and surgery    CPAP (continuous positive airway pressure) dependence     Pt uses nightly    GERD (gastroesophageal reflux disease)     H/O cervical fracture     Hyperlipidemia     Irregular heart beat     Pt is taking Zetia   Pt does not have happen anymore per pt since on medication    Rheumatoid arthritis ([facility] )     Seasonal allergies     Sleep apnea     Wears glasses      Past Surgical History:   Procedure Laterality Date    BLADDER SUSPENSION      BREAST LUMPECTOMY       SECTION      COLONOSCOPY      EGD      FL RETROGRADE PYELOGRAM  2021    FOOT SURGERY Right     HYSTERECTOMY      IL CYSTO/URETERO W/LITHOTRIPSY &INDWELL STENT INSRT Left 2021    Procedure: CYSTOSCOPY URETEROSCOPY WITH LITHOTRIPSY HOLMIUM LASER,  AND INSERTION STENT URETERAL;  Surgeon: Mariaelena Cruz MD;  [facility];  Service: Urology    IL CYSTOURETHROSCOPY,URETER CATHETER Left 2021    Procedure: CYSTOSCOPY RETROGRADE PYELOGRAM WITH INSERTION STENT URETERAL;  Surgeon: Corinne Hardy MD;  [facility];  Service: Urology    SINUS SURGERY      Deviated septum     Social History     Substance and Sexual Activity   Alcohol Use Yes     Social History     Substance and Sexual Activity   Drug Use Not Currently     E-Cigarette/Vaping    E-Cigarette Use Never User E-Cigarette/Vaping Substances     Social History     Tobacco Use   Smoking Status Former Smoker    Quit date:     Years since quittin 1   Smokeless Tobacco Never Used     Family History: non-contributory    Meds/Allergies   all current active meds have been reviewed  Allergies   Allergen Reactions    Azathioprine Swelling     Pt reports face gets "puffy", red and face feels hot   Ciprofloxacin Hives     ? Rash      Amoxicillin-Pot Clavulanate Hives    Penicillins Rash       Objective   Vitals: Blood pressure 124/80, pulse 99, temperature 97 6 °F (36 4 °C), temperature source Temporal, resp  rate 19, height 5' 4" (1 626 m), weight 92 6 kg (204 lb 2 3 oz), SpO2 95 %  Orthostatic Blood Pressures      Most Recent Value   Blood Pressure 124/80 filed at 2022 1115   Patient Position - Orthostatic VS Lying filed at 2022 1115          No intake or output data in the 24 hours ending 22 1232    Invasive Devices  Report    Peripheral Intravenous Line            Peripheral IV 22 Right Antecubital <1 day                Physical Exam  Constitutional:       Appearance: Normal appearance  HENT:      Head: Normocephalic and atraumatic  Cardiovascular:      Rate and Rhythm: Normal rate  Heart sounds: No murmur heard  No gallop  Comments: Palpable pain over the sternum bilaterally, she reports this is a different pain than the reason she came here  Pulmonary:      Effort: Pulmonary effort is normal       Breath sounds: Normal breath sounds  Abdominal:      Palpations: Abdomen is soft  Musculoskeletal:         General: No swelling  Cervical back: Neck supple  Skin:     General: Skin is warm and dry  Capillary Refill: Capillary refill takes less than 2 seconds  Neurological:      General: No focal deficit present  Mental Status: She is alert and oriented to person, place, and time     Psychiatric:         Mood and Affect: Mood normal          Thought Content: Thought content normal          Lab Results:   I have personally reviewed pertinent lab results  CBC with diff:   Results from last 7 days   Lab Units 02/21/22  0917   WBC Thousand/uL 11 38*   RBC Million/uL 4 48   HEMOGLOBIN g/dL 12 9   HEMATOCRIT % 39 4   MCV fL 88   MCH pg 28 8   MCHC g/dL 32 7   RDW % 12 4   MPV fL 9 9   PLATELETS Thousands/uL 281     CMP:   Results from last 7 days   Lab Units 02/21/22  0917   SODIUM mmol/L 141   CHLORIDE mmol/L 102   CO2 mmol/L 27   BUN mg/dL 12   CREATININE mg/dL 0 94   CALCIUM mg/dL 9 3   AST U/L 26   ALT U/L 44   ALK PHOS U/L 163*   EGFR ml/min/1 73sq m 66     HS Troponin:   0   Lab Value Date/Time    HSTNI0 3 02/21/2022 0917    HSTNI2 4 02/21/2022 1109     BNP:   Results from last 7 days   Lab Units 02/21/22  0917   POTASSIUM mmol/L 3 7   CHLORIDE mmol/L 102   CO2 mmol/L 27   BUN mg/dL 12   CREATININE mg/dL 0 94   CALCIUM mg/dL 9 3   EGFR ml/min/1 73sq m 66     Coags:   Results from last 7 days   Lab Units 02/21/22  0917   PTT seconds 29   INR  0 90     TSH:     Magnesium:     Lipid Profile:   Results from last 7 days   Lab Units 02/21/22  0917   HDL mg/dL 67   LDL CALC mg/dL 149*   TRIGLYCERIDES mg/dL 244*     Imaging: I have personally reviewed pertinent reports         EKG: sinus tachycardia on telemetry

## 2022-02-21 NOTE — ED PROVIDER NOTES
History  Chief Complaint   Patient presents with    Chest Pain     pt c/o chest pain radiating to jaw and left shoulderblade w/sob starting last night at midnight  pt took tylenol w/o relief  denies travel/fevers/cough/n/v/d     72-year-old female presents emergency room with chief complaint of chest pressure with radiation to her jaw which began around midnight last evening and awoke her from sleep  Patient denies any pain going into her back or arms  She reports that she has no nausea  No diaphoresis  No shortness of breath  Patient reports that she had similar chest discomfort about a year so ago and did have a negative stress echo in September of 2021  Patient reports however that this is worse than it was in the past     Patient has a history of hyperlipidemia  She denies any history of hypertension or diabetes  No smoking history  Patient also has a history of GERD and rheumatoid arthritis  Patient tried to take some Tylenol for relief of her discomfort she also tried to take some Tums  Neither these were successful in relieving her discomfort  Pain is not reproducible      History provided by:  Patient  Chest Pain  Pain location:  Substernal area  Pain quality: pressure    Pain radiates to:  Neck  Pain severity:  Moderate  Duration:  9 hours  Timing:  Constant  Progression:  Worsening  Chronicity:  New  Relieved by:  Nothing  Ineffective treatments:  Antacids (Tylenol)  Associated symptoms: no abdominal pain, no cough, no dizziness, no fatigue, no fever, no headache, no heartburn, no lower extremity edema, no nausea, no near-syncope, no palpitations, no shortness of breath, not vomiting and no weakness    Risk factors: high cholesterol    Risk factors: no coronary artery disease, no diabetes mellitus, no hypertension and no smoking        Prior to Admission Medications   Prescriptions Last Dose Informant Patient Reported? Taking?    Ascorbic Acid (Vitamin C) 500 MG CAPS 2/20/2022 at Unknown time Yes Yes   Sig: Take by mouth   Cholecalciferol 50 MCG (2000 UT) CAPS 2022 at Unknown time  Yes Yes   Sig: Take 1 capsule by mouth daily    Diclofenac Sodium (VOLTAREN) 1 % Past Month at Unknown time  Yes Yes   Sig: diclofenac 1 % topical gel   APPLY 2 GRAMS TO THE AFFECTED AREA(S) BY TOPICAL ROUTE 4 TIMES PER DAY   HYDROcodone-acetaminophen (NORCO) 5-325 mg per tablet Not Taking at Unknown time  No No   Sig: Take 1-2 tablets by mouth every 6 (six) hours as needed for pain for up to 5 dosesMax Daily Amount: 8 tablets   Patient not taking: Reported on 2022    Magnesium 100 MG CAPS Unknown at Unknown time  Yes No   Sig: Take by mouth daily    Multiple Vitamins-Minerals (Multivitamin Gummies Womens) CHEW 2022 at Unknown time  Yes Yes   Sig: Chew 1 each daily    acetaminophen (Tylenol) 325 mg tablet 2022 at Unknown time  No Yes   Sig: Take 2 tablets (650 mg total) by mouth every 6 (six) hours as needed for mild pain   aspirin (ECOTRIN LOW STRENGTH) 81 mg EC tablet 2022 at Unknown time  Yes Yes   Sig: Take 81 mg by mouth daily   cyanocobalamin (VITAMIN B-12) 1000 MCG tablet 2022 at Unknown time  Yes Yes   Sig: Take by mouth   ezetimibe (ZETIA) 10 mg tablet   Yes No   Sig: Take 10 mg by mouth daily    fluticasone (FLONASE) 50 mcg/act nasal spray 2022 at Unknown time  Yes Yes   Si spray into each nostril as needed    leflunomide (ARAVA) 20 MG tablet 2022 at Unknown time  Yes Yes   Sig: Take 20 mg by mouth daily   montelukast (SINGULAIR) 10 mg tablet 2022 at Unknown time  Yes Yes   Sig: Take 10 mg by mouth daily   oxybutynin (DITROPAN) 5 mg tablet Unknown at Unknown time  No No   Sig: Take 1 tablet (5 mg total) by mouth 3 (three) times a day as needed (bladder spasm)   pantoprazole (PROTONIX) 40 mg tablet 2022 at Unknown time  Yes Yes   Sig: Take 40 mg by mouth daily   tamsulosin (FLOMAX) 0 4 mg 2022 at Unknown time  No Yes   Sig: TAKE ONE CAPSULE BY MOUTH EVERY DAY with dinner      Facility-Administered Medications: None       Past Medical History:   Diagnosis Date    Breast cancer ([facility] )     Pt had in left breast- had radiation and surgery    CPAP (continuous positive airway pressure) dependence     Pt uses nightly    GERD (gastroesophageal reflux disease)     H/O cervical fracture     Hyperlipidemia     Irregular heart beat     Pt is taking Zetia  Pt does not have happen anymore per pt since on medication    [facility] )     Seasonal allergies     Sleep apnea     Wears glasses        Past Surgical History:   Procedure Laterality Date    BLADDER SUSPENSION      BREAST LUMPECTOMY       SECTION      COLONOSCOPY      EGD      FL RETROGRADE PYELOGRAM  2021    FOOT SURGERY Right     HYSTERECTOMY      [place] CYSTO/URETERO W/LITHOTRIPSY &INDWELL STENT INSRT Left 2021    Procedure: CYSTOSCOPY URETEROSCOPY WITH LITHOTRIPSY HOLMIUM LASER,  AND INSERTION STENT URETERAL;  Surgeon: Mili Burden MD;  Location: [facility];  Service: Urology    [place] CYSTOURETHROSCOPY,URETER CATHETER Left 2021    Procedure: CYSTOSCOPY RETROGRADE PYELOGRAM WITH INSERTION STENT URETERAL;  Surgeon: Pierce Petersen MD;  [facility]: [facility];  Service: Urology    SINUS SURGERY      Deviated septum       Family History   Problem Relation Age of Onset    Lung cancer Father     Heart disease Maternal Aunt     Heart disease Maternal Uncle     Kidney cancer Maternal Grandfather     Lung cancer Maternal Grandfather     Heart attack Brother      I have reviewed and agree with the history as documented  E-Cigarette/Vaping    E-Cigarette Use Never User      E-Cigarette/Vaping Substances     Social History     Tobacco Use    Smoking status: Former Smoker     Quit date:      Years since quittin 1    Smokeless tobacco: Never Used   Vaping Use    Vaping Use: Never used   Substance Use Topics    Alcohol use:  Yes    Drug use: Not Currently Review of Systems   Constitutional: Negative for activity change, fatigue and fever  HENT: Negative for congestion, ear pain, rhinorrhea, sinus pressure and sore throat  Eyes: Negative  Respiratory: Positive for chest tightness  Negative for cough, shortness of breath and wheezing  Cardiovascular: Positive for chest pain  Negative for palpitations, leg swelling and near-syncope  Gastrointestinal: Negative  Negative for abdominal pain, diarrhea, heartburn, nausea and vomiting  Endocrine: Negative  Genitourinary: Negative  Skin: Negative  Negative for pallor and rash  Allergic/Immunologic: Negative  Neurological: Negative for dizziness, weakness and headaches  Hematological: Negative  Psychiatric/Behavioral: Negative  All other systems reviewed and are negative  Physical Exam  Physical Exam  Vitals and nursing note reviewed  Constitutional:       General: She is awake  Appearance: Normal appearance  She is well-developed  She is not toxic-appearing  HENT:      Head: Normocephalic and atraumatic  Hair is normal       Jaw: No pain on movement  Right Ear: External ear normal       Left Ear: External ear normal       Nose: Nose normal    Eyes:      General: Lids are normal  No scleral icterus  Extraocular Movements: Extraocular movements intact  Pupils: Pupils are equal, round, and reactive to light  Cardiovascular:      Rate and Rhythm: Normal rate and regular rhythm  Heart sounds: Normal heart sounds  No murmur heard  Pulmonary:      Effort: Pulmonary effort is normal  No respiratory distress  Breath sounds: Normal breath sounds  No stridor  No wheezing or rales  Abdominal:      General: Abdomen is flat  There is no distension  Palpations: Abdomen is soft  Tenderness: There is no abdominal tenderness  There is no guarding  Musculoskeletal:         General: No deformity  Normal range of motion        Cervical back: Normal range of motion and neck supple  Skin:     General: Skin is warm and dry  Coloration: Skin is not jaundiced or pale  Findings: No rash  Neurological:      Mental Status: She is alert and oriented to person, place, and time  Mental status is at baseline  Cranial Nerves: No cranial nerve deficit     Psychiatric:         Attention and Perception: Attention normal          Mood and Affect: Mood normal          Speech: Speech normal          Behavior: Behavior normal          Vital Signs  ED Triage Vitals [02/21/22 0911]   Temperature Pulse Respirations Blood Pressure SpO2   97 6 °F (36 4 °C) 105 22 165/84 97 %      Temp Source Heart Rate Source Patient Position - Orthostatic VS BP Location FiO2 (%)   Temporal Monitor Lying Left arm --      Pain Score       10 - Worst Possible Pain           Vitals:    02/21/22 1330 02/21/22 1400 02/21/22 1530 02/21/22 1617   BP: 113/69 115/63 136/76 134/82   Pulse: 99 101 93 91   Patient Position - Orthostatic VS: Sitting Sitting           Visual Acuity      ED Medications  Medications   acetaminophen (TYLENOL) tablet 650 mg (has no administration in time range)   aspirin (ECOTRIN LOW STRENGTH) EC tablet 81 mg (81 mg Oral Not Given 2/21/22 1102)   ezetimibe (ZETIA) tablet 10 mg (10 mg Oral Given 2/21/22 1153)   fluticasone (FLONASE) 50 mcg/act nasal spray 1 spray (1 spray Nasal Not Given 2/21/22 1104)   HYDROcodone-acetaminophen (NORCO) 5-325 mg per tablet 1 tablet (1 tablet Oral Given 2/21/22 1156)   leflunomide (ARAVA) tablet 20 mg (20 mg Oral Not Given 2/21/22 1151)   magnesium oxide (MAG-OX) tablet 400 mg (400 mg Oral Not Given 2/21/22 1105)   montelukast (SINGULAIR) tablet 10 mg (10 mg Oral Given 2/21/22 1153)   oxybutynin (DITROPAN) tablet 5 mg (has no administration in time range)   pantoprazole (PROTONIX) EC tablet 40 mg (has no administration in time range)   tamsulosin (FLOMAX) capsule 0 4 mg (has no administration in time range)   docusate sodium (COLACE) capsule 100 mg (100 mg Oral Not Given 2/21/22 1105)   senna oral syrup 8 8 mg (8 8 mg Oral Not Given 2/21/22 1105)   ondansetron (ZOFRAN) injection 4 mg (has no administration in time range)   nitroglycerin (NITROSTAT) SL tablet 0 4 mg (has no administration in time range)   morphine injection 2 mg (has no administration in time range)   heparin (porcine) subcutaneous injection 5,000 Units (5,000 Units Subcutaneous Not Given 2/21/22 1308)   ibuprofen (MOTRIN) tablet 800 mg (0 mg Oral Hold 2/21/22 1302)   colchicine (COLCRYS) tablet 0 6 mg (0 6 mg Oral Given 2/21/22 1307)   aspirin chewable tablet 324 mg (324 mg Oral Given 2/21/22 0953)   pantoprazole (PROTONIX) injection 40 mg (40 mg Intravenous Given 2/21/22 0954)       Diagnostic Studies  Results Reviewed     Procedure Component Value Units Date/Time    Ferritin [[id number]]     Lab Status: No result Specimen: Blood     CK (with reflex to MB) [[id number]]     Lab Status: No result Specimen: Blood     C-reactive protein [[id number]]  (Abnormal) Collected: 02/21/22 1253    Lab Status: Final result Specimen: Blood from Arm, Right Updated: 02/21/22 1313     CRP 46 8 mg/L     Sedimentation rate, automated [[id number]]  (Abnormal) Collected: 02/21/22 1253    Lab Status: Final result Specimen: Blood from Arm, Right Updated: 02/21/22 1300     Sed Rate 38 mm/hour     HS Troponin I 2hr [[id number]]  (Normal) Collected: 02/21/22 1109    Lab Status: Final result Specimen: Blood from Arm, Right Updated: 02/21/22 1142     hs TnI 2hr 4 ng/L      Delta 2hr hsTnI 1 ng/L     Lipid Panel with Direct LDL reflex [[id number]]  (Abnormal) Collected: 02/21/22 0917    Lab Status: Final result Specimen: Blood from Arm, Right Updated: 02/21/22 1126     Cholesterol 265 mg/dL      Triglycerides 244 mg/dL      HDL, Direct 67 mg/dL      LDL Calculated 149 mg/dL     Hemoglobin A1C w/ EAG Estimation [[id number]] Collected: 02/21/22 0917    Lab Status:  In process Specimen: Blood from Arm, Right Updated: 02/21/22 1124    HS Troponin 0hr (reflex protocol) [[id number]]  (Normal) Collected: 02/21/22 0917    Lab Status: Final result Specimen: Blood from Arm, Right Updated: 02/21/22 0949     hs TnI 0hr 3 ng/L     Comprehensive metabolic panel [[id number]]  (Abnormal) Collected: 02/21/22 0917    Lab Status: Final result Specimen: Blood from Arm, Right Updated: 02/21/22 0942     Sodium 141 mmol/L      Potassium 3 7 mmol/L      Chloride 102 mmol/L      CO2 27 mmol/L      ANION GAP 12 mmol/L      BUN 12 mg/dL      Creatinine 0 94 mg/dL      Glucose 123 mg/dL      Calcium 9 3 mg/dL      AST 26 U/L      ALT 44 U/L      Alkaline Phosphatase 163 U/L      Total Protein 7 7 g/dL      Albumin 3 7 g/dL      Total Bilirubin 0 47 mg/dL      eGFR 66 ml/min/1 73sq m     Narrative:      [place] guidelines for Chronic Kidney Disease (CKD):     Stage 1 with normal or high GFR (GFR > 90 mL/min/1 73 square meters)    Stage 2 Mild CKD (GFR = 60-89 mL/min/1 73 square meters)    Stage 3A Moderate CKD (GFR = 45-59 mL/min/1 73 square meters)    Stage 3B Moderate CKD (GFR = 30-44 mL/min/1 73 square meters)    Stage 4 Severe CKD (GFR = 15-29 mL/min/1 73 square meters)    Stage 5 End Stage CKD (GFR <15 mL/min/1 73 square meters)  Note: GFR calculation is accurate only with a steady state creatinine    APTT [[id number]]  (Normal) Collected: 02/21/22 0917    Lab Status: Final result Specimen: Blood from Arm, Right Updated: 02/21/22 0937     PTT 29 seconds     Protime-INR [[id number]]  (Normal) Collected: 02/21/22 0917    Lab Status: Final result Specimen: Blood from Arm, Right Updated: 02/21/22 0937     Protime 12 1 seconds      INR 0 90    CBC and differential [[id number]]  (Abnormal) Collected: 02/21/22 0917    Lab Status: Final result Specimen: Blood from Arm, Right Updated: 02/21/22 0924     WBC 11 38 Thousand/uL      RBC 4 48 Million/uL      Hemoglobin 12 9 g/dL      Hematocrit 39 4 %      MCV 88 fL      MCH 28 8 pg MCHC 32 7 g/dL      RDW 12 4 %      MPV 9 9 fL      Platelets 046 Thousands/uL      nRBC 0 /100 WBCs      Neutrophils Relative 82 %      Immat GRANS % 0 %      Lymphocytes Relative 8 %      Monocytes Relative 8 %      Eosinophils Relative 2 %      Basophils Relative 0 %      Neutrophils Absolute 9 19 Thousands/µL      Immature Grans Absolute 0 04 Thousand/uL      Lymphocytes Absolute 0 96 Thousands/µL      Monocytes Absolute 0 91 Thousand/µL      Eosinophils Absolute 0 23 Thousand/µL      Basophils Absolute 0 05 Thousands/µL                  XR chest 1 view portable   ED Interpretation by Avelino Bryant DO (02/21 1835)   NAD      Final Result by Karuna Knight MD (02/21 1910)      Low lung volumes with mild bibasilar atelectasis  Question trace left effusion  Workstation performed: [id number]                    Procedures  ECG 12 Lead Documentation Only    Date/Time: 2/21/2022 9:41 AM  Performed by: Avelino Bryant DO  Authorized by: Avelino Bryant DO     Indications / Diagnosis:  Chest pain  ECG reviewed by me, the ED Provider: yes    Patient location:  ED  Previous ECG:     Previous ECG:  Unavailable (Patient has had previous EKGs in the system    However they were unavailable for 5 review and only in the care everywhere)    Comparison to cardiac monitor: No    Interpretation:     Interpretation: non-specific    Rate:     ECG rate assessment: normal    Rhythm:     Rhythm: sinus rhythm    Ectopy:     Ectopy: none    QRS:     QRS axis:  Normal  Conduction:     Conduction: normal    ST segments:     ST segments:  Non-specific  T waves:     T waves: non-specific               ED Course  ED Course as of 02/21/22 1630   Mon Feb 21, 2022   1141 Had stress one year ago - reports that it was negative    0951 hs TnI 0hr: 3             HEART Risk Score      Most Recent Value   Heart Score Risk Calculator    History 1 Filed at: 02/21/2022 0953   ECG 1 Filed at: 02/21/2022 7234   Age 1 Filed at: 02/21/2022 1498   Risk Factors 1 Filed at: 02/21/2022 0953   Troponin 0 Filed at: 02/21/2022 4283   HEART Score 4 Filed at: 02/21/2022 0238                        SBIRT 20yo+      Most Recent Value   SBIRT (23 yo +)    In order to provide better care to our patients, we are screening all of our patients for alcohol and drug use  Would it be okay to ask you these screening questions? Yes Filed at: 02/21/2022 6791   Initial Alcohol Screen: US AUDIT-C     1  How often do you have a drink containing alcohol? 0 Filed at: 02/21/2022 0922   2  How many drinks containing alcohol do you have on a typical day you are drinking? 0 Filed at: 02/21/2022 0922   3a  Male UNDER 65: How often do you have five or more drinks on one occasion? 0 Filed at: 02/21/2022 0922   3b  FEMALE Any Age, or MALE 65+: How often do you have 4 or more drinks on one occassion? 0 Filed at: 02/21/2022 4441   Audit-C Score 0 Filed at: 02/21/2022 0396   GEORGE: How many times in the past year have you    Used an illegal drug or used a prescription medication for non-medical reasons?  Never Filed at: 02/21/2022 7940        DIXON Risk Score      Most Recent Value   Age >= 72 0 Filed at: 02/21/2022 1100   Known CAD (stenosis >= 50%) 0 Filed at: 02/21/2022 1100   Recent (<=24 hrs) Service Angina 1 Filed at: 02/21/2022 1100   ST Deviation >= 0 5 mm 0 Filed at: 02/21/2022 1100   3+ CAD Risk Factors (FHx, HTN, HLP, DM, Smoker) 1 Filed at: 02/21/2022 1100   Aspirin Use Past 7 Days 1 Filed at: 02/21/2022 1100   Elevated Cardiac Markers 0 Filed at: 02/21/2022 1100   DIXON Risk Score (Calculated) 3 Filed at: 02/21/2022 1100                  MDM  Number of Diagnoses or Management Options  Chest pain: new and requires workup     Amount and/or Complexity of Data Reviewed  Independent visualization of images, tracings, or specimens: yes    Risk of Complications, Morbidity, and/or Mortality  Presenting problems: moderate  Diagnostic procedures: moderate  Management options: moderate    Patient Progress  Patient progress: stable      Disposition  Final diagnoses:   Chest pain     Time reflects when diagnosis was documented in both MDM as applicable and the Disposition within this note     Time User Action Codes Description Comment    2/21/2022  9:42 AM Alexx Power Add [R07 9] Chest pain       ED Disposition     ED Disposition Condition Date/Time Comment    Admit Stable Mon Feb 21, 2022 10:40 AM Case was discussed with Dr Cesar Harmon and the patient's admission status was agreed to be to the service of Dr Cesar Harmon         Follow-up Information    None         Current Discharge Medication List      CONTINUE these medications which have NOT CHANGED    Details   acetaminophen (Tylenol) 325 mg tablet Take 2 tablets (650 mg total) by mouth every 6 (six) hours as needed for mild pain  Refills: 0    Associated Diagnoses: Pyelonephritis of left kidney      Ascorbic Acid (Vitamin C) 500 MG CAPS Take by mouth      aspirin (ECOTRIN LOW STRENGTH) 81 mg EC tablet Take 81 mg by mouth daily      Cholecalciferol 50 MCG (2000 UT) CAPS Take 1 capsule by mouth daily       cyanocobalamin (VITAMIN B-12) 1000 MCG tablet Take by mouth      Diclofenac Sodium (VOLTAREN) 1 % diclofenac 1 % topical gel   APPLY 2 GRAMS TO THE AFFECTED AREA(S) BY TOPICAL ROUTE 4 TIMES PER DAY      fluticasone (FLONASE) 50 mcg/act nasal spray 1 spray into each nostril as needed       leflunomide (ARAVA) 20 MG tablet Take 20 mg by mouth daily      montelukast (SINGULAIR) 10 mg tablet Take 10 mg by mouth daily      Multiple Vitamins-Minerals (Multivitamin Gummies Womens) CHEW Chew 1 each daily       pantoprazole (PROTONIX) 40 mg tablet Take 40 mg by mouth daily      tamsulosin (FLOMAX) 0 4 mg TAKE ONE CAPSULE BY MOUTH EVERY DAY with dinner  Qty: 30 capsule, Refills: 0    Associated Diagnoses: Staghorn calculus      ezetimibe (ZETIA) 10 mg tablet Take 10 mg by mouth daily       HYDROcodone-acetaminophen (NORCO) 5-325 mg per tablet Take 1-2 tablets by mouth every 6 (six) hours as needed for pain for up to 5 dosesMax Daily Amount: 8 tablets  Qty: 5 tablet, Refills: 0    Associated Diagnoses: Staghorn calculus; Ureteral calculus      Magnesium 100 MG CAPS Take by mouth daily       oxybutynin (DITROPAN) 5 mg tablet Take 1 tablet (5 mg total) by mouth 3 (three) times a day as needed (bladder spasm)  Qty: 20 tablet, Refills: 0    Associated Diagnoses: Staghorn calculus; Ureteral calculus             No discharge procedures on file      PDMP Review       Value Time User    PDMP Reviewed  Yes 2/21/2022 10:53 AM Radha Almaraz MD          ED Provider  Electronically Signed by           Marisol Mendoza,   02/21/22 [address] [place] [address],   02/21/22 2067

## 2022-02-21 NOTE — ASSESSMENT & PLAN NOTE
Suspected stable angina  DIXON score 3  Patient has typical symptoms with chest pain radiating to her jaw   EKG no significant ischemic change  Troponin x1 negative  Monitor patient under observation with telemetry under ACS protocol  Follow cardiology consult for possible stress test  Patient has risk factor for cardiac complication  As per chart review, patient had stress echo done in 2020, which was normal  Check A1c level, lipid panel

## 2022-02-22 VITALS
DIASTOLIC BLOOD PRESSURE: 66 MMHG | WEIGHT: 204 LBS | HEIGHT: 64 IN | OXYGEN SATURATION: 94 % | BODY MASS INDEX: 34.83 KG/M2 | SYSTOLIC BLOOD PRESSURE: 115 MMHG | TEMPERATURE: 97.7 F | RESPIRATION RATE: 18 BRPM | HEART RATE: 79 BPM

## 2022-02-22 PROBLEM — I31.9 PERICARDITIS: Status: ACTIVE | Noted: 2022-02-22

## 2022-02-22 LAB
ATRIAL RATE: 102 BPM
ATRIAL RATE: 102 BPM
ATRIAL RATE: 97 BPM
P AXIS: 36 DEGREES
P AXIS: 49 DEGREES
P AXIS: 54 DEGREES
PR INTERVAL: 170 MS
PR INTERVAL: 172 MS
PR INTERVAL: 178 MS
QRS AXIS: 12 DEGREES
QRS AXIS: 49 DEGREES
QRS AXIS: 59 DEGREES
QRSD INTERVAL: 72 MS
QRSD INTERVAL: 72 MS
QRSD INTERVAL: 78 MS
QT INTERVAL: 344 MS
QT INTERVAL: 352 MS
QT INTERVAL: 356 MS
QTC INTERVAL: 447 MS
QTC INTERVAL: 448 MS
QTC INTERVAL: 463 MS
T WAVE AXIS: 36 DEGREES
T WAVE AXIS: 53 DEGREES
T WAVE AXIS: 54 DEGREES
VENTRICULAR RATE: 102 BPM
VENTRICULAR RATE: 102 BPM
VENTRICULAR RATE: 97 BPM

## 2022-02-22 PROCEDURE — 99217 PR OBSERVATION CARE DISCHARGE MANAGEMENT: CPT | Performed by: STUDENT IN AN ORGANIZED HEALTH CARE EDUCATION/TRAINING PROGRAM

## 2022-02-22 RX ORDER — IBUPROFEN 400 MG/1
TABLET ORAL
Qty: 105 TABLET | Refills: 0 | Status: SHIPPED | OUTPATIENT
Start: 2022-02-22 | End: 2022-03-29

## 2022-02-22 RX ORDER — COLCHICINE 0.6 MG/1
0.6 TABLET ORAL 2 TIMES DAILY
Qty: 180 TABLET | Refills: 0 | Status: SHIPPED | OUTPATIENT
Start: 2022-02-22 | End: 2022-05-23

## 2022-02-22 RX ADMIN — FLUTICASONE PROPIONATE 1 SPRAY: 50 SPRAY, METERED NASAL at 08:32

## 2022-02-22 RX ADMIN — LEFLUNOMIDE 20 MG: 20 TABLET, FILM COATED ORAL at 08:32

## 2022-02-22 RX ADMIN — IBUPROFEN 800 MG: 400 TABLET ORAL at 12:33

## 2022-02-22 RX ADMIN — MONTELUKAST 10 MG: 10 TABLET, FILM COATED ORAL at 08:32

## 2022-02-22 RX ADMIN — IBUPROFEN 800 MG: 400 TABLET ORAL at 05:03

## 2022-02-22 RX ADMIN — EZETIMIBE 10 MG: 10 TABLET ORAL at 08:32

## 2022-02-22 RX ADMIN — DOCUSATE SODIUM 100 MG: 100 CAPSULE ORAL at 08:32

## 2022-02-22 RX ADMIN — HEPARIN SODIUM 5000 UNITS: 5000 INJECTION INTRAVENOUS; SUBCUTANEOUS at 05:03

## 2022-02-22 RX ADMIN — ASPIRIN 81 MG: 81 TABLET, COATED ORAL at 08:32

## 2022-02-22 RX ADMIN — COLCHICINE 0.6 MG: 0.6 TABLET ORAL at 08:32

## 2022-02-22 RX ADMIN — PANTOPRAZOLE SODIUM 40 MG: 40 TABLET, DELAYED RELEASE ORAL at 05:03

## 2022-02-22 RX ADMIN — SENNOSIDES 8.8 MG: 8.8 SYRUP ORAL at 08:32

## 2022-02-22 NOTE — DISCHARGE SUMMARY
[address]  Discharge- [facility] Night 1960, 63 y o  female MRN: [id number]  Unit/Bed#: -01 Encounter: [id number]  Primary Care Provider: Naseem Saha MD   Date and time admitted to hospital: 2/21/2022  9:05 AM    * Precordial chest pain  Assessment & Plan  Suspected stable angina  DIXON score 3  Patient has typical symptoms with chest pain radiating to her jaw   EKG no significant ischemic change  Troponin x1 negative  Monitor patient under observation with telemetry under ACS protocol  Follow cardiology consult   Patient has risk factor for cardiac complication  As per chart review, patient had stress echo done in 2020, which was normal  F/u A1c level, lipid panel    Pericarditis  Assessment & Plan  Pericarditis- cp worse with laying down, elevated inflammatory markers, non ischemic EKG, troponin negative, previous normal stress test, improved with colchicine and ibuprofen  Echo showed EF 70%, no pericardial effusion  ESR 38 CRP 47  Cardio recommends discharge on   Continue Ibuprofen 800 mg TID x 7 days  Then ibuprofen 600 mg BID x 14 days  Then ibuprofen 400 mg BID x 7 days  Then Ibuprofen 400 mg daily x 7 days  Continue colchicine 0 6 mg PO BID x 3 months  Pt to f/u with cardio in outpatient setting    Sleep apnea  Assessment & Plan  Continue CPAP at night    Obesity (BMI 30 0-34  9)  Assessment & Plan  Complicated with obstructive sleep apnea  Follow lipid panel, A1c level  Cardiac diet    Rheumatoid arthritis [facility])  Assessment & Plan  Continue leflunomide        Medical Problems             Resolved Problems  Date Reviewed: 2/22/2022    None              Discharging Physician / Practitioner: Eudora Heimlich, DO  PCP: Naseem Saha MD  Admission Date:   Admission Orders (From admission, onward)     Ordered        02/21/22 1040  Place in Observation  Once                      Discharge Date: 02/22/22    Consultations During Hospital Stay:  · Cardiology    Procedures Performed:   · Echo    Significant Findings / Test Results:   · Elevated ESR, CRP    Incidental Findings:   · Pericarditis     Test Results Pending at Discharge (will require follow up):   · none     Outpatient Tests Requested:  · n/a    Complications:  none    Reason for Admission: Chest pain    Hospital Course:   Clementine Manzanares is a 63 y o  female patient who originally presented to the hospital on 2/21/2022 due to chest pain  EKG negative for any significant ischemia  Troponin negative x3  Cardiology was consulted for evaluation  ESR elevated at 37 and CRP elevated at 47  Cardiology associated her symptoms to pericarditis  Echo was done to assess for pericardial effusion which was negative  Patient was started on colchicine and ibuprofen which significantly improved her symptoms  Cardiology recommends discharging her on ibuprofen taper and colchicine 0 6 mg b i d  For 3 months  Patient to follow-up with Cardiology in the outpatient setting  Please see above list of diagnoses and related plan for additional information  Condition at Discharge: good    Discharge Day Visit / Exam:   Subjective:  Patient seen examined at bedside  No acute events overnight  Patient states that chest pain significantly improved  Denies any shortness of breath fevers or chills  Vitals: Blood Pressure: 115/66 (02/22/22 1102)  Pulse: 79 (02/22/22 1102)  Temperature: 97 7 °F (36 5 °C) (02/22/22 1102)  Temp Source: Temporal (02/21/22 0911)  Respirations: 18 (02/22/22 1102)  Height: 5' 4" (162 6 cm) (02/21/22 1400)  Weight - Scale: 92 5 kg (204 lb) (02/21/22 1400)  SpO2: 94 % (02/22/22 1102)  Exam:   Physical Exam  Vitals reviewed  HENT:      Head: Normocephalic and atraumatic  Right Ear: External ear normal       Left Ear: External ear normal       Nose: Nose normal       Mouth/Throat:      Mouth: Mucous membranes are moist       Pharynx: Oropharynx is clear     Eyes:      Extraocular Movements: Extraocular movements intact  Cardiovascular:      Rate and Rhythm: Normal rate and regular rhythm  Pulses: Normal pulses  Heart sounds: Normal heart sounds  Pulmonary:      Effort: Pulmonary effort is normal       Breath sounds: Normal breath sounds  Abdominal:      General: Abdomen is flat  Palpations: Abdomen is soft  Tenderness: There is no abdominal tenderness  Musculoskeletal:         General: Normal range of motion  Cervical back: Normal range of motion  Skin:     General: Skin is warm  Neurological:      General: No focal deficit present  Mental Status: She is alert and oriented to person, place, and time  Psychiatric:         Mood and Affect: Mood normal          Behavior: Behavior normal           Discussion with Family: Patient declined call to   Discharge instructions/Information to patient and family:   See after visit summary for information provided to patient and family  Provisions for Follow-Up Care:  See after visit summary for information related to follow-up care and any pertinent home health orders  Disposition:   Home    Planned Readmission: no     Discharge Statement:  I spent 45 minutes discharging the patient  This time was spent on the day of discharge  I had direct contact with the patient on the day of discharge  Greater than 50% of the total time was spent examining patient, answering all patient questions, arranging and discussing plan of care with patient as well as directly providing post-discharge instructions  Additional time then spent on discharge activities  Discharge Medications:  See after visit summary for reconciled discharge medications provided to patient and/or family        **Please Note: This note may have been constructed using a voice recognition system**

## 2022-02-22 NOTE — DISCHARGE INSTRUCTIONS
Acute Pericarditis   WHAT YOU NEED TO KNOW:   Acute pericarditis is inflammation of the pericardium  The pericardium is the thin sac that surrounds your heart  A small amount of clear fluid between the heart and the sac allows the heart to beat easily  With acute pericarditis, the amount of fluid increases and may contain pus  This can lead to problems with the way that your heart beats  DISCHARGE INSTRUCTIONS:   Seek care immediately if:   · You have shortness of breath that is worse when you lie down  · Your chest pain gets worse or does not get better  Call your doctor if:   · You have a fever  · You have questions or concerns about your condition or care  Medicines: You may need any of the following:  · NSAIDs  help decrease swelling and pain or fever  This medicine is available with or without a doctor's order  NSAIDs can cause stomach bleeding or kidney problems in certain people  If you take blood thinner medicine, always ask your healthcare provider if NSAIDs are safe for you  Always read the medicine label and follow directions  · Antibiotics  help prevent or treat a bacterial infection  · Steroid medicine  helps lower inflammation  · Take your medicine as directed  Contact your healthcare provider if you think your medicine is not helping or if you have side effects  Tell him of her if you are allergic to any medicine  Keep a list of the medicines, vitamins, and herbs you take  Include the amounts, and when and why you take them  Bring the list or the pill bottles to follow-up visits  Carry your medicine list with you in case of an emergency  Self-care:   · Eat a variety of healthy foods  This may help you have more energy and heal faster  Healthy foods include fruits, vegetables, whole-grain breads, low-fat dairy products, beans, lean meat, and fish  Ask if you need to be on a special diet  · Drink liquids as directed    Adults should drink between 9 and 13 eight-ounce cups of liquid every day  Ask what amount is best for you  For most people, good liquids to drink are water, juice, and milk  · Get plenty of exercise  Talk to your healthcare provider about the best exercise plan for you  Exercise can decrease your blood pressure and improve your health  · Do not smoke  Nicotine and other chemicals in cigarettes and cigars can cause lung damage  Ask your healthcare provider for information if you currently smoke and need help to quit  E-cigarettes or smokeless tobacco still contain nicotine  Talk to your healthcare provider before you use these products  · Manage stress  Stress may slow healing and cause illness  Learn new ways to relax, such as deep breathing  Prevent infections: The following can help prevent the spread of viruses and bacteria that can cause acute pericarditis or make it worse:  · Wash your hands often  Wash your hands several times each day  Wash after you use the bathroom, change a child's diaper, and before you prepare or eat food  Use soap and water every time  Rub your soapy hands together, lacing your fingers  Wash the front and back of your hands, and in between your fingers  Use the fingers of one hand to scrub under the fingernails of the other hand  Wash for at least 20 seconds  Rinse with warm, running water for several seconds  Then dry your hands with a clean towel or paper towel  Use hand  that contains alcohol if soap and water are not available  Do not touch your eyes, nose, or mouth without washing your hands first          · Cover a sneeze or cough  Use a tissue that covers your mouth and nose  Throw the tissue away in a trash can right away  Use the bend of your arm if a tissue is not available  Wash your hands well with soap and water or use a hand   · Clean surfaces often  Clean doorknobs, countertops, cell phones, and other surfaces that are touched often   Use a disinfecting wipe, a single-use sponge, or a cloth you can wash and reuse  Use disinfecting  if you do not have wipes  You can create a disinfecting  by mixing 1 part bleach with 10 parts water  · Ask about vaccines you may need  Vaccines help protect against viruses and bacteria that cause certain diseases  Your healthcare provider can tell you which vaccines you may need and when to get them  ? Get the influenza (flu) vaccine as soon as recommended each year  The flu vaccine is usually available starting in September or October  Flu viruses change, so it is important to get a flu vaccine every year  ? Get the pneumonia vaccine if recommended  This vaccine is usually recommended every 5 years  Your provider will tell you when to get this vaccine, if needed  Follow up with your doctor as directed:  Write down your questions so you remember to ask them during your visits  © Copyright [employer] 2021 Information is for End User's use only and may not be sold, redistributed or otherwise used for commercial purposes  All illustrations and images included in CareNotes® are the copyrighted property of [employer]  or Thomas Marrufo   The above information is an  only  It is not intended as medical advice for individual conditions or treatments  Talk to your doctor, nurse or pharmacist before following any medical regimen to see if it is safe and effective for you

## 2022-02-22 NOTE — ASSESSMENT & PLAN NOTE
Suspected stable angina  DIXON score 3  Patient has typical symptoms with chest pain radiating to her jaw   EKG no significant ischemic change  Troponin x1 negative  Monitor patient under observation with telemetry under ACS protocol  Follow cardiology consult   Patient has risk factor for cardiac complication  As per chart review, patient had stress echo done in 2020, which was normal  F/u A1c level, lipid panel

## 2022-02-22 NOTE — NURSING NOTE
Discharge instructions reviewed with patient  Patient verbalized understanding of same  Patient agrees to follow up with PCP and Cardiology outpatient

## 2022-02-22 NOTE — ASSESSMENT & PLAN NOTE
Pericarditis- cp worse with laying down, elevated inflammatory markers, non ischemic EKG, troponin negative, previous normal stress test, improved with colchicine and ibuprofen  Echo showed EF 70%, no pericardial effusion  ESR 38 CRP 47  Cardio recommends discharge on   Continue Ibuprofen 800 mg TID x 7 days  Then ibuprofen 600 mg BID x 14 days  Then ibuprofen 400 mg BID x 7 days  Then Ibuprofen 400 mg daily x 7 days  Continue colchicine 0 6 mg PO BID x 3 months  Pt to f/u with cardio in outpatient setting

## 2022-02-22 NOTE — PLAN OF CARE
Problem: PAIN - ADULT  Goal: Verbalizes/displays adequate comfort level or baseline comfort level  Description: Interventions:  - Encourage patient to monitor pain and request assistance  - Assess pain using appropriate pain scale  - Administer analgesics based on type and severity of pain and evaluate response  - Implement non-pharmacological measures as appropriate and evaluate response  - Consider cultural and social influences on pain and pain management  - Notify physician/advanced practitioner if interventions unsuccessful or patient reports new pain  2/22/2022 0917 by Liam Pineda RN  Outcome: Progressing  2/22/2022 0916 by Liam Pineda RN  Outcome: Progressing

## 2022-02-22 NOTE — PROGRESS NOTES
Progress Note - Cardiology   Terence Johnson 63 y o  female MRN: [id number]  Unit/Bed#: -01 Encounter: [id number]    Assessment:  Pericarditis- cp worse with laying down, elevated inflammatory markers, non ischemic EKG, troponin negative, previous normal stress test, improved with colchicine and ibuprofen  RA on ARAVA  HLD on zetia    Plan:  Continue Ibuprofen 800 mg TID x 7 days  Then ibuprofen 600 mg BID x 14 days  Then ibuprofen 400 mg BID x 7 days  Then Ibuprofen 400 mg daily x 7 days  Continue colchicine 0 6 mg PO BID x 3 months  Will coordinate follow up in our office in the next few weeks  Should her symptoms recur she may require use of prednisone given autoimmune history but will try to avoid given risk of recurrence     Subjective/Objective     Subjective: pt reports significant improvement in symptoms since we last saw her yesterday  Tolerating her medications well  She still notes slight pain in chest with taking a deep breath  Objective: no events overnight  Vitals: /63   Pulse 76   Temp 97 8 °F (36 6 °C)   Resp 18   Ht 5' 4" (1 626 m)   Wt 92 5 kg (204 lb)   SpO2 95%   BMI 35 02 kg/m²   Vitals:    02/21/22 0911 02/21/22 1400   Weight: 92 6 kg (204 lb 2 3 oz) 92 5 kg (204 lb)     Orthostatic Blood Pressures      Most Recent Value   Blood Pressure 111/63 filed at 02/22/2022 0701   Patient Position - Orthostatic VS Sitting filed at 02/21/2022 1846            Intake/Output Summary (Last 24 hours) at 2/22/2022 8958  Last data filed at 2/22/2022 4467  Gross per 24 hour   Intake 240 ml   Output --   Net 240 ml       Invasive Devices  Report    Peripheral Intravenous Line            Peripheral IV 02/21/22 Right Antecubital 1 day                    Physical Exam  Constitutional:       Appearance: Normal appearance  HENT:      Head: Normocephalic and atraumatic  Cardiovascular:      Rate and Rhythm: Normal rate  Heart sounds: No murmur heard  No gallop         Comments: Palpable pain over the sternum bilaterally, she reports this is a different pain than the reason she came here  Pulmonary:      Effort: Pulmonary effort is normal       Breath sounds: Normal breath sounds  Abdominal:      Palpations: Abdomen is soft  Musculoskeletal:         General: No swelling  Cervical back: Neck supple  Skin:     General: Skin is warm and dry  Capillary Refill: Capillary refill takes less than 2 seconds  Neurological:      General: No focal deficit present  Mental Status: She is alert and oriented to person, place, and time  Psychiatric:         Mood and Affect: Mood normal          Thought Content: Thought content normal         Lab Results:   I have personally reviewed pertinent lab results  CBC with diff:   Results from last 7 days   Lab Units 02/21/22  0917   WBC Thousand/uL 11 38*   RBC Million/uL 4 48   HEMOGLOBIN g/dL 12 9   HEMATOCRIT % 39 4   MCV fL 88   MCH pg 28 8   MCHC g/dL 32 7   RDW % 12 4   MPV fL 9 9   PLATELETS Thousands/uL 281     CMP:   Results from last 7 days   Lab Units 02/21/22  0917   SODIUM mmol/L 141   CHLORIDE mmol/L 102   CO2 mmol/L 27   BUN mg/dL 12   CREATININE mg/dL 0 94   CALCIUM mg/dL 9 3   AST U/L 26   ALT U/L 44   ALK PHOS U/L 163*   EGFR ml/min/1 73sq m 66     HS Troponin:   0   Lab Value Date/Time    HSTNI 6 (L) 02/21/2022 1927    HSTNI0 3 02/21/2022 0917    HSTNI2 4 02/21/2022 1109     BNP:   Results from last 7 days   Lab Units 02/21/22  0917   POTASSIUM mmol/L 3 7   CHLORIDE mmol/L 102   CO2 mmol/L 27   BUN mg/dL 12   CREATININE mg/dL 0 94   CALCIUM mg/dL 9 3   EGFR ml/min/1 73sq m 66     Coags:   Results from last 7 days   Lab Units 02/21/22  0917   PTT seconds 29   INR  0 90     TSH:     Magnesium:     Lipid Profile:   Results from last 7 days   Lab Units 02/21/22  0917   HDL mg/dL 67   LDL CALC mg/dL 149*   TRIGLYCERIDES mg/dL 244*       Results for Ángela Farias (MRN [id number]) as of 2/22/2022 10:01   Ref   Range 2/21/2022 12:53   Sed Rate Latest Ref Range: 0 - 29 mm/hour 38 (H)   C-REACTIVE PROTEIN Latest Ref Range: <3 0 mg/L 46 8 (H)     Imaging: I have personally reviewed pertinent reports  Echo 2/21/22:  Left Ventricle Left ventricular cavity size is normal  Wall thickness is normal  The left ventricular ejection fraction is 70%  Systolic function is hyperdynamic  Wall motion is normal  Diastolic function is mildly abnormal, consistent with grade I (abnormal) relaxation  Right Ventricle Right ventricular cavity size is normal  Systolic function is normal    Left Atrium The atrium is normal in size  Right Atrium The atrium is normal in size  Aortic Valve The aortic valve is trileaflet  There is no evidence of regurgitation  There is no evidence of stenosis  Mitral Valve The mitral valve has normal structure  There is trace regurgitation  There is no evidence of stenosis  Tricuspid Valve Tricuspid valve structure is normal  There is no evidence of regurgitation  There is no evidence of stenosis  Pulmonic Valve The pulmonic valve was not well visualized  There is no evidence of regurgitation  There is no evidence of stenosis  Ascending Aorta The ascending aorta is normal in size  IVC/SVC The inferior vena cava is normal in size  Respirophasic changes were normal    Pericardium There is no pericardial effusion         EKG: Sinus tachycardia  Otherwise normal ECG  When compared with ECG of 21-FEB-2022 11:07, (unconfirmed)  No significant change was found  Confirmed by Lukas Blackman ([id number]) on 2/22/2022 8:20:37 AM

## 2022-02-22 NOTE — PLAN OF CARE
Problem: PAIN - ADULT  Goal: Verbalizes/displays adequate comfort level or baseline comfort level  Description: Interventions:  - Encourage patient to monitor pain and request assistance  - Assess pain using appropriate pain scale  - Administer analgesics based on type and severity of pain and evaluate response  - Implement non-pharmacological measures as appropriate and evaluate response  - Consider cultural and social influences on pain and pain management  - Notify physician/advanced practitioner if interventions unsuccessful or patient reports new pain  Outcome: Progressing     Problem: SAFETY ADULT  Goal: Patient will remain free of falls  Description: INTERVENTIONS:  - Educate patient/family on patient safety including physical limitations  - Instruct patient to call for assistance with activity   - Consult OT/PT to assist with strengthening/mobility   - Keep Call bell within reach  - Keep bed low and locked with side rails adjusted as appropriate  - Keep care items and personal belongings within reach  - Initiate and maintain comfort rounds  - Make Fall Risk Sign visible to staff  - Offer Toileting every  Hours, in advance of need  - Initiate/Maintain alarm  - Obtain necessary fall risk management equipment:   - Apply yellow socks and bracelet for high fall risk patients  - Consider moving patient to room near nurses station  Outcome: Progressing

## 2022-02-22 NOTE — UTILIZATION REVIEW
Initial Clinical Review    Admission: Date/Time/Statement:   Admission Orders (From admission, onward)     Ordered        02/21/22 1040  Place in Observation  Once                      Orders Placed This Encounter   Procedures    Place in Observation     Standing Status:   Standing     Number of Occurrences:   1     Order Specific Question:   Level of Care     Answer:   Med Surg [16]     ED Arrival Information     Expected Arrival Acuity    - 2/21/2022 09:01 Emergent         Means of arrival Escorted by Service Admission type    [facility] Emergency         Arrival complaint    chest pain, back pain, jaw pain        Chief Complaint   Patient presents with    Chest Pain     pt c/o chest pain radiating to jaw and left shoulderblade w/sob starting last night at midnight  pt took tylenol w/o relief  denies travel/fevers/cough/n/v/d       Initial Presentation: 63 y  o female W/PMHX:obesity, HLD, PEARL CPAP at HS, RA c/w home [place], to ED from home, admitted as Observation due to Precordial chest pain  Presented with suspected angina precordial chest pain, started overnight , woke pt from sleep, presssure like, radiates towards jaw, associated with mild dizziness, and diaphoresis  Exam obese, tachycardia, work up shows leukocytosis, elevated sed rate, crp, cxr low lung volumes, trace left effusion, ekg ST,   Plan includes trend serial labs repletion in progress as needed, telemetry, cardiology consult, DVT prophy             ED Triage Vitals [02/21/22 0911]   Temperature Pulse Respirations Blood Pressure SpO2   97 6 °F (36 4 °C) 105 22 165/84 97 %      Temp Source Heart Rate Source Patient Position - Orthostatic VS BP Location FiO2 (%)   Temporal Monitor Lying Left arm --      Pain Score       10 - Worst Possible Pain          Wt Readings from Last 1 Encounters:   02/21/22 92 5 kg (204 lb)     Additional Vital Signs:   /22/22 0900 -- -- -- -- -- -- None (Room air) -- --   02/22/22 07:01:31 97 8 °F (36 6 °C) 76 18 111/63 79 95 % -- -- --   02/22/22 03:07:17 97 7 °F (36 5 °C) 78 18 112/62 79 95 % -- -- --   02/21/22 2244 -- -- -- -- -- -- -- Nasal mask --   02/21/22 21:47:45 98 8 °F (37 1 °C) 98 17 128/78 95 93 % -- -- --   02/21/22 1934 -- -- -- -- -- 95 % None (Room air) -- --   02/21/22 19:27:55 -- 101 -- 130/78 95 94 % -- -- --   02/21/22 1846 -- 112 Abnormal  18 135/85 -- -- -- -- Sitting   02/21/22 16:17:44 98 5 °F (36 9 °C) 91 18 134/82 99 95 % -- -- --   02/21/22 1530 -- 93 22 136/76 99 95 % -- -- --   02/21/22 1400 -- 101 22 115/63 84 93 % None (Room air) -- Sitting   02/21/22 1330 -- 99 18 113/69 85 95 % None (Room air) -- Sitting   02/21/22 1230 -- 101 18 119/63 83 93 % None (Room air) -- Sitting   02/21/22 1115 -- 99 19 124/80 98 95 % None (Room air) -- Lying   02/21/22 1013 -- 102 18 139/82 -- 94 % None (Room air) -- Sitting   02/21/22 0922 -- -- -- -- -- -- None (Room air) --        Pertinent Labs/Diagnostic Test Results:   XR chest 1 view portable   ED Interpretation by Avelino Bryant DO (02/21 1390)   NAD      Final Result by Karuna Knight MD (02/21 5825)      Low lung volumes with mild bibasilar atelectasis  Question trace left effusion     2/21/22 EKG: Sinus tachycardia  Low voltage QRS  Borderline ECG  No previous ECGs available  2/21/22 EKG:Sinus tachycardia  Otherwise normal ECG  When compared with ECG of 21-FEB-2022 11:07, (unconfirmed)  No significant change was found  2/21/22 ECHO: EF 70%      Results from last 7 days   Lab Units 02/21/22  0917   WBC Thousand/uL 11 38*   HEMOGLOBIN g/dL 12 9   HEMATOCRIT % 39 4   PLATELETS Thousands/uL 281   NEUTROS ABS Thousands/µL 9 19*         Results from last 7 days   Lab Units 02/21/22  0917   SODIUM mmol/L 141   POTASSIUM mmol/L 3 7   CHLORIDE mmol/L 102   CO2 mmol/L 27   ANION GAP mmol/L 12   BUN mg/dL 12   CREATININE mg/dL 0 94   EGFR ml/min/1 73sq m 66   CALCIUM mg/dL 9 3     Results from last 7 days   Lab Units 02/21/22  0917 AST U/L 26   ALT U/L 44   ALK PHOS U/L 163*   TOTAL PROTEIN g/dL 7 7   ALBUMIN g/dL 3 7   TOTAL BILIRUBIN mg/dL 0 47         Results from last 7 days   Lab Units 02/21/22  0917   GLUCOSE RANDOM mg/dL 123         Results from last 7 days   Lab Units 02/21/22  0917   HEMOGLOBIN A1C % 5 5   EAG mg/dl 111       Results from last 7 days   Lab Units 02/21/22  1253   CK TOTAL U/L 66     Results from last 7 days   Lab Units 02/21/22  1109 02/21/22  0917   HS TNI 0HR ng/L  --  3   HS TNI 2HR ng/L 4  --    HSTNI D2 ng/L 1  --          Results from last 7 days   Lab Units 02/21/22  0917   PROTIME seconds 12 1   INR  0 90   PTT seconds 29       Results from last 7 days   Lab Units 02/21/22  1253   FERRITIN ng/mL 151             Results from last 7 days   Lab Units 02/21/22  1253   CRP mg/L 46 8*   SED RATE mm/hour 38*     ED Treatment:   Medication Administration from 02/21/2022 0901 to 02/21/2022 1611       Date/Time Order Dose Route Action     02/21/2022 1014 nitroglycerin (NITROSTAT) SL tablet 0 4 mg 0 4 mg Sublingual Given     02/21/2022 0953 nitroglycerin (NITROSTAT) SL tablet 0 4 mg 0 4 mg Sublingual Given     02/21/2022 4635 aspirin chewable tablet 324 mg 324 mg Oral Given     02/21/2022 0954 pantoprazole (PROTONIX) injection 40 mg 40 mg Intravenous Given     02/21/2022 1153 ezetimibe (ZETIA) tablet 10 mg 10 mg Oral Given     02/21/2022 1156 HYDROcodone-acetaminophen (NORCO) 5-325 mg per tablet 1 tablet 1 tablet Oral Given     02/21/2022 1153 montelukast (SINGULAIR) tablet 10 mg 10 mg Oral Given     02/21/2022 1307 colchicine (COLCRYS) tablet 0 6 mg 0 6 mg Oral Given        Past Medical History:   Diagnosis Date    Breast cancer ([facility] [address] ) 1998    Pt had in left breast- had radiation and surgery    CPAP (continuous positive airway pressure) dependence     Pt uses nightly    GERD (gastroesophageal reflux disease)     H/O cervical fracture     Hyperlipidemia     Irregular heart beat     Pt is taking Zetia   Pt does not have happen anymore per pt since on medication    Rheumatoid arthritis ([address] )     Seasonal allergies     Sleep apnea     Wears glasses      Present on Admission:   Sleep apnea   Obesity (BMI 30 0-34  9)   Rheumatoid arthritis (HCC)   Precordial chest pain      Admitting Diagnosis: Chest pain [R07 9]  Age/Sex: 63 y o  female  Admission Orders:Telm, scd, up and oob,   Scheduled Medications:  aspirin, 81 mg, Oral, Daily  colchicine, 0 6 mg, Oral, BID  docusate sodium, 100 mg, Oral, BID  ezetimibe, 10 mg, Oral, Daily  fluticasone, 1 spray, Nasal, Daily  heparin (porcine), 5,000 Units, Subcutaneous, Q8H CAROL  ibuprofen, 800 mg, Oral, Q8H  leflunomide, 20 mg, Oral, Daily  magnesium oxide, 400 mg, Oral, Once  montelukast, 10 mg, Oral, Daily  pantoprazole, 40 mg, Oral, Early Morning  senna, 8 8 mg, Oral, Daily  tamsulosin, 0 4 mg, Oral, Daily With Dinner      Continuous IV Infusions:none     PRN Meds:  acetaminophen, 650 mg, Oral, Q6H PRN  HYDROcodone-acetaminophen, 1 tablet, Oral, Q6H PRN 2/21 x2  morphine injection, 2 mg, Intravenous, Q4H PRN 2/21 x1  nitroglycerin, 0 4 mg, Sublingual, Q5 Min PRN 2/21 x3  ondansetron, 4 mg, Intravenous, Q6H PRN  oxybutynin, 5 mg, Oral, TID PRN        IP CONSULT TO CARDIOLOGY    Network Utilization Review Department  ATTENTION: Please call with any questions or concerns to [phone] and carefully listen to the prompts so that you are directed to the right person  All voicemails are confidential   [facility] all requests for admission clinical reviews, approved or denied determinations and any other requests to dedicated fax number below belonging to the campus where the patient is receiving treatment   List of dedicated fax numbers for the Facilities:  [address] DENIALS (Administrative/Medical Necessity) [phone]   [address] (Maternity/NICU/Pediatrics) [phone]   5000 [facility] [place] [facility] [phone]   8049 [facility] [phone]   [address] [facility] [place] [address] [facility] [address] [address] [place] [address] [address] [phone]   [address] [phone]

## 2022-02-22 NOTE — CASE MANAGEMENT
Case Management Assessment & Discharge Planning Note    Patient name Mary Anne Torres  Location [address] [id number] MRN [id number]  : 1960 Date 2022       Current Admission Date: 2022  Current Admission Diagnosis:Precordial chest pain   Patient Active Problem List    Diagnosis Date Noted    Precordial chest pain 2022    Sleep apnea     History of hyperlipidemia 2021    Obesity (BMI 30 0-34 9) 2021    Hydronephrosis of left kidney 2021    Rheumatoid arthritis ([facility] [address] ) 2021    Gastroesophageal reflux disease without esophagitis 2020    Malignant neoplasm of female breast ([facility] ) 2019      LOS (days): 0  Geometric Mean LOS (GMLOS) (days):   Days to GMLOS:     OBJECTIVE:              Current admission status: Observation       Preferred Pharmacy:   [address] [place] [address]  Phone: [phone] Fax: [phone]    Primary Care Provider: Tom Macdonald MD    Primary Insurance: BLUE CROSS  Secondary Insurance:     ASSESSMENT:  Active Health Care Agents    There are no active Health Care Agents on file  Readmission Root Cause  30 Day Readmission: No    Patient Information  Admitted from[de-identified] Home  Mental Status: Alert  During Assessment patient was accompanied by: Not accompanied during assessment  Assessment information provided by[de-identified] Patient  Primary Caregiver: Self  Support Systems: Self,Spouse/significant other,Daughter,Son  [place] of Residence: [address] do you live in?: [facility] entry access options   Select all that apply : Stairs  Number of steps to enter home : 4  Do the steps have railings?: No  Type of Current Residence: [facility]  In the last 12 months, was there a time when you were not able to pay the mortgage or rent on time?: No  In the last 12 months, how many places have you lived?: 1  In the last 12 months, was there a time when you did not have a steady place to sleep or slept in a shelter (including now)?: No  Homeless/housing insecurity resource given?: N/A  Living Arrangements: Lives w/ Spouse/significant other  Is patient a ?: No    Activities of Daily Living Prior to Admission  Functional Status: Independent  Completes ADLs independently?: Yes  Ambulates independently?: Yes  Does patient use assisted devices?: No  Does patient currently own DME?: No  Does patient have a history of Outpatient Therapy (PT/OT)?: No  Does the patient have a history of Short-Term Rehab?: No  Does patient have a history of HHC?: No  Does patient currently have [facility]?: No         Patient Information Continued  Income Source: Employed  Does patient have prescription coverage?: Yes  Within the past 12 months, you worried that your food would run out before you got the money to buy more : Never true  Within the past 12 months, the food you bought just didnt last and you didnt have money to get more : Never true  Food insecurity resource given?: N/A  Does patient receive dialysis treatments?: No  Does patient have a history of substance abuse?: No  Does patient have a history of Mental Health Diagnosis?: No         Means of Transportation  Means of Transport to Appts[de-identified] Drives Self  In the past 12 months, has lack of transportation kept you from medical appointments or from getting medications?: No  In the past 12 months, has lack of transportation kept you from meetings, work, or from getting things needed for daily living?: No  Was application for public transport provided?: N/A        DISCHARGE DETAILS:    Discharge planning discussed with[de-identified] patient        CM contacted family/caregiver?: No- see comments (patient alert and oriented)  Were Treatment Team discharge recommendations reviewed with patient/caregiver?: Yes  Did patient/caregiver verbalize understanding of patient care needs?: Yes  Were patient/caregiver advised of the risks associated with not following Treatment Team discharge recommendations?: Yes         [address]         Is the patient interested in [facility] at discharge?: No    DME Referral Provided  Referral made for DME?: No      Treatment Team Recommendation: Home  Discharge Destination Plan[de-identified] Home  Transport at Discharge : Family       CM met with patient for assessment  Patient being discharged today with no CM referrals necessary for discharge

## 2022-02-22 NOTE — PLAN OF CARE
Problem: PAIN - ADULT  Goal: Verbalizes/displays adequate comfort level or baseline comfort level  Description: Interventions:  - Encourage patient to monitor pain and request assistance  - Assess pain using appropriate pain scale  - Administer analgesics based on type and severity of pain and evaluate response  - Implement non-pharmacological measures as appropriate and evaluate response  - Consider cultural and social influences on pain and pain management  - Notify physician/advanced practitioner if interventions unsuccessful or patient reports new pain  2/22/2022 1203 by Ceferino Cowart RN  Outcome: Adequate for Discharge  2/22/2022 0917 by Ceferino Cowart RN  Outcome: Progressing  2/22/2022 0916 by Ceferino Cowart RN  Outcome: Progressing

## 2022-11-15 ENCOUNTER — DOCTOR'S OFFICE (OUTPATIENT)
Dept: URBAN - NONMETROPOLITAN AREA CLINIC 1 | Facility: CLINIC | Age: 62
Setting detail: OPHTHALMOLOGY
End: 2022-11-15
Payer: COMMERCIAL

## 2022-11-15 ENCOUNTER — OPTICAL OFFICE (OUTPATIENT)
Dept: URBAN - NONMETROPOLITAN AREA CLINIC 4 | Facility: CLINIC | Age: 62
Setting detail: OPHTHALMOLOGY
End: 2022-11-15
Payer: COMMERCIAL

## 2022-11-15 DIAGNOSIS — H52.223: ICD-10-CM

## 2022-11-15 DIAGNOSIS — H21.233: ICD-10-CM

## 2022-11-15 PROCEDURE — 92014 COMPRE OPH EXAM EST PT 1/>: CPT | Performed by: OPTOMETRIST

## 2022-11-15 PROCEDURE — 92132 CPTRZD OPH DX IMG ANT SGM: CPT | Performed by: OPTOMETRIST

## 2022-11-15 PROCEDURE — V2020 VISION SVCS FRAMES PURCHASES: HCPCS | Performed by: OPTOMETRIST

## 2022-11-15 PROCEDURE — V2025 EYEGLASSES DELUX FRAMES: HCPCS | Performed by: OPTOMETRIST

## 2022-11-15 PROCEDURE — V2203 LENS SPHCYL BIFOCAL 4.00D/.1: HCPCS | Performed by: OPTOMETRIST

## 2022-11-15 PROCEDURE — V2750 ANTI-REFLECTIVE COATING: HCPCS | Performed by: OPTOMETRIST

## 2022-11-15 PROCEDURE — V2781 PROGRESSIVE LENS PER LENS: HCPCS | Performed by: OPTOMETRIST

## 2022-11-15 ASSESSMENT — VISUAL ACUITY
OD_BCVA: 20/30-1
OS_BCVA: 20/20-1

## 2022-11-15 ASSESSMENT — REFRACTION_CURRENTRX
OD_ADD: +2.25
OS_CYLINDER: -1.50
OS_ADD: +2.25
OS_VPRISM_DIRECTION: PROGS
OD_VPRISM_DIRECTION: PROGS
OS_SPHERE: +0.50
OS_OVR_VA: 20/
OD_AXIS: 095
OD_OVR_VA: 20/
OD_SPHERE: +0.75
OD_CYLINDER: -1.75
OS_AXIS: 076

## 2022-11-15 ASSESSMENT — REFRACTION_MANIFEST
OD_ADD: +2.50
OD_AXIS: 095
OS_SPHERE: +1.00
OS_VA2: 20/20
OD_VA1: 20/20
OS_ADD: +2.50
OD_CYLINDER: -1.50
OD_SPHERE: +1.00
OD_VA2: 20/20
OS_AXIS: 075
OS_CYLINDER: -1.50
OS_VA1: 20/20

## 2022-11-15 ASSESSMENT — REFRACTION_AUTOREFRACTION
OD_SPHERE: +1.00
OS_SPHERE: +1.25
OD_CYLINDER: -1.50
OD_AXIS: 083
OS_AXIS: 068
OS_CYLINDER: -1.75

## 2022-11-15 ASSESSMENT — TONOMETRY
OD_IOP_MMHG: 16
OS_IOP_MMHG: 16

## 2022-11-15 ASSESSMENT — SPHEQUIV_DERIVED
OD_SPHEQUIV: 0.25
OD_SPHEQUIV: 0.25
OS_SPHEQUIV: 0.375
OS_SPHEQUIV: 0.25

## 2022-11-15 ASSESSMENT — CONFRONTATIONAL VISUAL FIELD TEST (CVF)
OS_FINDINGS: FULL
OD_FINDINGS: FULL

## 2022-11-28 ENCOUNTER — HOSPITAL ENCOUNTER (EMERGENCY)
Facility: HOSPITAL | Age: 62
Discharge: HOME | End: 2022-11-28
Attending: EMERGENCY MEDICINE

## 2022-11-28 ENCOUNTER — APPOINTMENT (EMERGENCY)
Dept: CT IMAGING | Facility: HOSPITAL | Age: 62
End: 2022-11-28

## 2022-11-28 VITALS
HEART RATE: 98 BPM | DIASTOLIC BLOOD PRESSURE: 78 MMHG | TEMPERATURE: 98 F | OXYGEN SATURATION: 96 % | SYSTOLIC BLOOD PRESSURE: 126 MMHG | WEIGHT: 206.79 LBS | BODY MASS INDEX: 35.5 KG/M2 | RESPIRATION RATE: 18 BRPM

## 2022-11-28 DIAGNOSIS — R10.13 EPIGASTRIC PAIN: Primary | ICD-10-CM

## 2022-11-28 DIAGNOSIS — Z96.0 RETAINED URETERAL STENT: ICD-10-CM

## 2022-11-28 DIAGNOSIS — K85.90 PANCREATITIS: ICD-10-CM

## 2022-11-28 LAB
2HR DELTA HS TROPONIN: >0 NG/L
ALBUMIN SERPL BCP-MCNC: 3.6 G/DL (ref 3.5–5)
ALP SERPL-CCNC: 146 U/L (ref 46–116)
ALT SERPL W P-5'-P-CCNC: 35 U/L (ref 12–78)
ANION GAP SERPL CALCULATED.3IONS-SCNC: 6 MMOL/L (ref 4–13)
APTT PPP: 28 SECONDS (ref 23–37)
AST SERPL W P-5'-P-CCNC: 21 U/L (ref 5–45)
BASOPHILS # BLD AUTO: 0.06 THOUSANDS/ÂΜL (ref 0–0.1)
BASOPHILS NFR BLD AUTO: 1 % (ref 0–1)
BILIRUB SERPL-MCNC: 0.35 MG/DL (ref 0.2–1)
BUN SERPL-MCNC: 16 MG/DL (ref 5–25)
CALCIUM SERPL-MCNC: 9.4 MG/DL (ref 8.3–10.1)
CARDIAC TROPONIN I PNL SERPL HS: 2 NG/L
CARDIAC TROPONIN I PNL SERPL HS: <2 NG/L
CHLORIDE SERPL-SCNC: 102 MMOL/L (ref 96–108)
CO2 SERPL-SCNC: 28 MMOL/L (ref 21–32)
CREAT SERPL-MCNC: 0.98 MG/DL (ref 0.6–1.3)
CRP SERPL QL: 22.8 MG/L
D DIMER PPP FEU-MCNC: 0.42 UG/ML FEU
EOSINOPHIL # BLD AUTO: 0.44 THOUSAND/ÂΜL (ref 0–0.61)
EOSINOPHIL NFR BLD AUTO: 4 % (ref 0–6)
ERYTHROCYTE [DISTWIDTH] IN BLOOD BY AUTOMATED COUNT: 12.8 % (ref 11.6–15.1)
ERYTHROCYTE [SEDIMENTATION RATE] IN BLOOD: 35 MM/HOUR (ref 0–29)
GFR SERPL CREATININE-BSD FRML MDRD: 62 ML/MIN/1.73SQ M
GLUCOSE SERPL-MCNC: 101 MG/DL (ref 65–140)
HCT VFR BLD AUTO: 39.6 % (ref 34.8–46.1)
HGB BLD-MCNC: 12.6 G/DL (ref 11.5–15.4)
IMM GRANULOCYTES # BLD AUTO: 0.06 THOUSAND/UL (ref 0–0.2)
IMM GRANULOCYTES NFR BLD AUTO: 1 % (ref 0–2)
INR PPP: 0.91 (ref 0.84–1.19)
LACTATE SERPL-SCNC: 1 MMOL/L (ref 0.5–2)
LIPASE SERPL-CCNC: 299 U/L (ref 73–393)
LYMPHOCYTES # BLD AUTO: 1.62 THOUSANDS/ÂΜL (ref 0.6–4.47)
LYMPHOCYTES NFR BLD AUTO: 15 % (ref 14–44)
MCH RBC QN AUTO: 28.2 PG (ref 26.8–34.3)
MCHC RBC AUTO-ENTMCNC: 31.8 G/DL (ref 31.4–37.4)
MCV RBC AUTO: 89 FL (ref 82–98)
MONOCYTES # BLD AUTO: 1 THOUSAND/ÂΜL (ref 0.17–1.22)
MONOCYTES NFR BLD AUTO: 9 % (ref 4–12)
NEUTROPHILS # BLD AUTO: 7.52 THOUSANDS/ÂΜL (ref 1.85–7.62)
NEUTS SEG NFR BLD AUTO: 70 % (ref 43–75)
NRBC BLD AUTO-RTO: 0 /100 WBCS
NT-PROBNP SERPL-MCNC: 48 PG/ML
PLATELET # BLD AUTO: 303 THOUSANDS/UL (ref 149–390)
PMV BLD AUTO: 9.8 FL (ref 8.9–12.7)
POTASSIUM SERPL-SCNC: 3.6 MMOL/L (ref 3.5–5.3)
PROT SERPL-MCNC: 7.8 G/DL (ref 6.4–8.4)
PROTHROMBIN TIME: 12.4 SECONDS (ref 11.6–14.5)
RBC # BLD AUTO: 4.47 MILLION/UL (ref 3.81–5.12)
SODIUM SERPL-SCNC: 136 MMOL/L (ref 135–147)
WBC # BLD AUTO: 10.7 THOUSAND/UL (ref 4.31–10.16)

## 2022-11-28 RX ORDER — KETOROLAC TROMETHAMINE 30 MG/ML
15 INJECTION, SOLUTION INTRAMUSCULAR; INTRAVENOUS ONCE
Status: COMPLETED | OUTPATIENT
Start: 2022-11-28 | End: 2022-11-28

## 2022-11-28 RX ORDER — MORPHINE SULFATE 4 MG/ML
4 INJECTION, SOLUTION INTRAMUSCULAR; INTRAVENOUS ONCE
Status: COMPLETED | OUTPATIENT
Start: 2022-11-28 | End: 2022-11-28

## 2022-11-28 RX ORDER — MORPHINE SULFATE 15 MG/1
15 TABLET ORAL ONCE
Status: COMPLETED | OUTPATIENT
Start: 2022-11-28 | End: 2022-11-28

## 2022-11-28 RX ORDER — ONDANSETRON 4 MG/1
4 TABLET, FILM COATED ORAL EVERY 6 HOURS PRN
Qty: 10 TABLET | Refills: 0 | Status: SHIPPED | OUTPATIENT
Start: 2022-11-28

## 2022-11-28 RX ORDER — MORPHINE SULFATE 15 MG/1
15 TABLET ORAL EVERY 8 HOURS PRN
Qty: 8 TABLET | Refills: 0 | Status: SHIPPED | OUTPATIENT
Start: 2022-11-28

## 2022-11-28 RX ADMIN — IOHEXOL 100 ML: 350 INJECTION, SOLUTION INTRAVENOUS at 14:04

## 2022-11-28 RX ADMIN — KETOROLAC TROMETHAMINE 15 MG: 30 INJECTION, SOLUTION INTRAMUSCULAR at 13:59

## 2022-11-28 RX ADMIN — MORPHINE SULFATE 4 MG: 4 INJECTION INTRAVENOUS at 15:39

## 2022-11-28 RX ADMIN — SODIUM CHLORIDE 500 ML: 0.9 INJECTION, SOLUTION INTRAVENOUS at 13:14

## 2022-11-28 RX ADMIN — MORPHINE SULFATE 15 MG: 15 TABLET ORAL at 15:38

## 2022-11-28 NOTE — DISCHARGE INSTRUCTIONS
Retained ureteral stent   Return immediately if worse or any new symptoms  Tylenol 1000 mg every 6 hours as needed  and/or  Advil 400 mg every 6 hours as needed  May take both together

## 2022-11-28 NOTE — ED PROVIDER NOTES
History  Chief Complaint   Patient presents with   • Chest Pain     Patient c/o mid lower chest pain that radiates into back since yesterday  Patient denies NVD at this time  Patient having some sob at this time  82-year-old female describes sharp epigastric discomfort that began yesterday morning when she woke up with some associated dyspnea  Pain is persisted and fluctuated, worse prior to arrival   Questions similarity between recent history of pericarditis  Medical history includes rheumatoid arthritis and immune modulating treatments  Denies CAD and VTE  History provided by:  Patient  Epigastric Pain  Pain quality: sharp and stabbing    Pain radiates to:  Mid back  Pain radiates to the back: yes    Pain severity:  Severe  Onset quality:  Gradual  Timing:  Constant  Progression:  Waxing and waning  Chronicity:  New  Context: at rest    Relieved by:  Nothing  Worsened by:  Nothing tried  Ineffective treatments:  None tried  Associated symptoms: abdominal pain and shortness of breath    Associated symptoms: no fever    Risk factors: no coronary artery disease and no prior DVT/PE        Prior to Admission Medications   Prescriptions Last Dose Informant Patient Reported? Taking?    Ascorbic Acid (Vitamin C) 500 MG CAPS   Yes No   Sig: Take by mouth   Cholecalciferol 50 MCG (2000 UT) CAPS   Yes No   Sig: Take 1 capsule by mouth daily    Diclofenac Sodium (VOLTAREN) 1 %   Yes No   Sig: diclofenac 1 % topical gel   APPLY 2 GRAMS TO THE AFFECTED AREA(S) BY TOPICAL ROUTE 4 TIMES PER DAY   HYDROcodone-acetaminophen (NORCO) 5-325 mg per tablet   No No   Sig: Take 1-2 tablets by mouth every 6 (six) hours as needed for pain for up to 5 dosesMax Daily Amount: 8 tablets   Patient not taking: Reported on 2/21/2022    Magnesium 100 MG CAPS   Yes No   Sig: Take by mouth daily    Multiple Vitamins-Minerals (Multivitamin Gummies Womens) CHEW   Yes No   Sig: Chew 1 each daily    acetaminophen (Tylenol) 325 mg tablet   No No   Sig: Take 2 tablets (650 mg total) by mouth every 6 (six) hours as needed for mild pain   aspirin (ECOTRIN LOW STRENGTH) 81 mg EC tablet   Yes No   Sig: Take 81 mg by mouth daily   colchicine (COLCRYS) 0 6 mg tablet   No No   Sig: Take 1 tablet (0 6 mg total) by mouth 2 (two) times a day   cyanocobalamin (VITAMIN B-12) 1000 MCG tablet   Yes No   Sig: Take by mouth   ezetimibe (ZETIA) 10 mg tablet   Yes No   Sig: Take 10 mg by mouth daily    fluticasone (FLONASE) 50 mcg/act nasal spray   Yes No   Si spray into each nostril as needed    ibuprofen (MOTRIN) 400 mg tablet   No No   Sig: Take 2 tablets (800 mg total) by mouth every 8 (eight) hours for 7 days, THEN 1 5 tablets (600 mg total) 2 (two) times a day for 14 days, THEN 1 tablet (400 mg total) 2 (two) times a day for 7 days, THEN 1 tablet (400 mg total) in the morning for 7 days  leflunomide (ARAVA) 20 MG tablet   Yes No   Sig: Take 20 mg by mouth daily   montelukast (SINGULAIR) 10 mg tablet   Yes No   Sig: Take 10 mg by mouth daily   oxybutynin (DITROPAN) 5 mg tablet   No No   Sig: Take 1 tablet (5 mg total) by mouth 3 (three) times a day as needed (bladder spasm)   pantoprazole (PROTONIX) 40 mg tablet   Yes No   Sig: Take 40 mg by mouth daily   tamsulosin (FLOMAX) 0 4 mg   No No   Sig: TAKE ONE CAPSULE BY MOUTH EVERY DAY with dinner      Facility-Administered Medications: None       Past Medical History:   Diagnosis Date   • Breast cancer ([facility] )     Pt had in left breast- had radiation and surgery   • CPAP (continuous positive airway pressure) dependence     Pt uses nightly   • GERD (gastroesophageal reflux disease)    • H/O cervical fracture    • Hyperlipidemia    • Irregular heart beat     Pt is taking Zetia   Pt does not have happen anymore per pt since on medication   • Rheumatoid arthritis ([facility] )    • Seasonal allergies    • Sleep apnea    • Wears glasses        Past Surgical History:   Procedure Laterality Date   • BLADDER SUSPENSION     • BREAST LUMPECTOMY     •  SECTION     • COLONOSCOPY     • EGD     • FL RETROGRADE PYELOGRAM  2021   • FOOT SURGERY Right    • HYSTERECTOMY     • IL CYSTO/URETERO W/LITHOTRIPSY &INDWELL STENT INSRT Left 2021    Procedure: CYSTOSCOPY URETEROSCOPY WITH LITHOTRIPSY HOLMIUM LASER,  AND INSERTION STENT URETERAL;  Surgeon: Corinna Mercado MD;  Location: [facility];  Service: Urology   • IL CYSTOURETHROSCOPY,URETER CATHETER Left 2021    Procedure: CYSTOSCOPY RETROGRADE PYELOGRAM WITH INSERTION STENT URETERAL;  Surgeon: Demi Joya MD;  Location: [facility];  Service: Urology   • SINUS SURGERY      Deviated septum       Family History   Problem Relation Age of Onset   • Lung cancer Father    • Heart disease Maternal Aunt    • Heart disease Maternal Uncle    • Kidney cancer Maternal Grandfather    • Lung cancer Maternal Grandfather    • Heart attack Brother      I have reviewed and agree with the history as documented  E-Cigarette/Vaping   • E-Cigarette Use Never User      E-Cigarette/Vaping Substances     Social History     Tobacco Use   • Smoking status: Former     Types: Cigarettes     Quit date:      Years since quittin 9   • Smokeless tobacco: Never   Vaping Use   • Vaping Use: Never used   Substance Use Topics   • Alcohol use: Yes   • Drug use: Not Currently       Review of Systems   Constitutional: Negative for fever  Respiratory: Positive for shortness of breath  Gastrointestinal: Positive for abdominal pain  All other systems reviewed and are negative  Physical Exam  Physical Exam  Vitals and nursing note reviewed  Constitutional:       Comments: Pleasant, comfortable-appearing   HENT:      Head: Normocephalic and atraumatic  Mouth/Throat:      Mouth: Mucous membranes are moist       Pharynx: Oropharynx is clear  Eyes:      Conjunctiva/sclera: Conjunctivae normal       Pupils: Pupils are equal, round, and reactive to light     Cardiovascular:      Rate and Rhythm: Normal rate and regular rhythm  Heart sounds: Normal heart sounds  Pulmonary:      Effort: Pulmonary effort is normal       Breath sounds: Normal breath sounds  Abdominal:      General: Bowel sounds are normal  There is no distension  Palpations: Abdomen is soft  Tenderness: There is abdominal tenderness  Comments: Minimal epigastric discomfort   Musculoskeletal:         General: No deformity  Cervical back: Neck supple  Skin:     General: Skin is warm and dry  Neurological:      General: No focal deficit present  Mental Status: She is alert and oriented to person, place, and time  Cranial Nerves: No cranial nerve deficit  Coordination: Coordination normal    Psychiatric:         Behavior: Behavior normal          Thought Content:  Thought content normal          Judgment: Judgment normal          Vital Signs  ED Triage Vitals [11/28/22 1259]   Temperature Pulse Respirations Blood Pressure SpO2   98 2 °F (36 8 °C) 77 18 166/85 100 %      Temp Source Heart Rate Source Patient Position - Orthostatic VS BP Location FiO2 (%)   Oral Monitor Lying Left arm --      Pain Score       9           Vitals:    11/28/22 1259 11/28/22 1437 11/28/22 1535 11/28/22 1730   BP: 166/85 150/84 162/78 126/78   Pulse: 77 95 76 98   Patient Position - Orthostatic VS: Lying Lying  Lying         Visual Acuity      ED Medications  Medications   sodium chloride 0 9 % bolus 500 mL (0 mL Intravenous Stopped 11/28/22 1535)   ketorolac (TORADOL) injection 15 mg (15 mg Intravenous Given 11/28/22 1359)   iohexol (OMNIPAQUE) 350 MG/ML injection (SINGLE-DOSE) 100 mL (100 mL Intravenous Given 11/28/22 1404)   morphine injection 4 mg (4 mg Intravenous Given 11/28/22 1539)   morphine (MSIR) IR tablet 15 mg (15 mg Oral Given 11/28/22 1538)       Diagnostic Studies  Results Reviewed     Procedure Component Value Units Date/Time    HS Troponin I 2hr [[id number]]  (Normal) Collected: 11/28/22 1537    Lab Status: Final result Specimen: Blood from Arm, Right Updated: 11/28/22 1610     hs TnI 2hr 2 ng/L      Delta 2hr hsTnI >0 ng/L     HS Troponin I 4hr [[id number]]     Lab Status: No result Specimen: Blood     Comprehensive metabolic panel [[id number]]  (Abnormal) Collected: 11/28/22 1314    Lab Status: Final result Specimen: Blood from Arm, Right Updated: 11/28/22 1347     Sodium 136 mmol/L      Potassium 3 6 mmol/L      Chloride 102 mmol/L      CO2 28 mmol/L      ANION GAP 6 mmol/L      BUN 16 mg/dL      Creatinine 0 98 mg/dL      Glucose 101 mg/dL      Calcium 9 4 mg/dL      AST 21 U/L      ALT 35 U/L      Alkaline Phosphatase 146 U/L      Total Protein 7 8 g/dL      Albumin 3 6 g/dL      Total Bilirubin 0 35 mg/dL      eGFR 62 ml/min/1 73sq m     Narrative:      [place] guidelines for Chronic Kidney Disease (CKD):   •  Stage 1 with normal or high GFR (GFR > 90 mL/min/1 73 square meters)  •  Stage 2 Mild CKD (GFR = 60-89 mL/min/1 73 square meters)  •  Stage 3A Moderate CKD (GFR = 45-59 mL/min/1 73 square meters)  •  Stage 3B Moderate CKD (GFR = 30-44 mL/min/1 73 square meters)  •  Stage 4 Severe CKD (GFR = 15-29 mL/min/1 73 square meters)  •  Stage 5 End Stage CKD (GFR <15 mL/min/1 73 square meters)  Note: GFR calculation is accurate only with a steady state creatinine    Lipase [[id number]]  (Normal) Collected: 11/28/22 1314    Lab Status: Final result Specimen: Blood from Arm, Right Updated: 11/28/22 1347     Lipase 299 u/L     C-reactive protein [[id number]]  (Abnormal) Collected: 11/28/22 1314    Lab Status: Final result Specimen: Blood from Arm, Right Updated: 11/28/22 1347     CRP 22 8 mg/L     NT-BNP PRO [[id number]]  (Normal) Collected: 11/28/22 1314    Lab Status: Final result Specimen: Blood from Arm, Right Updated: 11/28/22 1347     NT-proBNP 48 pg/mL     HS Troponin 0hr (reflex protocol) [[id number]]  (Normal) Collected: 11/28/22 1314    Lab Status: Final result Specimen: Blood from Arm, Right Updated: 11/28/22 1347     hs TnI 0hr <2 ng/L     Lactic acid [[id number]]  (Normal) Collected: 11/28/22 1314    Lab Status: Final result Specimen: Blood from Arm, Right Updated: 11/28/22 1345     LACTIC ACID 1 0 mmol/L     Narrative:      Result may be elevated if tourniquet was used during collection  D-Dimer [[id number]]  (Normal) Collected: 11/28/22 1314    Lab Status: Final result Specimen: Blood from Arm, Right Updated: 11/28/22 1339     D-Dimer, Quant 0 42 ug/ml FEU     Narrative: In the evaluation for possible pulmonary embolism, in the appropriate (Well's Score of 4 or less) patient, the age adjusted d-dimer cutoff for this patient can be calculated as:    Age x 0 01 (in ug/mL) for Age-adjusted D-dimer exclusion threshold for a patient over 50 years      Protime-INR [[id number]]  (Normal) Collected: 11/28/22 1314    Lab Status: Final result Specimen: Blood from Arm, Right Updated: 11/28/22 1336     Protime 12 4 seconds      INR 0 91    APTT [[id number]]  (Normal) Collected: 11/28/22 1314    Lab Status: Final result Specimen: Blood from Arm, Right Updated: 11/28/22 1336     PTT 28 seconds     Sedimentation rate, automated [[id number]]  (Abnormal) Collected: 11/28/22 1314    Lab Status: Final result Specimen: Blood from Arm, Right Updated: 11/28/22 1325     Sed Rate 35 mm/hour     CBC and differential [[id number]]  (Abnormal) Collected: 11/28/22 1314    Lab Status: Final result Specimen: Blood from Arm, Right Updated: 11/28/22 1322     WBC 10 70 Thousand/uL      RBC 4 47 Million/uL      Hemoglobin 12 6 g/dL      Hematocrit 39 6 %      MCV 89 fL      MCH 28 2 pg      MCHC 31 8 g/dL      RDW 12 8 %      MPV 9 8 fL      Platelets 323 Thousands/uL      nRBC 0 /100 WBCs      Neutrophils Relative 70 %      Immat GRANS % 1 %      Lymphocytes Relative 15 %      Monocytes Relative 9 %      Eosinophils Relative 4 %      Basophils Relative 1 %      Neutrophils Absolute 7 52 Thousands/µL      Immature Grans Absolute 0 06 Thousand/uL      Lymphocytes Absolute 1 62 Thousands/µL      Monocytes Absolute 1 00 Thousand/µL      Eosinophils Absolute 0 44 Thousand/µL      Basophils Absolute 0 06 Thousands/µL                  PE Study with CT Abdomen and Pelvis with contrast   Final Result by Ariana Crocker MD (11/28 1603)   Addendum (preliminary) 1 of 1 by Ariana Crocker MD (11/28 1603)   ADDENDUM:      Two solid pulmonary nodules in the right upper lobe measuring up to 0 7    cm  Based on current Fleischner Society 2017 Guidelines on incidental    pulmonary nodule, followup non-contrast CT is recommended at 6-12 months    from the initial examination and, if    stable at that time, an additional followup is recommended for 18-24    months from the initial examination  The study was marked in [facility] for immediate notification  Final      1  Mild acute pancreatitis of the pancreatic head, 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  No peripancreatic fluid collections  2  No pulmonary embolism  3  Retained fragment of a prior ureteral stent in the upper calyx of the    left kidney  Recommend consultation with urology to determine management    of the retained fragment of the prior ureteral stent  4  Slight increase in the amount of calculi in the left renal calyces    since prior study 5/20/2021  No hydroureteronephrosis  The study was marked in [facility] for immediate notification        Workstation performed: [id number]                    Procedures  ECG 12 Lead Documentation Only    Date/Time: 11/28/2022 1:03 PM  Performed by: Neeraj Huerta DO  Authorized by: Neeraj Huerta DO     Indications / Diagnosis:  Chest pain dyspnea  ECG reviewed by me, the ED Provider: yes    Patient location:  ED  Interpretation:     Interpretation: normal    Rate:     ECG rate:  Eighty    ECG rate assessment: normal    Rhythm:     Rhythm: sinus rhythm    Ectopy:     Ectopy: none    QRS:     QRS axis: Normal  Conduction:     Conduction: normal    ST segments:     ST segments:  Normal  T waves:     T waves: inverted      Inverted:  V1             ED Course  ED Course as of 11/28/22 1812   Mon Nov 28, 2022   1436 C-REACTIVE PROTEIN(!): 22 8   1436 NT-proBNP: 48   1436 Lipase: 299   1436 hs TnI 0hr: <2   1436 LACTIC ACID: 1 0   1436 D-Dimer, Quant: 0 42   1436 Sed Rate(!): 35   1436 WBC(!): 10 70   1520 Lipase: 299   1530 Notes pain initially amenable and has recurred   1720 Comfort improved, discussed results and agreeable close outpatient follow-up, spouse present and supportive             HEART Risk Score    Flowsheet Row Most Recent Value   Heart Score Risk Calculator    History 0 Filed at: 11/28/2022 1725   ECG 0 Filed at: 11/28/2022 1725   Age 1 Filed at: 11/28/2022 1725   Risk Factors 1 Filed at: 11/28/2022 1725   Troponin 0 Filed at: 11/28/2022 1725   HEART Score 2 Filed at: 11/28/2022 1725                        SBIRT 20yo+    Flowsheet Row Most Recent Value   SBIRT (23 yo +)    In order to provide better care to our patients, we are screening all of our patients for alcohol and drug use  Would it be okay to ask you these screening questions?  Unable to answer at this time Filed at: 11/28/2022 1408                    MDM    Disposition  Final diagnoses:   Epigastric pain   Pancreatitis   Retained ureteral stent     Time reflects when diagnosis was documented in both MDM as applicable and the Disposition within this note     Time User Action Codes Description Comment    11/28/2022  3:27 PM Dani Lown Add [R10 13] Epigastric pain     11/28/2022  3:27 PM Dani Lown Add [K85 90] Pancreatitis     11/28/2022  5:19 PM Dani Lown Add [H20 987] History of stent insertion of renal artery     11/28/2022  5:19 PM Dani Lown Remove [E03 378] History of stent insertion of renal artery     11/28/2022  5:25 PM Dani Lown Add [Z96 0] Retained ureteral stent       ED Disposition     ED Disposition   Discharge    Condition   Stable    Date/Time   Mon Nov 28, 2022  5:19 PM    Comment   Taylor Andrea discharge to home/self care                 Follow-up Information     Follow up With Specialties Details Why Contact Info    Magdi Reddy MD [facility] Medicine Schedule an appointment as soon as possible for a visit in 3 days  [address]      Corey López MD Gastroenterology Schedule an appointment as soon as possible for a visit in 1 week  [address]  [address]      Jacinta Bond MD Urology Schedule an appointment as soon as possible for a visit in 1 week  [address] [facility] [address]  [phone]            Discharge Medication List as of 11/28/2022  5:28 PM      START taking these medications    Details   morphine (MSIR) 15 mg tablet Take 1 tablet (15 mg total) by mouth every 8 (eight) hours as needed for severe pain for up to 3 doses Max Daily Amount: 45 mg, Starting Mon 11/28/2022, Normal      ondansetron (ZOFRAN) 4 mg tablet Take 1 tablet (4 mg total) by mouth every 6 (six) hours as needed for nausea or vomiting, Starting Mon 11/28/2022, Normal         CONTINUE these medications which have NOT CHANGED    Details   acetaminophen (Tylenol) 325 mg tablet Take 2 tablets (650 mg total) by mouth every 6 (six) hours as needed for mild pain, Starting Sun 5/23/2021, No Print      Ascorbic Acid (Vitamin C) 500 MG CAPS Take by mouth, Historical Med      aspirin (ECOTRIN LOW STRENGTH) 81 mg EC tablet Take 81 mg by mouth daily, Historical Med      Cholecalciferol 50 MCG (2000 UT) CAPS Take 1 capsule by mouth daily , Historical Med      colchicine (COLCRYS) 0 6 mg tablet Take 1 tablet (0 6 mg total) by mouth 2 (two) times a day, Starting Tue 2/22/2022, Until Mon 5/23/2022, Normal      cyanocobalamin (VITAMIN B-12) 1000 MCG tablet Take by mouth, Historical Med      Diclofenac Sodium (VOLTAREN) 1 % diclofenac 1 % topical gel   APPLY 2 GRAMS TO THE AFFECTED AREA(S) BY TOPICAL ROUTE 4 TIMES PER DAY, Historical Med      ezetimibe (ZETIA) 10 mg tablet Take 10 mg by mouth daily , Starting Sat 11/7/2020, Until Thu 12/9/2021, Historical Med      fluticasone (FLONASE) 50 mcg/act nasal spray 1 spray into each nostril as needed , Historical Med      HYDROcodone-acetaminophen (NORCO) 5-325 mg per tablet Take 1-2 tablets by mouth every 6 (six) hours as needed for pain for up to 5 dosesMax Daily Amount: 8 tablets, Starting Wed 5/19/2021, Normal      ibuprofen (MOTRIN) 400 mg tablet Multiple Dosages:Starting Tue 2/22/2022, Until Mon 2/28/2022 at 2359, THEN Starting Tue 3/1/2022, Until Mon 3/14/2022 at 2359, THEN Starting Tue 3/15/2022, Until Mon 3/21/2022 at 2359, THEN Starting Tue 3/22/2022, Until Mon 3/28/2022 at 2359Take 2 t ablets (800 mg total) by mouth every 8 (eight) hours for 7 days, THEN 1 5 tablets (600 mg total) 2 (two) times a day for 14 days, THEN 1 tablet (400 mg total) 2 (two) times a day for 7 days, THEN 1 tablet (400 mg total) in the morning for 7 days  , Normal      leflunomide (ARAVA) 20 MG tablet Take 20 mg by mouth daily, Historical Med      Magnesium 100 MG CAPS Take by mouth daily , Historical Med      montelukast (SINGULAIR) 10 mg tablet Take 10 mg by mouth daily, Historical Med      Multiple Vitamins-Minerals (Multivitamin Gummies Womens) CHEW Chew 1 each daily , Historical Med      oxybutynin (DITROPAN) 5 mg tablet Take 1 tablet (5 mg total) by mouth 3 (three) times a day as needed (bladder spasm), Starting Wed 5/19/2021, Normal      pantoprazole (PROTONIX) 40 mg tablet Take 40 mg by mouth daily, Starting Mon 11/23/2020, Historical Med      tamsulosin (FLOMAX) 0 4 mg TAKE ONE CAPSULE BY MOUTH EVERY DAY with dinner, Normal             No discharge procedures on file      PDMP Review       Value Time User    PDMP Reviewed  Yes 2/21/2022 10:53 AM Kylee Chirinos MD          ED Provider  Electronically Signed by           Olga Spence,   11/28/22 1812

## 2022-11-29 ENCOUNTER — TELEPHONE (OUTPATIENT)
Dept: UROLOGY | Facility: CLINIC | Age: 62
End: 2022-11-29

## 2022-11-29 LAB
ATRIAL RATE: 80 BPM
P AXIS: 76 DEGREES
PR INTERVAL: 170 MS
QRS AXIS: 61 DEGREES
QRSD INTERVAL: 76 MS
QT INTERVAL: 386 MS
QTC INTERVAL: 445 MS
T WAVE AXIS: 57 DEGREES
VENTRICULAR RATE: 80 BPM

## 2022-11-29 NOTE — TELEPHONE ENCOUNTER
Pt  Called had u/s done yesterday and was told she she still has stent in left kidney and she is questioning if it still needs to be there [phone]

## 2022-11-30 DIAGNOSIS — K85.00 IDIOPATHIC ACUTE PANCREATITIS WITHOUT NECROSIS OR INFECTION: ICD-10-CM

## 2022-12-03 ENCOUNTER — HOSPITAL ENCOUNTER (OUTPATIENT)
Dept: ULTRASOUND IMAGING | Facility: HOSPITAL | Age: 62
Discharge: HOME | End: 2022-12-03

## 2022-12-03 ENCOUNTER — HOSPITAL ENCOUNTER (OUTPATIENT)
Dept: ULTRASOUND IMAGING | Facility: HOSPITAL | Age: 62
Discharge: HOME | End: 2022-12-03
Attending: UROLOGY

## 2022-12-03 ENCOUNTER — APPOINTMENT (OUTPATIENT)
Dept: LAB | Facility: HOSPITAL | Age: 62
End: 2022-12-03

## 2022-12-03 DIAGNOSIS — R74.01 ELEVATED ALT MEASUREMENT: ICD-10-CM

## 2022-12-03 DIAGNOSIS — N20.1 URETERAL CALCULUS: ICD-10-CM

## 2022-12-03 DIAGNOSIS — N20.0 STAGHORN CALCULUS: ICD-10-CM

## 2022-12-03 DIAGNOSIS — R74.8 ELEVATED ALKALINE PHOSPHATASE LEVEL: ICD-10-CM

## 2022-12-03 DIAGNOSIS — K85.00 IDIOPATHIC ACUTE PANCREATITIS WITHOUT NECROSIS OR INFECTION: ICD-10-CM

## 2022-12-03 LAB
HBV CORE AB SER QL: NORMAL
HBV CORE IGM SER QL: NORMAL
HBV SURFACE AB SER-ACNC: 80.12 MIU/ML
HBV SURFACE AG SER QL: NORMAL
HCV AB SER QL: NORMAL
INR PPP: 0.89 (ref 0.84–1.19)
PROTHROMBIN TIME: 12.2 SECONDS (ref 11.6–14.5)

## 2022-12-03 NOTE — LETTER
[address] [place] [address]  [address] [facility] [address]      December 12, 2022    MRN: [id number]     Phone: [phone]     Dear Ms Remington Wilkes recently had a(n) Ultrasound performed on 12/3/2022 at  [address] [place] [address] that was requested by Ariela Valdez  The study was reviewed by a radiologist, which is a physician who specializes in medical imaging  The radiologist issued a report describing his or her findings  In that report there was a finding that the radiologist felt warranted further discussion with your health care provider and that discussion would be beneficial to you  [facility] [place] [address] on 12/07/2022  9:36 AM  We recommend that you contact [place] [address] at [phone] or set up an appointment to discuss the results of the imaging test  If you have already heard from Ariela Valdez regarding the results of your study, you can disregard this letter  This letter is not meant to alarm you, but intended to encourage you to follow-up on your results with the provider that sent you for the imaging study  In addition, we have enclosed answers to frequently asked questions by other patients who have also received a letter to review results with their health care provider (see page two)  Thank you for choosing [address] [place] [address] for your medical imaging needs  FREQUENTLY ASKED QUESTIONS    1  Why am I receiving this letter? [facility] requires us to notify patients who have findings on imaging exams that may require more testing or follow-up with a health professional within the next 3 months          2  How serious is the finding on the imaging test?  This letter is sent to all patients who may need follow-up or more testing within the next 3 months  Receiving this letter does not necessarily mean you have a life-threatening imaging finding or disease  Recommendations in the radiologist’s imaging report are general in nature and it is up to your healthcare provider to say whether those recommendations make sense for your situation  You are strongly encouraged to talk to your health care provider about the results and ask whether additional steps need to be taken  3  Where can I get a copy of the final report for my recent radiology exam?  To get a full copy of the report you can access your records online at [email or web address] or please contact [facility] Medical Records Department at [phone] Monday through Friday between 8 am and 6 pm          4  What do I need to do now? Please contact your health care provider who requested the imaging study to discuss what further actions (if any) are needed  You may have already heard from (your ordering provider) in regard to this test in which case you can disregard this letter  NOTICE IN ACCORDANCE WITH THE [place] STATE “PATIENT TEST RESULT INFORMATION ACT OF 2018”    You are receiving this notice as a result of a determination by your diagnostic imaging service that further discussions of your test results are warranted and would be beneficial to you  The complete results of your test or tests have been or will be sent to the health care practitioner that ordered the test or tests  It is recommended that you contact your health care practitioner to discuss your results as soon as possible

## 2022-12-06 LAB
HCV RNA SERPL NAA+PROBE-ACNC: NORMAL IU/ML
TEST INFORMATION: NORMAL

## 2022-12-07 NOTE — RESULT ENCOUNTER NOTE
He underwent cystoscopy left ureteroscopy and laser lithotripsy of a partial staghorn calculus by me a year and a half ago  The stent came out on its own at home  Since he did not come out of the office, we do not know if the stent came out intact or not  It appears there may be retained stent fragment up in the kidney itself as seen on the CT scan November 28 2022-however it also could simply be recurrence of stone  We will review this with the patient when she comes to see me December 15  We will decide what to do from there  Intervention could include cystoscopy left ureteroscopy and laser lithotripsy of the retained fragments with extraction

## 2022-12-13 RX ORDER — TRAMADOL HYDROCHLORIDE 50 MG/1
TABLET ORAL
COMMUNITY
Start: 2022-11-30 | End: 2022-12-15 | Stop reason: ALTCHOICE

## 2022-12-15 ENCOUNTER — OFFICE VISIT (OUTPATIENT)
Dept: UROLOGY | Facility: CLINIC | Age: 62
End: 2022-12-15

## 2022-12-15 VITALS
SYSTOLIC BLOOD PRESSURE: 118 MMHG | HEART RATE: 72 BPM | OXYGEN SATURATION: 98 % | TEMPERATURE: 97.3 F | HEIGHT: 64 IN | WEIGHT: 197 LBS | DIASTOLIC BLOOD PRESSURE: 72 MMHG | BODY MASS INDEX: 33.63 KG/M2

## 2022-12-15 DIAGNOSIS — Z96.0 RETAINED URETERAL STENT: ICD-10-CM

## 2022-12-15 DIAGNOSIS — R39.89 SUSPECTED UTI: Primary | ICD-10-CM

## 2022-12-15 DIAGNOSIS — N20.0 RENAL CALCULUS, LEFT: ICD-10-CM

## 2022-12-15 LAB
SL AMB  POCT GLUCOSE, UA: ABNORMAL
SL AMB LEUKOCYTE ESTERASE,UA: ABNORMAL
SL AMB POCT BILIRUBIN,UA: ABNORMAL
SL AMB POCT BLOOD,UA: ABNORMAL
SL AMB POCT CLARITY,UA: ABNORMAL
SL AMB POCT COLOR,UA: ABNORMAL
SL AMB POCT KETONES,UA: ABNORMAL
SL AMB POCT NITRITE,UA: ABNORMAL
SL AMB POCT PH,UA: 7
SL AMB POCT SPECIFIC GRAVITY,UA: 1.01
SL AMB POCT URINE PROTEIN: ABNORMAL
SL AMB POCT UROBILINOGEN: 0.2

## 2022-12-15 RX ORDER — CLINDAMYCIN PHOSPHATE 900 MG/50ML
900 INJECTION INTRAVENOUS ONCE
Status: CANCELLED | OUTPATIENT
Start: 2022-12-21 | End: 2022-12-15

## 2022-12-15 NOTE — H&P
[address] [facility]  [facility]  [address] [facility], [address] [place] [address]  [phone]  www  [facility]  org      NAME: Sasha Mcginnis  AGE: 63 y o  SEX: female  : 1960   MRN: [id number]    DATE: 12/15/2022  TIME: 9:03 AM    Assessment and Plan:    Left renal calculus, possible retained stent in the kidney itself-it appears that when her stent came out it was not intact  We went over the options which are observation versus cystoscopy left ureteroscopy laser lithotripsy of the fragment surrounding the stent and basketing the stent  After discussion we feel be best to proceed with cystoscopy left ureteroscopy laser lithotripsy and left ureteral stent placement with a string  The risks of bleeding infection damage urinary tract and possible need for additional procedures were explained and she gives informed consent  It is also possible if I am not able to get out this way we may have to try a percutaneous approach but I feel fairly confident that I should be able to get it out  We will schedule this  Chief Complaint   No chief complaint on file  History of Present Illness   Left nephrolithiasis- underwent cystoscopy left ureteroscopy and laser lithotripsy of a partial staghorn calculus by me a year and a half ago  The stent came out on its own at home  Since he did not come out of the office, we do not know if the stent came out intact or not  It appears there may be retained stent fragment up in the kidney itself as seen on the CT scan 2022-however it also could simply be recurrence of stone  We also reviewed her films together  Compared these to May 20, 2021  She said when the stent came out it looked like there was only 1 coil on it  She currently is asymptomatic  Urine was cloudy and we have sent this for culture    It looks like there is a coil or part of a coil up in the kidney with some calcifications attached to  Of note her follow-up renal ultrasound made no mention of any retained stent and there were no stones in that study  The following portions of the patient's history were reviewed and updated as appropriate: allergies, current medications, past family history, past medical history, past social history, past surgical history and problem list   Past Medical History:   Diagnosis Date   • [facility] ([facility]     Pt had in left breast- had radiation and surgery   • CPAP (continuous positive airway pressure) dependence     Pt uses nightly   • GERD (gastroesophageal reflux disease)    • H/O cervical fracture    • Hyperlipidemia    • Irregular heart beat     Pt is taking Zetia  Pt does not have happen anymore per pt since on medication   • Rheumatoid arthritis ([facility] 75 )    • Seasonal allergies    • Sleep apnea    • Wears glasses      Past Surgical History:   Procedure Laterality Date   • BLADDER SUSPENSION     • BREAST LUMPECTOMY     •  SECTION     • COLONOSCOPY     • EGD     • FL RETROGRADE PYELOGRAM  2021   • FOOT SURGERY Right    • HYSTERECTOMY     • AK CYSTO/URETERO W/LITHOTRIPSY &INDWELL STENT INSRT Left 2021    Procedure: CYSTOSCOPY URETEROSCOPY WITH LITHOTRIPSY HOLMIUM LASER,  AND INSERTION STENT URETERAL;  Surgeon: Rebecca Casillas MD;  Location:  MAIN OR;  Service: Urology   • AK CYSTOURETHROSCOPY,URETER CATHETER Left 2021    Procedure: CYSTOSCOPY RETROGRADE PYELOGRAM WITH INSERTION STENT URETERAL;  Surgeon: Verdell Gilford, MD;  Location:  MAIN OR;  Service: Urology   • SINUS SURGERY      Deviated septum     shoulder  Review of Systems   Review of Systems   Constitutional: Negative for fever  Genitourinary: Negative  Musculoskeletal: Positive for back pain         Active Problem List     Patient Active Problem List   Diagnosis   • Hydronephrosis of left kidney   • Rheumatoid arthritis ([facility] )   • Gastroesophageal reflux disease without esophagitis   • Malignant neoplasm of female breast ([facility] )   • History of hyperlipidemia   • Obesity (BMI 30 0-34  9)   • Sleep apnea   • Precordial chest pain   • Pericarditis       Objective   /72 (BP Location: Left arm, Patient Position: Sitting)   Pulse 72   Temp (!) 97 3 °F (36 3 °C)   Ht 5' 4" (1 626 m)   Wt 89 4 kg (197 lb)   SpO2 98%   BMI 33 81 kg/m²     Physical Exam  Vitals reviewed  Constitutional:       Appearance: Normal appearance  HENT:      Head: Normocephalic and atraumatic  Eyes:      Extraocular Movements: Extraocular movements intact  Pulmonary:      Effort: Pulmonary effort is normal    Musculoskeletal:         General: Normal range of motion  Cervical back: Normal range of motion  Skin:     Coloration: Skin is not jaundiced or pale  Neurological:      General: No focal deficit present  Mental Status: She is alert and oriented to person, place, and time  Mental status is at baseline  Psychiatric:         Mood and Affect: Mood normal          Behavior: Behavior normal          Thought Content:  Thought content normal          Judgment: Judgment normal              Current Medications     Current Outpatient Medications:   •  acetaminophen (Tylenol) 325 mg tablet, Take 2 tablets (650 mg total) by mouth every 6 (six) hours as needed for mild pain, Disp: , Rfl: 0  •  Ascorbic Acid (Vitamin C) 500 MG CAPS, Take by mouth, Disp: , Rfl:   •  aspirin (ECOTRIN LOW STRENGTH) 81 mg EC tablet, Take 81 mg by mouth daily, Disp: , Rfl:   •  Cholecalciferol 50 MCG (2000 UT) CAPS, Take 1 capsule by mouth daily , Disp: , Rfl:   •  cyanocobalamin (VITAMIN B-12) 1000 MCG tablet, Take by mouth, Disp: , Rfl:   •  Diclofenac Sodium (VOLTAREN) 1 %, diclofenac 1 % topical gel  APPLY 2 GRAMS TO THE AFFECTED AREA(S) BY TOPICAL ROUTE 4 TIMES PER DAY, Disp: , Rfl:   •  fluticasone (FLONASE) 50 mcg/act nasal spray, 1 spray into each nostril as needed , Disp: , Rfl:   •  leflunomide (ARAVA) 20 MG tablet, Take 20 mg by mouth daily, Disp: , Rfl:   •  Magnesium 100 MG CAPS, Take by mouth daily , Disp: , Rfl:   •  montelukast (SINGULAIR) 10 mg tablet, Take 10 mg by mouth daily, Disp: , Rfl:   •  Multiple Vitamins-Minerals (Multivitamin Gummies Womens) CHEW, Chew 1 each daily , Disp: , Rfl:   •  pantoprazole (PROTONIX) 40 mg tablet, Take 40 mg by mouth daily, Disp: , Rfl:   •  ezetimibe (ZETIA) 10 mg tablet, Take 10 mg by mouth daily , Disp: , Rfl:         Rebecca Casillas MD

## 2022-12-15 NOTE — H&P (VIEW-ONLY)
[address] [facility]  [facility]  [address] [facility]  [place], [address] [place] [address]  [phone]  www  [facility]  org      NAME: Harpreet Munoz  AGE: 63 y o  SEX: female  : 1960   MRN: [id number]    DATE: 12/15/2022  TIME: 9:03 AM    Assessment and Plan:    Left renal calculus, possible retained stent in the kidney itself-it appears that when her stent came out it was not intact  We went over the options which are observation versus cystoscopy left ureteroscopy laser lithotripsy of the fragment surrounding the stent and basketing the stent  After discussion we feel be best to proceed with cystoscopy left ureteroscopy laser lithotripsy and left ureteral stent placement with a string  The risks of bleeding infection damage urinary tract and possible need for additional procedures were explained and she gives informed consent  It is also possible if I am not able to get out this way we may have to try a percutaneous approach but I feel fairly confident that I should be able to get it out  We will schedule this  Chief Complaint   No chief complaint on file  History of Present Illness   Left nephrolithiasis- underwent cystoscopy left ureteroscopy and laser lithotripsy of a partial staghorn calculus by me a year and a half ago  The stent came out on its own at home  Since he did not come out of the office, we do not know if the stent came out intact or not  It appears there may be retained stent fragment up in the kidney itself as seen on the CT scan 2022-however it also could simply be recurrence of stone  We also reviewed her films together  Compared these to May 20, 2021  She said when the stent came out it looked like there was only 1 coil on it  She currently is asymptomatic  Urine was cloudy and we have sent this for culture    It looks like there is a coil or part of a coil up in the kidney with some calcifications attached to  Of note her follow-up renal ultrasound made no mention of any retained stent and there were no stones in that study  The following portions of the patient's history were reviewed and updated as appropriate: allergies, current medications, past family history, past medical history, past social history, past surgical history and problem list   Past Medical History:   Diagnosis Date   • [facility]     Pt had in left breast- had radiation and surgery   • CPAP (continuous positive airway pressure) dependence     Pt uses nightly   • GERD (gastroesophageal reflux disease)    • H/O cervical fracture    • Hyperlipidemia    • Irregular heart beat     Pt is taking Zetia  Pt does not have happen anymore per pt since on medication   • Rheumatoid arthritis ([facility] 75 )    • Seasonal allergies    • Sleep apnea    • Wears glasses      Past Surgical History:   Procedure Laterality Date   • BLADDER SUSPENSION     • BREAST LUMPECTOMY     •  SECTION     • COLONOSCOPY     • EGD     • FL RETROGRADE PYELOGRAM  2021   • FOOT SURGERY Right    • HYSTERECTOMY     • RI CYSTO/URETERO W/LITHOTRIPSY &INDWELL STENT INSRT Left 2021    Procedure: CYSTOSCOPY URETEROSCOPY WITH LITHOTRIPSY HOLMIUM LASER,  AND INSERTION STENT URETERAL;  Surgeon: Thang Stevenson MD;  Location:  MAIN OR;  Service: Urology   • RI CYSTOURETHROSCOPY,URETER CATHETER Left 2021    Procedure: CYSTOSCOPY RETROGRADE PYELOGRAM WITH INSERTION STENT URETERAL;  Surgeon: Daniel Sanders MD;  Location:  MAIN OR;  Service: Urology   • SINUS SURGERY      Deviated septum     shoulder  Review of Systems   Review of Systems   Constitutional: Negative for fever  Genitourinary: Negative  Musculoskeletal: Positive for back pain         Active Problem List     Patient Active Problem List   Diagnosis   • Hydronephrosis of left kidney   • Rheumatoid [facility] ([facility] )   • Gastroesophageal reflux disease without esophagitis   • Malignant neoplasm of female breast ([facility] )   • History of hyperlipidemia   • Obesity (BMI 30 0-34  9)   • Sleep apnea   • Precordial chest pain   • Pericarditis       Objective   /72 (BP Location: Left arm, Patient Position: Sitting)   Pulse 72   Temp (!) 97 3 °F (36 3 °C)   Ht 5' 4" (1 626 m)   Wt 89 4 kg (197 lb)   SpO2 98%   BMI 33 81 kg/m²     Physical Exam  Vitals reviewed  Constitutional:       Appearance: Normal appearance  HENT:      Head: Normocephalic and atraumatic  Eyes:      Extraocular Movements: Extraocular movements intact  Pulmonary:      Effort: Pulmonary effort is normal    Musculoskeletal:         General: Normal range of motion  Cervical back: Normal range of motion  Skin:     Coloration: Skin is not jaundiced or pale  Neurological:      General: No focal deficit present  Mental Status: She is alert and oriented to person, place, and time  Mental status is at baseline  Psychiatric:         Mood and Affect: Mood normal          Behavior: Behavior normal          Thought Content:  Thought content normal          Judgment: Judgment normal              Current Medications     Current Outpatient Medications:   •  acetaminophen (Tylenol) 325 mg tablet, Take 2 tablets (650 mg total) by mouth every 6 (six) hours as needed for mild pain, Disp: , Rfl: 0  •  Ascorbic Acid (Vitamin C) 500 MG CAPS, Take by mouth, Disp: , Rfl:   •  aspirin (ECOTRIN LOW STRENGTH) 81 mg EC tablet, Take 81 mg by mouth daily, Disp: , Rfl:   •  Cholecalciferol 50 MCG (2000 UT) CAPS, Take 1 capsule by mouth daily , Disp: , Rfl:   •  cyanocobalamin (VITAMIN B-12) 1000 MCG tablet, Take by mouth, Disp: , Rfl:   •  Diclofenac Sodium (VOLTAREN) 1 %, diclofenac 1 % topical gel  APPLY 2 GRAMS TO THE AFFECTED AREA(S) BY TOPICAL ROUTE 4 TIMES PER DAY, Disp: , Rfl:   •  fluticasone (FLONASE) 50 mcg/act nasal spray, 1 spray into each nostril as needed , Disp: , Rfl:   •  leflunomide (ARAVA) 20 MG tablet, Take 20 mg by mouth daily, Disp: , Rfl:   •  Magnesium 100 MG CAPS, Take by mouth daily , Disp: , Rfl:   •  montelukast (SINGULAIR) 10 mg tablet, Take 10 mg by mouth daily, Disp: , Rfl:   •  Multiple Vitamins-Minerals (Multivitamin Gummies Womens) CHEW, Chew 1 each daily , Disp: , Rfl:   •  pantoprazole (PROTONIX) 40 mg tablet, Take 40 mg by mouth daily, Disp: , Rfl:   •  ezetimibe (ZETIA) 10 mg tablet, Take 10 mg by mouth daily , Disp: , Rfl:         Jaswinder Lester MD

## 2022-12-15 NOTE — PROGRESS NOTES
[address] [facility]  [facility]  [address] [facility]  [place], [address] [place] [address]  [phone]  www  [facility]  org      NAME: Bri Wilks  AGE: 63 y o  SEX: female  : 1960   MRN: [id number]    DATE: 12/15/2022  TIME: 9:03 AM    Assessment and Plan:    Left renal calculus, possible retained stent in the kidney itself-it appears that when her stent came out it was not intact  We went over the options which are observation versus cystoscopy left ureteroscopy laser lithotripsy of the fragment surrounding the stent and basketing the stent  After discussion we feel be best to proceed with cystoscopy left ureteroscopy laser lithotripsy and left ureteral stent placement with a string  The risks of bleeding infection damage urinary tract and possible need for additional procedures were explained and she gives informed consent  It is also possible if I am not able to get out this way we may have to try a percutaneous approach but I feel fairly confident that I should be able to get it out  We will schedule this  Chief Complaint   No chief complaint on file  History of Present Illness   Left nephrolithiasis- underwent cystoscopy left ureteroscopy and laser lithotripsy of a partial staghorn calculus by me a year and a half ago  The stent came out on its own at home  Since he did not come out of the office, we do not know if the stent came out intact or not  It appears there may be retained stent fragment up in the kidney itself as seen on the CT scan 2022-however it also could simply be recurrence of stone  We also reviewed her films together  Compared these to May 20, 2021  She said when the stent came out it looked like there was only 1 coil on it  She currently is asymptomatic  Urine was cloudy and we have sent this for culture    It looks like there is a coil or part of a coil up in the kidney with some calcifications attached to  Of note her follow-up renal ultrasound made no mention of any retained stent and there were no stones in that study  The following portions of the patient's history were reviewed and updated as appropriate: allergies, current medications, past family history, past medical history, past social history, past surgical history and problem list   Past Medical History:   Diagnosis Date   • Breast cancer ([facility] )     Pt had in left breast- had radiation and surgery   • CPAP (continuous positive airway pressure) dependence     Pt uses nightly   • GERD (gastroesophageal reflux disease)    • H/O cervical fracture    • Hyperlipidemia    • Irregular heart beat     Pt is taking Zetia  Pt does not have happen anymore per pt since on medication   • Rheumatoid arthritis ([facility] 75 )    • Seasonal allergies    • Sleep apnea    • Wears glasses      Past Surgical History:   Procedure Laterality Date   • BLADDER SUSPENSION     • BREAST LUMPECTOMY     •  SECTION     • COLONOSCOPY     • EGD     • FL RETROGRADE PYELOGRAM  2021   • FOOT SURGERY Right    • HYSTERECTOMY     • [place] CYSTO/URETERO W/LITHOTRIPSY &INDWELL STENT INSRT Left 2021    Procedure: CYSTOSCOPY URETEROSCOPY WITH LITHOTRIPSY HOLMIUM LASER,  AND INSERTION STENT URETERAL;  Surgeon: Debbie Avila MD;  Location:  MAIN OR;  Service: Urology   • NV CYSTOURETHROSCOPY,URETER CATHETER Left 2021    Procedure: CYSTOSCOPY RETROGRADE PYELOGRAM WITH INSERTION STENT URETERAL;  Surgeon: Daniel Lin MD;  Location:  MAIN OR;  Service: Urology   • SINUS SURGERY      Deviated septum     shoulder  Review of Systems   Review of Systems   Constitutional: Negative for fever  Genitourinary: Negative  Musculoskeletal: Positive for back pain         Active Problem List     Patient Active Problem List   Diagnosis   • Hydronephrosis of left kidney   • Rheumatoid arthritis ([facility] )   • Gastroesophageal reflux disease without esophagitis   • Malignant neoplasm of female breast ([facility] )   • History of hyperlipidemia   • Obesity (BMI 30 0-34  9)   • Sleep apnea   • Precordial chest pain   • Pericarditis       Objective   /72 (BP Location: Left arm, Patient Position: Sitting)   Pulse 72   Temp (!) 97 3 °F (36 3 °C)   Ht 5' 4" (1 626 m)   Wt 89 4 kg (197 lb)   SpO2 98%   BMI 33 81 kg/m²     Physical Exam  Vitals reviewed  Constitutional:       Appearance: Normal appearance  HENT:      Head: Normocephalic and atraumatic  Eyes:      Extraocular Movements: Extraocular movements intact  Pulmonary:      Effort: Pulmonary effort is normal    Musculoskeletal:         General: Normal range of motion  Cervical back: Normal range of motion  Skin:     Coloration: Skin is not jaundiced or pale  Neurological:      General: No focal deficit present  Mental Status: She is alert and oriented to person, place, and time  Mental status is at baseline  Psychiatric:         Mood and Affect: Mood normal          Behavior: Behavior normal          Thought Content:  Thought content normal          Judgment: Judgment normal              Current Medications     Current Outpatient Medications:   •  acetaminophen (Tylenol) 325 mg tablet, Take 2 tablets (650 mg total) by mouth every 6 (six) hours as needed for mild pain, Disp: , Rfl: 0  •  Ascorbic Acid (Vitamin C) 500 MG CAPS, Take by mouth, Disp: , Rfl:   •  aspirin (ECOTRIN LOW STRENGTH) 81 mg EC tablet, Take 81 mg by mouth daily, Disp: , Rfl:   •  Cholecalciferol 50 MCG (2000 UT) CAPS, Take 1 capsule by mouth daily , Disp: , Rfl:   •  cyanocobalamin (VITAMIN B-12) 1000 MCG tablet, Take by mouth, Disp: , Rfl:   •  Diclofenac Sodium (VOLTAREN) 1 %, diclofenac 1 % topical gel  APPLY 2 GRAMS TO THE AFFECTED AREA(S) BY TOPICAL ROUTE 4 TIMES PER DAY, Disp: , Rfl:   •  fluticasone (FLONASE) 50 mcg/act nasal spray, 1 spray into each nostril as needed , Disp: , Rfl:   •  leflunomide (ARAVA) 20 MG tablet, Take 20 mg by mouth daily, Disp: , Rfl:   •  Magnesium 100 MG CAPS, Take by mouth daily , Disp: , Rfl:   •  montelukast (SINGULAIR) 10 mg tablet, Take 10 mg by mouth daily, Disp: , Rfl:   •  Multiple Vitamins-Minerals (Multivitamin Gummies Womens) CHEW, Chew 1 each daily , Disp: , Rfl:   •  pantoprazole (PROTONIX) 40 mg tablet, Take 40 mg by mouth daily, Disp: , Rfl:   •  ezetimibe (ZETIA) 10 mg tablet, Take 10 mg by mouth daily , Disp: , Rfl:         Severa Chol, MD

## 2022-12-18 LAB — BACTERIA UR CULT: ABNORMAL

## 2022-12-19 ENCOUNTER — TELEPHONE (OUTPATIENT)
Dept: UROLOGY | Facility: CLINIC | Age: 62
End: 2022-12-19

## 2022-12-19 DIAGNOSIS — N30.00 ACUTE CYSTITIS WITHOUT HEMATURIA: Primary | ICD-10-CM

## 2022-12-19 RX ORDER — CEPHALEXIN 500 MG/1
500 CAPSULE ORAL EVERY 6 HOURS SCHEDULED
Qty: 28 CAPSULE | Refills: 0 | Status: SHIPPED | OUTPATIENT
Start: 2022-12-19 | End: 2022-12-26

## 2022-12-19 NOTE — TELEPHONE ENCOUNTER
----- Message from Gloria Kevin MD sent at 12/19/2022  9:42 AM EST -----  Please let her know I sent Keflex to pharmacy- she has PCN allergy, but there is low cross reactivity - I don't have other options

## 2022-12-19 NOTE — TELEPHONE ENCOUNTER
Patient identified by name/  Pt aware of urine culture results  Aware script was sent to pharmacy     She wanted to know when she is going to hear something regarding the surgery to have stents removed

## 2022-12-19 NOTE — TELEPHONE ENCOUNTER
Called patient and confirmed 12/21/2022 at [address] [place] [address] with Dr Parth Musa (see note from PB below)  Patient is aware she will need a , nothing to eat or drink after midnight, the hospital will call the day prior with time of arrival, and requested the surgery packet be mailed to her work email  [email or web address]  pa us

## 2022-12-19 NOTE — TELEPHONE ENCOUNTER
Message  Received:  Today  [facility]  Actually, can we add her on for Wednesday at [address] [facility]

## 2022-12-20 ENCOUNTER — ANESTHESIA EVENT (OUTPATIENT)
Dept: PERIOP | Facility: HOSPITAL | Age: 62
End: 2022-12-20

## 2022-12-20 NOTE — PRE-PROCEDURE INSTRUCTIONS
Pre-Surgery Instructions:   Medication Instructions   • acetaminophen (Tylenol) 325 mg tablet Instructions provided by MD   • Ascorbic Acid (Vitamin C) 500 MG CAPS Hold day of surgery  • cephalexin (KEFLEX) 500 mg capsule Hold day of surgery  • Cholecalciferol 50 MCG (2000 UT) CAPS Hold day of surgery  • cyanocobalamin (VITAMIN B-12) 1000 MCG tablet Hold day of surgery  • Diclofenac Sodium (VOLTAREN) 1 % Hold day of surgery  • ezetimibe (ZETIA) 10 mg tablet Take day of surgery  • fluticasone (FLONASE) 50 mcg/act nasal spray Take day of surgery  • leflunomide (ARAVA) 20 MG tablet Hold day of surgery  • montelukast (SINGULAIR) 10 mg tablet Take day of surgery  • Multiple Vitamins-Minerals (Multivitamin Gummies Womens) CHEW Hold day of surgery  • pantoprazole (PROTONIX) 40 mg tablet Take day of surgery     See above    Am meds can be taken with a small sip of water

## 2022-12-20 NOTE — ANESTHESIA PREPROCEDURE EVALUATION
Procedure:  CYSTOSCOPY URETEROSCOPY WITH LITHOTRIPSY HOLMIUM LASER, RETROGRADE PYELOGRAM AND INSERTION STENT URETERAL (Left: Ureter)    Relevant Problems   CARDIO   (+) Precordial chest pain      GI/HEPATIC   (+) Gastroesophageal reflux disease without esophagitis      /RENAL   (+) Hydronephrosis of left kidney      GYN   (+) Malignant neoplasm of female breast (HCC)      MUSCULOSKELETAL   (+) Rheumatoid arthritis (HCC)      NEURO/PSYCH   (+) History of hyperlipidemia      PULMONARY   (+) Sleep apnea      11/28/22 Normal sinus rhythm  Normal ECG  When compared with ECG of 21-FEB-2022 19:23,  No significant change was found  Physical Exam    Airway    Mallampati score: II  TM Distance: >3 FB  Neck ROM: full     Dental   No notable dental hx     Cardiovascular  Cardiovascular exam normal    Pulmonary  Pulmonary exam normal     Other Findings        Anesthesia Plan  ASA Score- 2     Anesthesia Type- general with ASA Monitors  Additional Monitors:   Airway Plan: LMA  Plan Factors-Exercise tolerance (METS): >4 METS  Chart reviewed  EKG reviewed  Imaging results reviewed  Existing labs reviewed  Patient summary reviewed  Patient is not a current smoker  Patient not instructed to abstain from smoking on day of procedure  Patient did not smoke on day of surgery  Obstructive sleep apnea risk education given perioperatively  Induction- intravenous  Postoperative Plan-     Informed Consent- Anesthetic plan and risks discussed with patient  I personally reviewed this patient with the CRNA  Discussed and agreed on the Anesthesia Plan with the MARYAN Estrada

## 2022-12-21 ENCOUNTER — APPOINTMENT (OUTPATIENT)
Dept: RADIOLOGY | Facility: HOSPITAL | Age: 62
End: 2022-12-21

## 2022-12-21 ENCOUNTER — ANESTHESIA (OUTPATIENT)
Dept: PERIOP | Facility: HOSPITAL | Age: 62
End: 2022-12-21

## 2022-12-21 ENCOUNTER — PREP FOR PROCEDURE (OUTPATIENT)
Dept: INTERVENTIONAL RADIOLOGY/VASCULAR | Facility: CLINIC | Age: 62
End: 2022-12-21

## 2022-12-21 ENCOUNTER — HOSPITAL ENCOUNTER (OUTPATIENT)
Facility: HOSPITAL | Age: 62
Setting detail: OUTPATIENT SURGERY
Discharge: HOME | End: 2022-12-21
Attending: UROLOGY | Admitting: UROLOGY

## 2022-12-21 VITALS
SYSTOLIC BLOOD PRESSURE: 137 MMHG | TEMPERATURE: 97.8 F | WEIGHT: 197 LBS | HEIGHT: 64 IN | RESPIRATION RATE: 20 BRPM | BODY MASS INDEX: 33.63 KG/M2 | DIASTOLIC BLOOD PRESSURE: 79 MMHG | HEART RATE: 81 BPM | OXYGEN SATURATION: 97 %

## 2022-12-21 DIAGNOSIS — N20.0 RENAL CALCULUS, LEFT: ICD-10-CM

## 2022-12-21 DIAGNOSIS — N20.0 NEPHROLITHIASIS: Primary | ICD-10-CM

## 2022-12-21 DIAGNOSIS — Z96.0 RETAINED URETERAL STENT: ICD-10-CM

## 2022-12-21 DEVICE — INLAY OPTIMA URETERAL STENT W/O GUIDEWIRE
Type: IMPLANTABLE DEVICE | Site: URETER | Status: FUNCTIONAL
Brand: BARD® INLAY OPTIMA® URETERAL STENT

## 2022-12-21 RX ORDER — HYDROCODONE BITARTRATE AND ACETAMINOPHEN 5; 325 MG/1; MG/1
1 TABLET ORAL EVERY 6 HOURS PRN
Status: DISCONTINUED | OUTPATIENT
Start: 2022-12-21 | End: 2022-12-21 | Stop reason: HOSPADM

## 2022-12-21 RX ORDER — LEVOFLOXACIN 5 MG/ML
500 INJECTION, SOLUTION INTRAVENOUS ONCE
OUTPATIENT
Start: 2022-12-21 | End: 2022-12-21

## 2022-12-21 RX ORDER — FENTANYL CITRATE/PF 50 MCG/ML
50 SYRINGE (ML) INJECTION
Status: DISCONTINUED | OUTPATIENT
Start: 2022-12-21 | End: 2022-12-21 | Stop reason: HOSPADM

## 2022-12-21 RX ORDER — OXYBUTYNIN CHLORIDE 5 MG/1
5 TABLET ORAL 3 TIMES DAILY PRN
Qty: 20 TABLET | Refills: 0 | Status: SHIPPED | OUTPATIENT
Start: 2022-12-21

## 2022-12-21 RX ORDER — FENTANYL CITRATE 50 UG/ML
INJECTION, SOLUTION INTRAMUSCULAR; INTRAVENOUS AS NEEDED
Status: DISCONTINUED | OUTPATIENT
Start: 2022-12-21 | End: 2022-12-21

## 2022-12-21 RX ORDER — MAGNESIUM HYDROXIDE 1200 MG/15ML
LIQUID ORAL AS NEEDED
Status: DISCONTINUED | OUTPATIENT
Start: 2022-12-21 | End: 2022-12-21 | Stop reason: HOSPADM

## 2022-12-21 RX ORDER — MIDAZOLAM HYDROCHLORIDE 2 MG/2ML
INJECTION, SOLUTION INTRAMUSCULAR; INTRAVENOUS AS NEEDED
Status: DISCONTINUED | OUTPATIENT
Start: 2022-12-21 | End: 2022-12-21

## 2022-12-21 RX ORDER — ONDANSETRON 2 MG/ML
INJECTION INTRAMUSCULAR; INTRAVENOUS AS NEEDED
Status: DISCONTINUED | OUTPATIENT
Start: 2022-12-21 | End: 2022-12-21

## 2022-12-21 RX ORDER — PHENAZOPYRIDINE HYDROCHLORIDE 200 MG/1
200 TABLET, FILM COATED ORAL EVERY 8 HOURS PRN
Qty: 10 TABLET | Refills: 0 | Status: SHIPPED | OUTPATIENT
Start: 2022-12-21

## 2022-12-21 RX ORDER — ONDANSETRON 2 MG/ML
4 INJECTION INTRAMUSCULAR; INTRAVENOUS ONCE AS NEEDED
Status: DISCONTINUED | OUTPATIENT
Start: 2022-12-21 | End: 2022-12-21 | Stop reason: HOSPADM

## 2022-12-21 RX ORDER — HYDROCODONE BITARTRATE AND ACETAMINOPHEN 5; 325 MG/1; MG/1
1 TABLET ORAL EVERY 4 HOURS PRN
Qty: 20 TABLET | Refills: 0 | Status: SHIPPED | OUTPATIENT
Start: 2022-12-21 | End: 2022-12-31

## 2022-12-21 RX ORDER — SODIUM CHLORIDE 9 MG/ML
INJECTION, SOLUTION INTRAVENOUS AS NEEDED
Status: DISCONTINUED | OUTPATIENT
Start: 2022-12-21 | End: 2022-12-21 | Stop reason: HOSPADM

## 2022-12-21 RX ORDER — LIDOCAINE HYDROCHLORIDE 10 MG/ML
INJECTION, SOLUTION EPIDURAL; INFILTRATION; INTRACAUDAL; PERINEURAL AS NEEDED
Status: DISCONTINUED | OUTPATIENT
Start: 2022-12-21 | End: 2022-12-21

## 2022-12-21 RX ORDER — PHENAZOPYRIDINE HYDROCHLORIDE 100 MG/1
200 TABLET, FILM COATED ORAL ONCE
Status: COMPLETED | OUTPATIENT
Start: 2022-12-21 | End: 2022-12-21

## 2022-12-21 RX ORDER — SODIUM CHLORIDE, SODIUM LACTATE, POTASSIUM CHLORIDE, CALCIUM CHLORIDE 600; 310; 30; 20 MG/100ML; MG/100ML; MG/100ML; MG/100ML
75 INJECTION, SOLUTION INTRAVENOUS CONTINUOUS
Status: DISCONTINUED | OUTPATIENT
Start: 2022-12-21 | End: 2022-12-21 | Stop reason: HOSPADM

## 2022-12-21 RX ORDER — PROPOFOL 10 MG/ML
INJECTION, EMULSION INTRAVENOUS AS NEEDED
Status: DISCONTINUED | OUTPATIENT
Start: 2022-12-21 | End: 2022-12-21

## 2022-12-21 RX ORDER — DEXAMETHASONE SODIUM PHOSPHATE 10 MG/ML
INJECTION, SOLUTION INTRAMUSCULAR; INTRAVENOUS AS NEEDED
Status: DISCONTINUED | OUTPATIENT
Start: 2022-12-21 | End: 2022-12-21

## 2022-12-21 RX ORDER — OXYBUTYNIN CHLORIDE 5 MG/1
5 TABLET ORAL 3 TIMES DAILY PRN
Status: DISCONTINUED | OUTPATIENT
Start: 2022-12-21 | End: 2022-12-21 | Stop reason: HOSPADM

## 2022-12-21 RX ORDER — CLINDAMYCIN PHOSPHATE 900 MG/50ML
900 INJECTION INTRAVENOUS ONCE
Status: COMPLETED | OUTPATIENT
Start: 2022-12-21 | End: 2022-12-21

## 2022-12-21 RX ORDER — SODIUM CHLORIDE, SODIUM LACTATE, POTASSIUM CHLORIDE, CALCIUM CHLORIDE 600; 310; 30; 20 MG/100ML; MG/100ML; MG/100ML; MG/100ML
INJECTION, SOLUTION INTRAVENOUS CONTINUOUS PRN
Status: DISCONTINUED | OUTPATIENT
Start: 2022-12-21 | End: 2022-12-21

## 2022-12-21 RX ADMIN — CLINDAMYCIN IN 5 PERCENT DEXTROSE 900 MG: 18 INJECTION, SOLUTION INTRAVENOUS at 09:00

## 2022-12-21 RX ADMIN — PROPOFOL 180 MG: 10 INJECTION, EMULSION INTRAVENOUS at 09:08

## 2022-12-21 RX ADMIN — OXYBUTYNIN CHLORIDE 5 MG: 5 TABLET ORAL at 11:29

## 2022-12-21 RX ADMIN — FENTANYL CITRATE 25 MCG: 50 INJECTION INTRAMUSCULAR; INTRAVENOUS at 10:07

## 2022-12-21 RX ADMIN — SODIUM CHLORIDE, POTASSIUM CHLORIDE, SODIUM LACTATE AND CALCIUM CHLORIDE: 600; 310; 30; 20 INJECTION, SOLUTION INTRAVENOUS at 09:04

## 2022-12-21 RX ADMIN — ONDANSETRON 4 MG: 2 INJECTION INTRAMUSCULAR; INTRAVENOUS at 09:12

## 2022-12-21 RX ADMIN — MIDAZOLAM 2 MG: 1 INJECTION INTRAMUSCULAR; INTRAVENOUS at 09:04

## 2022-12-21 RX ADMIN — PHENAZOPYRIDINE 200 MG: 100 TABLET ORAL at 11:29

## 2022-12-21 RX ADMIN — AZTREONAM 1000 MG: 1 INJECTION, POWDER, LYOPHILIZED, FOR SOLUTION INTRAMUSCULAR; INTRAVENOUS at 09:14

## 2022-12-21 RX ADMIN — FENTANYL CITRATE 25 MCG: 50 INJECTION INTRAMUSCULAR; INTRAVENOUS at 09:08

## 2022-12-21 RX ADMIN — LIDOCAINE HYDROCHLORIDE 50 MG: 10 INJECTION, SOLUTION EPIDURAL; INFILTRATION; INTRACAUDAL; PERINEURAL at 09:08

## 2022-12-21 RX ADMIN — SODIUM CHLORIDE, POTASSIUM CHLORIDE, SODIUM LACTATE AND CALCIUM CHLORIDE: 600; 310; 30; 20 INJECTION, SOLUTION INTRAVENOUS at 10:03

## 2022-12-21 RX ADMIN — DEXAMETHASONE SODIUM PHOSPHATE 10 MG: 10 INJECTION, SOLUTION INTRAMUSCULAR; INTRAVENOUS at 09:12

## 2022-12-21 NOTE — QUICK NOTE
Patient underwent cystoscopy left ureteroscopy laser lithotripsy of recurrent stone, and attempted basket of retained proximal coil of the ureteral stent that contains wire inside  My Rexanne Clear is that the wire may have sheared during the last case, weakening  the wire and the patient expelled the stent a few days later, and it appears that the proximal coil broke at that time  In any case I was unable to remove foreign bodies with the baskets that had ureteroscopically  Therefore she will need interventional radiology to place a nephrostomy tube preferably with through and through access and she currently has a stent in place  I will then perform left PCNL with more robust graspers to remove the foreign body and to clear out any remaining stone fragments that she has  She is a rapid stone former  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

## 2022-12-21 NOTE — ANESTHESIA POSTPROCEDURE EVALUATION
Post-Op Assessment Note    CV Status:  Stable    Pain management: satisfactory to patient     Mental Status:  Alert and awake   PONV Controlled:  None   Airway Patency:  Patent      Post Op Vitals Reviewed: Yes      Staff: CRNA         No notable events documented      BP   151/83   Temp   97 5   Pulse  84   Resp   16   SpO2   99

## 2022-12-21 NOTE — DISCHARGE INSTR - AVS FIRST PAGE
Expect to see blood in the urine, and to experience urgency/frequency/burning with urination and dribbling  This is normal after urological procedures  Is also normal to experience some nausea after these procedures  Go back your regular diet carefully  It is normal to feel pain in the kidney when urinating and when the bladder is filling due to urine refluxing up to the kidney because of the open stent  The stent is necessary to keep the ureter open to allow clots and swelling to resolve and to allow the kidney to drain properly after instrumentation  Some stones may pass around the stent, but most stones pass after the stent is removed  The presence of the stent makes the ureter wider while it is in and after has been removed, allowing passage of larger fragments  I destroyed an extensive amount of stone in your left kidney  Most of the time of the case, it was almost an hour and a half spent breaking up new stone  You do have a piece of the previous stent with a piece of wire inside of his in your kidney  I was unable to extract this with a basket  Therefore we will need to do the procedure directly into the kidney with a procedure called a percutaneous nephrostolithotomy  In that procedure I will evacuate the remainder of the fragments of stone, and I will grasp the stent with wire with heavier forceps that I am able to use this type of procedure and pull it all out  That will prevent future stone formation from the retained stent coil alone, but you still may form new stones because you do tend to form new stones quite rapidly  Our office will contact you for these arrangements  First I will of interventional radiology placing nephrostomy tube into the left kidney as an outpatient procedure in interventional radiology  Then we will set up an operating room date for I will go through that tube and perform the procedure I described above      Call for fever greater that 101 5, inability to urinate, prolonged nausea and vomiting, or severe pain not relieved by pain medications      No driving/operating machinery for 24 hours, and while taking narcotics  Take over the counter remedy of choice to avoid constipation  Drink plenty of fluids

## 2022-12-21 NOTE — OP NOTE
OPERATIVE REPORT  PATIENT NAME: Ranjan Wynn    :  1960  MRN: [id number]  Pt Location: [facility] OR ROOM 01    SURGERY DATE: 2022    Surgeon(s) and Role:     * Gloria Kevin MD - Primary    Preop Diagnosis:  Renal calculus, left [N20 0]  Retained ureteral stent [Z96 0]    Post-Op Diagnosis Codes:     * Renal calculus, left [N20 0]     * Retained ureteral stent [Z96 0]  Approximately 2 cm renal calculi regrowth in the left renal pelvis and calyces, and proximal coil previous stent with wire inside  Unable to retrieved with basket  Procedure(s) (LRB):  CYSTOSCOPY URETEROSCOPY WITH LITHOTRIPSY HOLMIUM LASER,  INSERTION STENT URETERAL, attempted removal of foreign body (Left)    Specimen(s):  ID Type Source Tests Collected by Time Destination   A :  Calculus Ureter, Left STONE ANALYSIS Gloria Kevin MD 2022 [id number]        Estimated Blood Loss:   Minimal    Drains:  * No LDAs found *    Anesthesia Type:   General/LMA    Operative Indications:  Renal calculus, left [N20 0]  Retained ureteral stent [Z96 0]      Operative Findings:  Proximal coil of stent with wire inside  Some of this was broken down  Remainder I could not basket  Large amount of recurrence of stone, about 2 cm, broken up with the laser into tiny fragments  Plan is to do left PCNL in the near future with nephrostomy tube placed in the interim  The PCNL will evacuate the fragments and I can use more robust graspers to pull out the foreign bodies  Complications:   None    Procedure and Technique:  Left renal calculus, possible retained stent in the kidney itself-it appears that when her stent came out it was not intact  We went over the options which are observation versus cystoscopy left ureteroscopy laser lithotripsy of the fragment surrounding the stent and basketing the stent  After discussion we feel be best to proceed with cystoscopy left ureteroscopy laser lithotripsy and left ureteral stent placement with a string    The risks of bleeding infection damage urinary tract and possible need for additional procedures were explained and she gives informed consent  It is also possible if I am not able to get out this way we may have to try a percutaneous approach but I feel fairly confident that I should be able to get it out  Procedure and Technique:     The patient was brought to the operating room and identified properly  The patient was prepared and draped in the dorsolithotomy position after general anesthesia was induced, and a time out performed  Care was taken during positioning to protect all extremities  Cystoscopy was performed with 22 Uzbek cysto sheath and 30  degree lens  The urethra was normal without stricture  The bladder was smooth, nontrabeculated and there were no stones, tumors or other abnormalities  The 0 035 inch wire was fed into the left ureteral orifice and fed up into the renal pelvis  Fluoroscopy revealed a radioopaque/radiolucent stone  A dual lumen catheter was placed over the wire fluoroscopically into the proximal ureter, and a second wire was deployed  Using one wire clamped to the drapes as a safety wire, a 10 Uzbek 35 cm access sheath was placed, and the flexible ureteroscope was advanced  The end of the proximal coil of the stent was seen, along with a lot of new stone growth, and holmium laser lithotripsy was carried out with the 200 micron holmium laser fiber, breaking the stone up into small fragments  I did break down some of the stent and broke down some of the stone that was on stent and even broke down some of the wire that was on the inside of the stent  However I was unable using a series of baskets retract the proximal coil of the stent and wire and pull it down the ureter  Approximately 1 5 hours was spent doing all this  A 6 Chinese by 26 cm ureteral stent with no string was deployed, and coils were established in the renal pelvis and bladder   The bladder was drained with the cysto sheath,  and the pt awakened               I was present for the entire procedure and A qualified resident physician was not available    Patient Disposition:  PACU  and extubated and stable        SIGNATURE: Nehal Boo MD  DATE: December 21, 2022  TIME: 5:23 PM

## 2022-12-22 ENCOUNTER — TELEPHONE (OUTPATIENT)
Dept: UROLOGY | Facility: CLINIC | Age: 62
End: 2022-12-22

## 2022-12-22 NOTE — TELEPHONE ENCOUNTER
Post Op Note    Maty Marie is a 63 y o  female s/p CYSTOSCOPY URETEROSCOPY WITH LITHOTRIPSY HOLMIUM LASER,  INSERTION STENT URETERAL, attempted removal of foreign body (Left: Ureter) performed 12/21/22 by Dr Rae Speaks  How would you rate your pain on a scale from 1 to 10, 10 being the worst pain ever? Patient reports bladder spasms when she is up moving around    Have you had a fever? No    Do you have any difficulty urinating? No    Do you have any other questions or concerns that I can address at this time? Patient states she is feeling pretty good s/p procedure  She denies pain, however reports bladder spasms when she is up moving around  She is taking pyridium and oxybutynin prn in addition to antibiotic  She has not needed any [place]  Patient hydrating well with water  She is aware of need for additional procedures and that surgery scheduler will contact her to schedule

## 2022-12-25 ENCOUNTER — PATIENT MESSAGE (OUTPATIENT)
Dept: UROLOGY | Facility: CLINIC | Age: 62
End: 2022-12-25

## 2022-12-26 ENCOUNTER — HOSPITAL ENCOUNTER (EMERGENCY)
Facility: HOSPITAL | Age: 62
Discharge: HOME | End: 2022-12-26
Attending: EMERGENCY MEDICINE

## 2022-12-26 ENCOUNTER — APPOINTMENT (EMERGENCY)
Dept: CT IMAGING | Facility: HOSPITAL | Age: 62
End: 2022-12-26

## 2022-12-26 VITALS
BODY MASS INDEX: 34.1 KG/M2 | WEIGHT: 199.74 LBS | SYSTOLIC BLOOD PRESSURE: 163 MMHG | OXYGEN SATURATION: 97 % | TEMPERATURE: 98 F | HEART RATE: 86 BPM | HEIGHT: 64 IN | DIASTOLIC BLOOD PRESSURE: 94 MMHG | RESPIRATION RATE: 18 BRPM

## 2022-12-26 DIAGNOSIS — N39.0 UTI (URINARY TRACT INFECTION): Primary | ICD-10-CM

## 2022-12-26 DIAGNOSIS — R74.01 ELEVATED ALT MEASUREMENT: ICD-10-CM

## 2022-12-26 DIAGNOSIS — E87.6 HYPOKALEMIA: ICD-10-CM

## 2022-12-26 LAB
ALBUMIN SERPL BCP-MCNC: 2.8 G/DL (ref 3.5–5)
ALP SERPL-CCNC: 278 U/L (ref 46–116)
ALT SERPL W P-5'-P-CCNC: 102 U/L (ref 12–78)
ANION GAP SERPL CALCULATED.3IONS-SCNC: 11 MMOL/L (ref 4–13)
AST SERPL W P-5'-P-CCNC: 43 U/L (ref 5–45)
BACTERIA UR QL AUTO: ABNORMAL /HPF
BASOPHILS # BLD MANUAL: 0 THOUSAND/UL (ref 0–0.1)
BASOPHILS NFR MAR MANUAL: 0 % (ref 0–1)
BILIRUB SERPL-MCNC: 0.51 MG/DL (ref 0.2–1)
BILIRUB UR QL STRIP: ABNORMAL
BUN SERPL-MCNC: 19 MG/DL (ref 5–25)
CALCIUM ALBUM COR SERPL-MCNC: 10.3 MG/DL (ref 8.3–10.1)
CALCIUM SERPL-MCNC: 9.3 MG/DL (ref 8.3–10.1)
CHLORIDE SERPL-SCNC: 103 MMOL/L (ref 96–108)
CLARITY UR: ABNORMAL
CO2 SERPL-SCNC: 26 MMOL/L (ref 21–32)
COLOR UR: ABNORMAL
CREAT SERPL-MCNC: 1.28 MG/DL (ref 0.6–1.3)
EOSINOPHIL # BLD MANUAL: 1.13 THOUSAND/UL (ref 0–0.4)
EOSINOPHIL NFR BLD MANUAL: 14 % (ref 0–6)
ERYTHROCYTE [DISTWIDTH] IN BLOOD BY AUTOMATED COUNT: 13 % (ref 11.6–15.1)
GFR SERPL CREATININE-BSD FRML MDRD: 45 ML/MIN/1.73SQ M
GLUCOSE SERPL-MCNC: 115 MG/DL (ref 65–140)
GLUCOSE UR STRIP-MCNC: NEGATIVE MG/DL
HCT VFR BLD AUTO: 35.7 % (ref 34.8–46.1)
HGB BLD-MCNC: 11.4 G/DL (ref 11.5–15.4)
HGB UR QL STRIP.AUTO: ABNORMAL
KETONES UR STRIP-MCNC: NEGATIVE MG/DL
LACTATE SERPL-SCNC: 0.8 MMOL/L (ref 0.5–2)
LEUKOCYTE ESTERASE UR QL STRIP: ABNORMAL
LIPASE SERPL-CCNC: 125 U/L (ref 73–393)
LYMPHOCYTES # BLD AUTO: 1.21 THOUSAND/UL (ref 0.6–4.47)
LYMPHOCYTES # BLD AUTO: 15 % (ref 14–44)
MAGNESIUM SERPL-MCNC: 2 MG/DL (ref 1.6–2.6)
MCH RBC QN AUTO: 27.8 PG (ref 26.8–34.3)
MCHC RBC AUTO-ENTMCNC: 31.9 G/DL (ref 31.4–37.4)
MCV RBC AUTO: 87 FL (ref 82–98)
MONOCYTES # BLD AUTO: 0.72 THOUSAND/UL (ref 0–1.22)
MONOCYTES NFR BLD: 9 % (ref 4–12)
NEUTROPHILS # BLD MANUAL: 4.91 THOUSAND/UL (ref 1.85–7.62)
NEUTS SEG NFR BLD AUTO: 61 % (ref 43–75)
NITRITE UR QL STRIP: POSITIVE
NON-SQ EPI CELLS URNS QL MICRO: ABNORMAL /HPF
PH UR STRIP.AUTO: 6 [PH]
PLATELET # BLD AUTO: 307 THOUSANDS/UL (ref 149–390)
PLATELET BLD QL SMEAR: ADEQUATE
PMV BLD AUTO: 9.8 FL (ref 8.9–12.7)
POTASSIUM SERPL-SCNC: 3.3 MMOL/L (ref 3.5–5.3)
PROT SERPL-MCNC: 7.5 G/DL (ref 6.4–8.4)
PROT UR STRIP-MCNC: ABNORMAL MG/DL
RBC # BLD AUTO: 4.1 MILLION/UL (ref 3.81–5.12)
RBC #/AREA URNS AUTO: ABNORMAL /HPF
SODIUM SERPL-SCNC: 140 MMOL/L (ref 135–147)
SP GR UR STRIP.AUTO: 1.02 (ref 1–1.03)
UROBILINOGEN UR QL STRIP.AUTO: 1 E.U./DL
VARIANT LYMPHS # BLD AUTO: 1 %
WBC # BLD AUTO: 8.05 THOUSAND/UL (ref 4.31–10.16)
WBC #/AREA URNS AUTO: ABNORMAL /HPF

## 2022-12-26 RX ORDER — ONDANSETRON 4 MG/1
4 TABLET, ORALLY DISINTEGRATING ORAL EVERY 6 HOURS PRN
Qty: 12 TABLET | Refills: 0 | Status: SHIPPED | OUTPATIENT
Start: 2022-12-26

## 2022-12-26 RX ORDER — POTASSIUM CHLORIDE 20 MEQ/1
20 TABLET, EXTENDED RELEASE ORAL ONCE
Status: COMPLETED | OUTPATIENT
Start: 2022-12-26 | End: 2022-12-26

## 2022-12-26 RX ORDER — ONDANSETRON 2 MG/ML
4 INJECTION INTRAMUSCULAR; INTRAVENOUS ONCE
Status: COMPLETED | OUTPATIENT
Start: 2022-12-26 | End: 2022-12-26

## 2022-12-26 RX ADMIN — SODIUM CHLORIDE 1000 ML: 0.9 INJECTION, SOLUTION INTRAVENOUS at 12:58

## 2022-12-26 RX ADMIN — ONDANSETRON 4 MG: 2 INJECTION INTRAMUSCULAR; INTRAVENOUS at 12:58

## 2022-12-26 RX ADMIN — POTASSIUM CHLORIDE 20 MEQ: 1500 TABLET, EXTENDED RELEASE ORAL at 14:07

## 2022-12-26 NOTE — DISCHARGE INSTRUCTIONS
Continue the Keflex you are currently taking for possible urinary tract infection follow-up promptly with your urologist for further evaluation

## 2022-12-26 NOTE — ED PROVIDER NOTES
History  Chief Complaint   Patient presents with   • Flank Pain     Pt c/o left flank pain radiates to abdomen w/fatigue and weakness since having new stent placed 6 days ago  Patient is a 82-year-old female presents to the emergency department complaining of suprapubic and left abdominal discomfort and occasional flank discomfort on the left ongoing intermittently since recent lithotripsy and ureteral stent placement in which the patient was found to have a retained piece of a stent which is scheduled to be retrieved upcoming  Patient reports some increased nausea and generalized fatigue and weakness and not feeling well over the past 2 days  No fevers no vomiting  Patient is taking Pyridium oxybutynin and antibiotic since ureteral stent  Patient has not needed hydrocodone for pain took that once on Thursday and made her loopy  History provided by:  Patient and spouse  Fatigue  Severity:  Moderate  Onset quality:  Gradual  Duration:  2 days  Timing:  Constant  Progression:  Worsening  Chronicity:  New  Associated symptoms: abdominal pain and nausea    Associated symptoms: no arthralgias, no chest pain, no cough, no diarrhea, no dizziness, no dysuria, no fever, no frequency, no headaches, no myalgias, no shortness of breath, no urgency and no vomiting        Prior to Admission Medications   Prescriptions Last Dose Informant Patient Reported? Taking?    Ascorbic Acid (Vitamin C) 500 MG CAPS   Yes No   Sig: Take 1 capsule by mouth in the morning   Cholecalciferol 50 MCG (2000 UT) CAPS   Yes No   Sig: Take 1 capsule by mouth daily    Diclofenac Sodium (VOLTAREN) 1 %   Yes No   Sig: diclofenac 1 % topical gel   APPLY 2 GRAMS TO THE AFFECTED AREA(S) BY TOPICAL ROUTE 4 TIMES PER DAY   HYDROcodone-acetaminophen (NORCO) 5-325 mg per tablet   No No   Sig: Take 1 tablet by mouth every 4 (four) hours as needed for pain for up to 10 days Max Daily Amount: 6 tablets   Magnesium 100 MG CAPS   Yes No   Sig: Take by mouth daily    Patient not taking: Reported on 2022   Multiple Vitamins-Minerals (Multivitamin Gummies Womens) CHEW   Yes No   Sig: Chew 2 each daily   acetaminophen (Tylenol) 325 mg tablet   No No   Sig: Take 2 tablets (650 mg total) by mouth every 6 (six) hours as needed for mild pain   aspirin (ECOTRIN LOW STRENGTH) 81 mg EC tablet   Yes No   Sig: Take 81 mg by mouth daily   Patient not taking: Reported on 2022   cephalexin (KEFLEX) 500 mg capsule   No No   Sig: Take 1 capsule (500 mg total) by mouth every 6 (six) hours for 7 days   cyanocobalamin (VITAMIN B-12) 1000 MCG tablet   Yes No   Sig: Take 1,000 mcg by mouth daily   ezetimibe (ZETIA) 10 mg tablet   Yes No   Sig: Take 10 mg by mouth daily    fluticasone (FLONASE) 50 mcg/act nasal spray   Yes No   Si spray into each nostril as needed    leflunomide (ARAVA) 20 MG tablet   Yes No   Sig: Take 20 mg by mouth daily   montelukast (SINGULAIR) 10 mg tablet   Yes No   Sig: Take 10 mg by mouth daily   oxybutynin (DITROPAN) 5 mg tablet   No No   Sig: Take 1 tablet (5 mg total) by mouth 3 (three) times a day as needed (bladder spasm)   pantoprazole (PROTONIX) 40 mg tablet   Yes No   Sig: Take 40 mg by mouth daily   phenazopyridine (PYRIDIUM) 200 mg tablet   No No   Sig: Take 1 tablet (200 mg total) by mouth every 8 (eight) hours as needed for bladder spasms      Facility-Administered Medications: None       Past Medical History:   Diagnosis Date   • [facility] )     Pt had in left breast- had radiation and surgery   • COVID-19     pt had 2 times- ; 2022   • CPAP (continuous positive airway pressure) dependence     Pt uses nightly   • GERD (gastroesophageal reflux disease)    • H/O cervical fracture    • Hyperlipidemia    • Irregular heart beat     Pt is taking Zetia   Pt does not have happen anymore per pt since on medication   • Rheumatoid arthritis ([facility] [address] )    • Seasonal allergies    • Sleep apnea    • Wears glasses        Past Surgical History:   Procedure Laterality Date   • BLADDER SUSPENSION     • BREAST LUMPECTOMY     •  SECTION     • COLONOSCOPY     • EGD     • FL RETROGRADE PYELOGRAM  2021   • FOOT SURGERY Right    • HYSTERECTOMY     • NJ CYSTO BLADDER W/URETERAL CATHETERIZATION Left 2021    Procedure: CYSTOSCOPY RETROGRADE PYELOGRAM WITH INSERTION STENT URETERAL;  Surgeon: Kimberley Finley MD;  Location: [facility];  Service: Urology   • NJ CYSTO/URETERO W/LITHOTRIPSY &INDWELL STENT INSRT Left 2021    Procedure: CYSTOSCOPY URETEROSCOPY WITH LITHOTRIPSY HOLMIUM LASER,  AND INSERTION STENT URETERAL;  Surgeon: Joel Weiner MD;  Location: [facility];  Service: Urology   • NJ CYSTO/URETERO W/LITHOTRIPSY &INDWELL STENT INSRT Left 2022    Procedure: CYSTOSCOPY URETEROSCOPY WITH LITHOTRIPSY HOLMIUM LASER,  INSERTION STENT URETERAL, attempted removal of foreign body;  Surgeon: Joel Weiner MD;  Location: [facility];  Service: Urology   • SINUS SURGERY      Deviated septum       Family History   Problem Relation Age of Onset   • Lung cancer Father    • Heart disease Maternal Aunt    • Heart disease Maternal Uncle    • Kidney cancer Maternal Grandfather    • Lung cancer Maternal Grandfather    • Heart attack Brother      I have reviewed and agree with the history as documented  E-Cigarette/Vaping   • E-Cigarette Use Never User      E-Cigarette/Vaping Substances     Social History     Tobacco Use   • Smoking status: Former     Types: Cigarettes     Quit date:      Years since quittin 9   • Smokeless tobacco: Never   Vaping Use   • Vaping Use: Never used   Substance Use Topics   • Alcohol use: Yes     Comment: rarely   • Drug use: Never       Review of Systems   Constitutional: Positive for fatigue  Negative for activity change, appetite change, chills and fever  HENT: Negative for congestion, ear pain, rhinorrhea and sore throat  Eyes: Negative for discharge, redness and visual disturbance  Respiratory: Negative for cough, chest tightness, shortness of breath and wheezing  Cardiovascular: Negative for chest pain and palpitations  Gastrointestinal: Positive for abdominal pain and nausea  Negative for constipation, diarrhea and vomiting  Endocrine: Negative for polydipsia and polyuria  Genitourinary: Positive for flank pain  Negative for difficulty urinating, dysuria, frequency, hematuria and urgency  Musculoskeletal: Negative for arthralgias and myalgias  Skin: Negative for color change, pallor and rash  Neurological: Positive for weakness  Negative for dizziness, light-headedness, numbness and headaches  Hematological: Negative for adenopathy  Does not bruise/bleed easily  All other systems reviewed and are negative  Physical Exam  Physical Exam  Vitals and nursing note reviewed  Constitutional:       Appearance: She is well-developed  HENT:      Head: Normocephalic and atraumatic  Right Ear: External ear normal       Left Ear: External ear normal       Nose: Nose normal    Eyes:      Conjunctiva/sclera: Conjunctivae normal       Pupils: Pupils are equal, round, and reactive to light  Cardiovascular:      Rate and Rhythm: Normal rate and regular rhythm  Heart sounds: Normal heart sounds  Pulmonary:      Effort: Pulmonary effort is normal  No respiratory distress  Breath sounds: Normal breath sounds  No wheezing or rales  Chest:      Chest wall: No tenderness  Abdominal:      General: Bowel sounds are normal  There is no distension  Palpations: Abdomen is soft  Tenderness: There is abdominal tenderness in the suprapubic area and left lower quadrant  There is left CVA tenderness  There is no guarding  Musculoskeletal:         General: Normal range of motion  Cervical back: Normal range of motion and neck supple  Skin:     General: Skin is warm and dry     Neurological:      Mental Status: She is alert and oriented to person, place, and time  Cranial Nerves: No cranial nerve deficit  Sensory: No sensory deficit  Vital Signs  ED Triage Vitals [12/26/22 1245]   Temperature Pulse Respirations Blood Pressure SpO2   98 °F (36 7 °C) 86 18 163/94 97 %      Temp Source Heart Rate Source Patient Position - Orthostatic VS BP Location FiO2 (%)   Oral Monitor Lying Right arm --      Pain Score       5           Vitals:    12/26/22 1245   BP: 163/94   Pulse: 86   Patient Position - Orthostatic VS: Lying         Visual Acuity      ED Medications  Medications   potassium chloride (K-DUR,KLOR-CON) CR tablet 20 mEq (has no administration in time range)   sodium chloride 0 9 % bolus 1,000 mL (1,000 mL Intravenous New Bag 12/26/22 1258)   ondansetron (ZOFRAN) injection 4 mg (4 mg Intravenous Given 12/26/22 1258)       Diagnostic Studies  Results Reviewed     Procedure Component Value Units Date/Time    Urine Microscopic [[id number]]  (Abnormal) Collected: 12/26/22 1334    Lab Status: Final result Specimen: Urine, Clean Catch Updated: 12/26/22 1353     RBC, UA 10-20 /hpf      WBC, UA Innumerable /hpf      Epithelial Cells Occasional /hpf      Bacteria, UA Moderate /hpf     Urine culture [[id number]] Collected: 12/26/22 1334    Lab Status:  In process Specimen: Urine, Clean Catch Updated: 12/26/22 1353    Manual Differential(PHLEBS Do Not Order) [[id number]]  (Abnormal) Collected: 12/26/22 1257    Lab Status: Final result Specimen: Blood from Arm, Right Updated: 12/26/22 1348     Segmented % 61 %      Lymphocytes % 15 %      Monocytes % 9 %      Eosinophils, % 14 %      Basophils % 0 %      Atypical Lymphocytes % 1 %      Absolute Neutrophils 4 91 Thousand/uL      Lymphocytes Absolute 1 21 Thousand/uL      Monocytes Absolute 0 72 Thousand/uL      Eosinophils Absolute 1 13 Thousand/uL      Basophils Absolute 0 00 Thousand/uL      Total Counted --     Platelet Estimate Adequate    UA w Reflex to Microscopic w Reflex to Culture [[id number]]  (Abnormal) Collected: 12/26/22 1334    Lab Status: Final result Specimen: Urine, Clean Catch Updated: 12/26/22 1341     Color, UA Orange     Clarity, UA Slightly Cloudy     Specific Gravity, UA 1 025     pH, UA 6 0     Leukocytes, UA Large     Nitrite, UA Positive     Protein,  (2+) mg/dl      Glucose, UA Negative mg/dl      Ketones, UA Negative mg/dl      Urobilinogen, UA 1 0 E U /dl      Bilirubin, UA Small     Occult Blood, UA Moderate    Comprehensive metabolic panel [[id number]]  (Abnormal) Collected: 12/26/22 1257    Lab Status: Final result Specimen: Blood from Arm, Right Updated: 12/26/22 1336     Sodium 140 mmol/L      Potassium 3 3 mmol/L      Chloride 103 mmol/L      CO2 26 mmol/L      ANION GAP 11 mmol/L      BUN 19 mg/dL      Creatinine 1 28 mg/dL      Glucose 115 mg/dL      Calcium 9 3 mg/dL      Corrected Calcium 10 3 mg/dL      AST 43 U/L       U/L      Alkaline Phosphatase 278 U/L      Total Protein 7 5 g/dL      Albumin 2 8 g/dL      Total Bilirubin 0 51 mg/dL      eGFR 45 ml/min/1 73sq m     Narrative:      [place] guidelines for Chronic Kidney Disease (CKD):   •  Stage 1 with normal or high GFR (GFR > 90 mL/min/1 73 square meters)  •  Stage 2 Mild CKD (GFR = 60-89 mL/min/1 73 square meters)  •  Stage 3A Moderate CKD (GFR = 45-59 mL/min/1 73 square meters)  •  Stage 3B Moderate CKD (GFR = 30-44 mL/min/1 73 square meters)  •  Stage 4 Severe CKD (GFR = 15-29 mL/min/1 73 square meters)  •  Stage 5 End Stage CKD (GFR <15 mL/min/1 73 square meters)  Note: GFR calculation is accurate only with a steady state creatinine    Lipase [[id number]]  (Normal) Collected: 12/26/22 1257    Lab Status: Final result Specimen: Blood from Arm, Right Updated: 12/26/22 1336     Lipase 125 u/L     Magnesium [[id number]]  (Normal) Collected: 12/26/22 1257    Lab Status: Final result Specimen: Blood from Arm, Right Updated: 12/26/22 1336     Magnesium 2 0 mg/dL     Lactic acid [[id number]] (Normal) Collected: 12/26/22 1257    Lab Status: Final result Specimen: Blood from Arm, Right Updated: 12/26/22 1325     LACTIC ACID 0 8 mmol/L     Narrative:      Result may be elevated if tourniquet was used during collection  CBC and differential [[id number]]  (Abnormal) Collected: 12/26/22 1257    Lab Status: Final result Specimen: Blood from Arm, Right Updated: 12/26/22 1308     WBC 8 05 Thousand/uL      RBC 4 10 Million/uL      Hemoglobin 11 4 g/dL      Hematocrit 35 7 %      MCV 87 fL      MCH 27 8 pg      MCHC 31 9 g/dL      RDW 13 0 %      MPV 9 8 fL      Platelets 299 Thousands/uL     Narrative: This is an appended report  These results have been appended to a previously verified report  CT abdomen pelvis wo contrast   Final Result by Norma Dejesus MD (12/26 3807)      Bilateral nonobstructing renal calculi  There is a left-sided ureteral stent in place, and no collecting system dilatation  Workstation performed: [id number]                    Procedures  Procedures         ED Course  ED Course as of 12/26/22 1404   Mon Dec 26, 2022   1345 Nitrite, UA(!): Positive  Noted no fever or leukocytosis likely secondary to patient currently taking Pyridium  Patient is currently on antibiotics status post recent ureteroscopy and stent                                               MDM  Number of Diagnoses or Management Options  Elevated ALT measurement: new and requires workup  Hypokalemia: new and requires workup  UTI (urinary tract infection): new and requires workup  Diagnosis management comments: She remained clinically and hemodynamically stable in the emergency department she is afebrile nontoxic well-appearing felt improved with rest fluids and nausea medicine given in the ED  work-up in the ED reveals up replacement and functioning of the left ureteral stent at this time with no signs of severe sepsis or septic shock on work-up in the ED there is mild hypokalemia and mild elevation in the ALT noted in the ED no other evidence of acute intra-abdominal pathology  Urine possibly positive for urinary tract infection versus positive due to Pyridium patient currently on antibiotics no leukocytosis advised that she continue these antibiotics recommended rest plenty fluids and supportive care and prompt follow-up with primary physician and urology for further evaluation and treatment and obtain test results return precautions and anticipatory guidance discussed           Amount and/or Complexity of Data Reviewed  Clinical lab tests: reviewed and ordered  Tests in the radiology section of CPT®: ordered and reviewed  Tests in the medicine section of CPT®: ordered and reviewed  Decide to obtain previous medical records or to obtain history from someone other than the patient: yes  Review and summarize past medical records: yes  Independent visualization of images, tracings, or specimens: yes    Risk of Complications, Morbidity, and/or Mortality  Presenting problems: moderate  Diagnostic procedures: moderate  Management options: moderate        Disposition  Final diagnoses:   UTI (urinary tract infection) - Possible currently on antibiotics   Hypokalemia   Elevated ALT measurement - Mild     Time reflects when diagnosis was documented in both MDM as applicable and the Disposition within this note     Time User Action Codes Description Comment    12/26/2022  2:00 PM Don Shelby Add [N39 0] UTI (urinary tract infection)     12/26/2022  2:00 PM Don Shelby Add [E87 6] Hypokalemia     12/26/2022  2:00 PM Don Shelby Modify [N39 0] UTI (urinary tract infection) Possible currently on antibiotics    12/26/2022  2:01 PM Robert  Add [R74 01] Elevated ALT measurement     12/26/2022  2:01 PM Robert Guallpa Modify [R74 01] Elevated ALT measurement Mild      ED Disposition     ED Disposition   Discharge    Condition   Stable    Date/Time   Mon Dec 26, 2022  2:00 PM    Comment   Abdoulaye Capellan Lazaro discharge to home/self care  Follow-up Information     Follow up With Specialties Details Why Contact Info    Justice aBrroso MD [facility] Schedule an appointment as soon as possible for a visit in 2 days  [address]      Elias Galindo MD Urology Schedule an appointment as soon as possible for a visit in 2 days  [address] [place] [address] [address]  [phone]            Patient's Medications   Discharge Prescriptions    ONDANSETRON (ZOFRAN-ODT) 4 MG DISINTEGRATING TABLET    Take 1 tablet (4 mg total) by mouth every 6 (six) hours as needed for nausea or vomiting       Start Date: 12/26/2022End Date: --       Order Dose: 4 mg       Quantity: 12 tablet    Refills: 0       No discharge procedures on file      PDMP Review       Value Time User    PDMP Reviewed  Yes 2/21/2022 10:53 AM Damien eMade MD          ED Provider  Electronically Signed by           Renetta Tineo DO  12/26/22 3750

## 2022-12-27 LAB — BACTERIA UR CULT: NORMAL

## 2022-12-28 ENCOUNTER — NURSE TRIAGE (OUTPATIENT)
Dept: OTHER | Facility: OTHER | Age: 62
End: 2022-12-28

## 2022-12-28 NOTE — TELEPHONE ENCOUNTER
Returned call to patient   Reports that she feels the stent when sitting and when she urinated  3-4/10 when urinating  There is a lot of pressure  Urinary stream is good  No nausea, vomiting , fever or chills  Patient states that she feels like she did feel like this before when the stent dislodged previously  She does can not visualize the stent     Advised patient to monitor for changes  Ed precautions reviewed  Patient  verbalized understanding  Patient also is requesting when her next date for surgery is   Has not been contact by anyone yet  Advised that she should be contacted by our practice within 7-10 days with her next procedure date

## 2022-12-28 NOTE — TELEPHONE ENCOUNTER
Patient called in to report increasing pressure in groin area and feels like the stent is going to come out; previously had stent expelled two times  Patient seen in ED on 12/26 for UTI and flank pain  Patient knows provider wants to keep stent in until upcoming procedure that has not been scheduled yet  Please follow up with patient     Reason for Disposition  • Caller has URGENT question and triager unable to answer question    Answer Assessment - Initial Assessment Questions  1  SYMPTOM: "What's the main symptom you're concerned about?" (e g , pain, fever, vomiting)      Feels like stent is coming out; pressure in groin and has stent come out on its own twice before  2  ONSET: "When did symptoms start?"      12/28 pressure increasing today   3  SURGERY: "What surgery was performed?"      CYSTOSCOPY URETEROSCOPY WITH LITHOTRIPSY HOLMIUM LASER,  INSERTION STENT URETERAL, attempted removal of foreign body (Left: Ureter)  4  DATE of SURGERY: "When was surgery performed?"     12/21/22  5  ANESTHESIA: " What type of anesthesia did you have?" (e g , general, spinal, epidural, local)      General  6  PAIN: "Is there any pain?" If Yes, ask: "How bad is it?"  (Scale 1-10; or mild, moderate, severe)      Denies; discomfort  7  FEVER: "Do you have a fever?" If Yes, ask: "What is your temperature, how was it measured, and when did it start?"      Denies  8  VOMITING: "Is there any vomiting?" If yes, ask: "How many times?"      Denies  9  BLEEDING: "Is there any bleeding?" If Yes, ask: "How much?" and "Where?"      Denies  10   OTHER SYMPTOMS: "Do you have any other symptoms?" (e g , drainage from wound, painful urination, constipation)        Denies    Protocols used: POST-OP SYMPTOMS AND QUESTIONS-ADULT-[place]

## 2022-12-28 NOTE — TELEPHONE ENCOUNTER
Regarding: stent/ pressure in groin area  ----- Message from Leeann Heck sent at 12/28/2022  3:31 PM EST -----  " I know that my doctor wanted me to keep my stent in but I'm feeling a lot of pressure in my groin area and I think that it maybe trying to come out "

## 2022-12-29 LAB
CA CARBONATE CRY STONE QL IR: 80 %
COLOR STONE: NORMAL
COMMENT-STONE3: NORMAL
COMPOSITION: NORMAL
LABORATORY COMMENT REPORT: NORMAL
PHOTO: NORMAL
SIZE STONE: NORMAL MM
SPEC SOURCE SUBJ: NORMAL
STONE ANALYSIS-IMP: NORMAL
STONE ANALYSIS-IMP: NORMAL
TRI-PHOS MFR STONE: 20 %
WT STONE: 11 MG

## 2023-01-06 NOTE — PATIENT COMMUNICATION
I have a cysto stent removal apt on hold for pt on 1/10/23 at 11:30 am at [place]  Waiting to talk to Jenniffer Pascal about apt before I call pt  Telephone call to pt   Scheduled for stent removal

## 2023-01-10 ENCOUNTER — OFFICE VISIT (OUTPATIENT)
Dept: UROLOGY | Facility: CLINIC | Age: 63
End: 2023-01-10

## 2023-01-10 VITALS
SYSTOLIC BLOOD PRESSURE: 132 MMHG | DIASTOLIC BLOOD PRESSURE: 82 MMHG | HEART RATE: 80 BPM | BODY MASS INDEX: 34.15 KG/M2 | WEIGHT: 200 LBS | HEIGHT: 64 IN

## 2023-01-10 DIAGNOSIS — N20.1 URETERAL CALCULUS: ICD-10-CM

## 2023-01-10 DIAGNOSIS — Z96.0 RETAINED URETERAL STENT: Primary | ICD-10-CM

## 2023-01-10 DIAGNOSIS — N20.0 STAGHORN CALCULUS: ICD-10-CM

## 2023-01-10 LAB
SL AMB  POCT GLUCOSE, UA: NORMAL
SL AMB LEUKOCYTE ESTERASE,UA: NORMAL
SL AMB POCT BILIRUBIN,UA: NORMAL
SL AMB POCT BLOOD,UA: NORMAL
SL AMB POCT CLARITY,UA: CLEAR
SL AMB POCT COLOR,UA: YELLOW
SL AMB POCT KETONES,UA: NORMAL
SL AMB POCT NITRITE,UA: NORMAL
SL AMB POCT PH,UA: 6.5
SL AMB POCT SPECIFIC GRAVITY,UA: 1.01
SL AMB POCT URINE PROTEIN: NORMAL
SL AMB POCT UROBILINOGEN: NORMAL

## 2023-01-10 RX ORDER — CEPHALEXIN 500 MG/1
500 CAPSULE ORAL EVERY 6 HOURS SCHEDULED
Qty: 12 CAPSULE | Refills: 0 | Status: SHIPPED | OUTPATIENT
Start: 2023-01-10 | End: 2023-01-13

## 2023-01-10 NOTE — H&P (VIEW-ONLY)
1/10/2023  Lacy Cage is a 63 y o  female  [id number]        Diagnosis      Patient is s/p left ureteroscopy with stone extraction on 12/21/2022 with Dr Erick Hinojosa  Plan    1  Renal Calculi   · Left ureteral stent removed via cystoscopy due to discomfort and renal colic without complication today  · Patient is scheduled for Left PCNL with Dr Erick Hinojosa on 2/02/2023 at [place] [address]  She is scheduled to see Interventional Radiology on 1/30/2023 for Nephrostomy tube placement  Technique, benefits, and risk of the procedure listed above were discussed with them today and informed written consent was obtained  All questions and concerns regarding surgery and perioperative expectations have been addressed and answered  Procedure Cystoscopy ureteral stent removal       Cystoscopy     Date/Time 1/10/2023 11:30 AM     Performed by  EDWARD Bergeron     Authorized by EDWARD Bergeron      Universal Protocol:  Consent: Written consent obtained  Risks and benefits: risks, benefits and alternatives were discussed  Consent given by: patient  Timeout called at: 1/10/2023 11:30 AM   Patient understanding: patient states understanding of the procedure being performed  Patient consent: the patient's understanding of the procedure matches consent given  Procedure consent: procedure consent matches procedure scheduled  Test results: test results available and properly labeled  Patient identity confirmed: verbally with patient        Procedure Details:  Procedure type: unilateral ureteral stent    Patient tolerance: Patient tolerated the procedure well with no immediate complications    Additional Procedure Details: The patient returns to the office today to undergo cystoscopy with left stent removal  Risk and benefits of the procedure were discussed and informed consent was obtained  The patient was placed in the modified supine position  The genitalia were prepped and draped in a sterile fashion  Viscous lidocaine was used for local anesthesia  The flexible cystoscope was passed  The bladder was inspected  The stent was identified coming from the left ureteral orifice  The stent was grasped with a flexible grasper and was then removed in its entirety without complications  Overall the patient tolerated the procedure  The patient was provided with a 3 day prescription of Keflex for infection prophylaxis following the procedure  They were made aware to advise our office of any fevers greater than 101 degrees Fahrenheit, malaise, or chills  They were advised that it is normal to have cramping pain on the ipsilateral side for a day or so after ureteral stent removal   They are to remain well hydrated in the coming days  Vitals:    01/10/23 1110   BP: 132/82   Pulse: 80   Weight: 90 7 kg (200 lb)   Height: 5' 4" (1 626 m)       Stent removed intact without difficulty  Reviewed post stent removal symptoms including flank pain, dysuria, and hematuria  Instructed patient to increase oral fluid intake  Encouraged the use of NSAIDS and other prescribed pain medication as needed for discomfort  Patient instructed to call the office or report to the ER for uncontrolled pain, fever, chills, nausea or vomiting         Sarah Ugalde

## 2023-01-12 LAB — BACTERIA UR CULT: ABNORMAL

## 2023-01-16 ENCOUNTER — TELEPHONE (OUTPATIENT)
Dept: UROLOGY | Facility: MEDICAL CENTER | Age: 63
End: 2023-01-16

## 2023-01-19 ENCOUNTER — APPOINTMENT (OUTPATIENT)
Dept: LAB | Facility: CLINIC | Age: 63
End: 2023-01-19

## 2023-01-19 DIAGNOSIS — Z96.0 BLADDER REPLACED BY OTHER MEANS: ICD-10-CM

## 2023-01-19 DIAGNOSIS — Z01.812 PRE-OPERATIVE LABORATORY EXAMINATION: ICD-10-CM

## 2023-01-19 DIAGNOSIS — N20.0 RENAL CALCULUS, LEFT: ICD-10-CM

## 2023-01-19 DIAGNOSIS — Z96.0 RETAINED URETERAL STENT: ICD-10-CM

## 2023-01-19 DIAGNOSIS — N20.0 URIC ACID NEPHROLITHIASIS: ICD-10-CM

## 2023-01-20 ENCOUNTER — LAB REQUISITION (OUTPATIENT)
Dept: LAB | Facility: HOSPITAL | Age: 63
End: 2023-01-20

## 2023-01-20 DIAGNOSIS — N20.0 CALCULUS OF KIDNEY: ICD-10-CM

## 2023-01-20 DIAGNOSIS — Z01.812 ENCOUNTER FOR PREPROCEDURAL LABORATORY EXAMINATION: ICD-10-CM

## 2023-01-20 DIAGNOSIS — Z96.0 PRESENCE OF UROGENITAL IMPLANTS: ICD-10-CM

## 2023-01-20 LAB
ABO GROUP BLD: NORMAL
BACTERIA UR CULT: ABNORMAL
BACTERIA UR CULT: ABNORMAL
BLD GP AB SCN SERPL QL: NEGATIVE
RH BLD: POSITIVE
SPECIMEN EXPIRATION DATE: NORMAL

## 2023-01-30 ENCOUNTER — ANESTHESIA (OUTPATIENT)
Dept: RADIOLOGY | Facility: HOSPITAL | Age: 63
End: 2023-01-30

## 2023-01-30 ENCOUNTER — TELEPHONE (OUTPATIENT)
Dept: UROLOGY | Facility: CLINIC | Age: 63
End: 2023-01-30

## 2023-01-30 ENCOUNTER — HOSPITAL ENCOUNTER (OUTPATIENT)
Dept: RADIOLOGY | Facility: HOSPITAL | Age: 63
Discharge: HOME | End: 2023-01-30
Attending: RADIOLOGY

## 2023-01-30 ENCOUNTER — ANESTHESIA EVENT (OUTPATIENT)
Dept: RADIOLOGY | Facility: HOSPITAL | Age: 63
End: 2023-01-30

## 2023-01-30 ENCOUNTER — TELEPHONE (OUTPATIENT)
Dept: UROLOGY | Facility: MEDICAL CENTER | Age: 63
End: 2023-01-30

## 2023-01-30 VITALS
DIASTOLIC BLOOD PRESSURE: 74 MMHG | HEIGHT: 64 IN | OXYGEN SATURATION: 96 % | RESPIRATION RATE: 16 BRPM | BODY MASS INDEX: 34.02 KG/M2 | HEART RATE: 88 BPM | TEMPERATURE: 98 F | SYSTOLIC BLOOD PRESSURE: 126 MMHG | WEIGHT: 199.3 LBS

## 2023-01-30 DIAGNOSIS — N13.30 HYDRONEPHROSIS OF LEFT KIDNEY: Primary | ICD-10-CM

## 2023-01-30 DIAGNOSIS — N20.0 NEPHROLITHIASIS: ICD-10-CM

## 2023-01-30 LAB
ERYTHROCYTE [DISTWIDTH] IN BLOOD BY AUTOMATED COUNT: 13.3 % (ref 11.6–15.1)
HCT VFR BLD AUTO: 36.8 % (ref 34.8–46.1)
HGB BLD-MCNC: 11.9 G/DL (ref 11.5–15.4)
INR PPP: 0.93 (ref 0.84–1.19)
MCH RBC QN AUTO: 28.5 PG (ref 26.8–34.3)
MCHC RBC AUTO-ENTMCNC: 32.3 G/DL (ref 31.4–37.4)
MCV RBC AUTO: 88 FL (ref 82–98)
PLATELET # BLD AUTO: 248 THOUSANDS/UL (ref 149–390)
PMV BLD AUTO: 9.3 FL (ref 8.9–12.7)
PROTHROMBIN TIME: 12.4 SECONDS (ref 11.6–14.5)
RBC # BLD AUTO: 4.17 MILLION/UL (ref 3.81–5.12)
WBC # BLD AUTO: 10.74 THOUSAND/UL (ref 4.31–10.16)

## 2023-01-30 RX ORDER — SODIUM CHLORIDE 9 MG/ML
75 INJECTION, SOLUTION INTRAVENOUS CONTINUOUS
Status: DISCONTINUED | OUTPATIENT
Start: 2023-01-30 | End: 2023-01-31 | Stop reason: HOSPADM

## 2023-01-30 RX ORDER — LIDOCAINE HYDROCHLORIDE 20 MG/ML
INJECTION, SOLUTION EPIDURAL; INFILTRATION; INTRACAUDAL; PERINEURAL AS NEEDED
Status: DISCONTINUED | OUTPATIENT
Start: 2023-01-30 | End: 2023-01-30

## 2023-01-30 RX ORDER — LIDOCAINE HYDROCHLORIDE 10 MG/ML
INJECTION, SOLUTION EPIDURAL; INFILTRATION; INTRACAUDAL; PERINEURAL AS NEEDED
Status: COMPLETED | OUTPATIENT
Start: 2023-01-30 | End: 2023-01-30

## 2023-01-30 RX ORDER — OXYCODONE HYDROCHLORIDE 5 MG/1
5 TABLET ORAL EVERY 4 HOURS PRN
Status: DISCONTINUED | OUTPATIENT
Start: 2023-01-30 | End: 2023-01-31 | Stop reason: HOSPADM

## 2023-01-30 RX ORDER — ROCURONIUM BROMIDE 10 MG/ML
INJECTION, SOLUTION INTRAVENOUS AS NEEDED
Status: DISCONTINUED | OUTPATIENT
Start: 2023-01-30 | End: 2023-01-30

## 2023-01-30 RX ORDER — DEXAMETHASONE SODIUM PHOSPHATE 10 MG/ML
INJECTION, SOLUTION INTRAMUSCULAR; INTRAVENOUS AS NEEDED
Status: DISCONTINUED | OUTPATIENT
Start: 2023-01-30 | End: 2023-01-30

## 2023-01-30 RX ORDER — MIDAZOLAM HYDROCHLORIDE 2 MG/2ML
INJECTION, SOLUTION INTRAMUSCULAR; INTRAVENOUS AS NEEDED
Status: DISCONTINUED | OUTPATIENT
Start: 2023-01-30 | End: 2023-01-30

## 2023-01-30 RX ORDER — ONDANSETRON 2 MG/ML
INJECTION INTRAMUSCULAR; INTRAVENOUS AS NEEDED
Status: DISCONTINUED | OUTPATIENT
Start: 2023-01-30 | End: 2023-01-30

## 2023-01-30 RX ORDER — ACETAMINOPHEN 325 MG/1
650 TABLET ORAL EVERY 4 HOURS PRN
Status: DISCONTINUED | OUTPATIENT
Start: 2023-01-30 | End: 2023-01-31 | Stop reason: HOSPADM

## 2023-01-30 RX ORDER — ONDANSETRON 2 MG/ML
4 INJECTION INTRAMUSCULAR; INTRAVENOUS ONCE AS NEEDED
Status: DISCONTINUED | OUTPATIENT
Start: 2023-01-30 | End: 2023-01-30 | Stop reason: HOSPADM

## 2023-01-30 RX ORDER — FENTANYL CITRATE/PF 50 MCG/ML
25 SYRINGE (ML) INJECTION
Status: DISCONTINUED | OUTPATIENT
Start: 2023-01-30 | End: 2023-01-30 | Stop reason: HOSPADM

## 2023-01-30 RX ORDER — LEVOFLOXACIN 5 MG/ML
500 INJECTION, SOLUTION INTRAVENOUS ONCE
Status: COMPLETED | OUTPATIENT
Start: 2023-01-30 | End: 2023-01-30

## 2023-01-30 RX ORDER — PROPOFOL 10 MG/ML
INJECTION, EMULSION INTRAVENOUS AS NEEDED
Status: DISCONTINUED | OUTPATIENT
Start: 2023-01-30 | End: 2023-01-30

## 2023-01-30 RX ORDER — SODIUM CHLORIDE 9 MG/ML
10 INJECTION INTRAVENOUS DAILY
Qty: 900 ML | Refills: 1 | Status: SHIPPED | OUTPATIENT
Start: 2023-01-30 | End: 2023-04-30

## 2023-01-30 RX ORDER — FENTANYL CITRATE 50 UG/ML
INJECTION, SOLUTION INTRAMUSCULAR; INTRAVENOUS AS NEEDED
Status: DISCONTINUED | OUTPATIENT
Start: 2023-01-30 | End: 2023-01-30

## 2023-01-30 RX ADMIN — IOHEXOL 100 ML: 350 INJECTION, SOLUTION INTRAVENOUS at 13:49

## 2023-01-30 RX ADMIN — SODIUM CHLORIDE 75 ML/HR: 0.9 INJECTION, SOLUTION INTRAVENOUS at 09:17

## 2023-01-30 RX ADMIN — ONDANSETRON 4 MG: 2 INJECTION INTRAMUSCULAR; INTRAVENOUS at 13:09

## 2023-01-30 RX ADMIN — DEXAMETHASONE SODIUM PHOSPHATE 8 MG: 10 INJECTION INTRAMUSCULAR; INTRAVENOUS at 11:27

## 2023-01-30 RX ADMIN — SUGAMMADEX 200 MG: 100 INJECTION, SOLUTION INTRAVENOUS at 13:30

## 2023-01-30 RX ADMIN — OXYCODONE HYDROCHLORIDE 5 MG: 5 TABLET ORAL at 15:05

## 2023-01-30 RX ADMIN — LEVOFLOXACIN 500 MG: 5 INJECTION, SOLUTION INTRAVENOUS at 09:23

## 2023-01-30 RX ADMIN — LIDOCAINE HYDROCHLORIDE 10 ML: 10 INJECTION, SOLUTION EPIDURAL; INFILTRATION; INTRACAUDAL; PERINEURAL at 11:41

## 2023-01-30 RX ADMIN — LEVOFLOXACIN: 5 INJECTION, SOLUTION INTRAVENOUS at 10:57

## 2023-01-30 RX ADMIN — MIDAZOLAM 2 MG: 1 INJECTION INTRAMUSCULAR; INTRAVENOUS at 11:07

## 2023-01-30 RX ADMIN — SODIUM CHLORIDE: 0.9 INJECTION, SOLUTION INTRAVENOUS at 11:50

## 2023-01-30 RX ADMIN — IOHEXOL 50 ML: 350 INJECTION, SOLUTION INTRAVENOUS at 11:56

## 2023-01-30 RX ADMIN — FENTANYL CITRATE 50 MCG: 50 INJECTION INTRAMUSCULAR; INTRAVENOUS at 12:17

## 2023-01-30 RX ADMIN — FENTANYL CITRATE 50 MCG: 50 INJECTION INTRAMUSCULAR; INTRAVENOUS at 11:09

## 2023-01-30 RX ADMIN — LIDOCAINE HYDROCHLORIDE 100 MG: 20 INJECTION, SOLUTION EPIDURAL; INFILTRATION; INTRACAUDAL; PERINEURAL at 11:09

## 2023-01-30 RX ADMIN — FENTANYL CITRATE 50 MCG: 50 INJECTION INTRAMUSCULAR; INTRAVENOUS at 13:21

## 2023-01-30 RX ADMIN — PROPOFOL 200 MG: 10 INJECTION, EMULSION INTRAVENOUS at 11:09

## 2023-01-30 RX ADMIN — ROCURONIUM BROMIDE 40 MG: 10 INJECTION, SOLUTION INTRAVENOUS at 11:10

## 2023-01-30 NOTE — INTERVAL H&P NOTE
Update: (This section must be completed if the H&P was completed greater than 24 hrs to procedure or admission)    H&P reviewed  After examining the patient, I find no changed to the H&P since it had been written  History of left renal stone disease s/p stent     Prior interventions, has Left retained wire / stone, probably sheared wire from 2021    At this point there are plans for PCNL later in the week to remove this    We will obtain access into the kidney percutaneously, this is more difficult as the kidney has no hydronephrosis    We can also attempt to snare and remove retained object in the upper pole    Risks benefits alternatives discussed with the patient    Levofloxacin given based on prior culture data      Risks of bleeding discussed  Appreciate anesthesia support    Patient re-evaluated   Accept as history and physical     Moni Mccall MD/January 30, 2023/11:20 AM

## 2023-01-30 NOTE — DISCHARGE INSTRUCTIONS
Nephrostomy Tube Care     WHAT YOU NEED TO KNOW:   A nephrostomy tube is a catheter (thin plastic tube) that is inserted through your skin and into your kidney  The nephrostomy tube drains urine from your kidney into a collecting bag outside your body  You may need a nephrostomy tube when something is blocking the normal flow of urine  A nephrostomy tube may be used for a short or a long period of time  The nephrostomy tube comes out of your back, so you will need someone to help care for your nephrostomy tube  DISCHARGE INSTRUCTIONS:      How to clean the skin around the nephrostomy tube and change the bandage:  Since the nephrostomy tube comes out of your back, you will not be able to care for it by yourself  Ask someone to follow the general directions below to check and care for your nephrostomy tube  Gather the items you will need  Disposable (single use) under-pad, and a clean washcloth  Plain soap, warm water, and new medical gloves  Sterile gauze bandages  Clear adhesive dressing or medical tape  Skin barrier  Protective skin film  Trash bag  Remove the old bandage, and check the tube entry site  Have the patient lie on his side with the nephrostomy tube entry site facing up  Place the under-pad where it will catch drainage as you are working with the nephrostomy tube  Wash your hands with soap and water  Put on new medical gloves  Gently remove the old bandage, without pulling on the tube  Do this by holding the skin beside the tube with one hand  With the other hand, gently remove sticky tape and the skin barrier by pulling in the same direction as hair growth  Do not touch the side of the bandage that is placed over or around the tube  Throw the bandage and skin barrier away in a trash bag  Look for signs of infection, such as skin redness and swelling  Report any skin changes to healthcare providers  Clean the tube entry site      Hold the tube in place to keep it from being pulled out while you are cleaning around it  You will need to clean the area twice  For the first cleaning, wet a new gauze bandage with soap and water  Begin at the entry site of the tube  Wipe the skin in circles, moving away from the entry site  Remove blood and any other material with the gauze  Do this as often as needed  Use a new gauze bandage each time you clean the area, moving away from the entry site  For the second cleaning, wet a new gauze bandage with water  Begin at the entry site of the tube  Wipe the skin in circles, moving away from the entry site  Use a new gauze bandage each time you clean the area, moving away from the entry site  Gently pat the skin with a clean washcloth to dry it  Apply the skin barrier and bandages  Roll up a bandage to make it thick, and place it under  the place where the tube enters the skin  Place it to support the tube, and stop it from kinking or bending  Tape the bandage in place, and apply more bandages if directed by a healthcare provider  Bring the tubing forward to the front and tape it to the skin  Do not stretch the tube tight, because this may pull the nephrostomy tube out  How often to change the bandage  Change the bandage around the tube, every other day  If your bandages  get dirty or wet, change them right away, and as often as needed  If your nephrostomy tube is to be used for a long period of time, the tube needs to be changed every 2 to 3 months  Healthcare providers will tell you when you need to make an appointment to have your tube changed  How to care for the urine drainage bag:   Ask if you need to measure and write down how much urine is in the bag before you empty it  Drain urine out of the drainage bag when it is ½ to ? full  Open the spout at the bottom of the bag to empty the urine into the toilet  You may need to detach the drainage bag from the nephrostomy tube to change it    If so, attach a new drainage bag tightly to the nephrostomy tube  How to prevent problems with your nephrostomy tube:   Change bandages, directed  This helps to prevent infection  Throw away or clean your drainage bag as directed by your healthcare provider  Wipe the connecting ends of the drainage bag with alcohol before you reconnect the bag to the tube  This helps prevent infection  Keep the tube taped to your skin and connected to a drainage bag placed below the level of your kidneys  This helps prevent urine from backing up into your kidneys  You may wear a small drainage bag strapped to your leg to let you move around more easily  Check the catheter to be sure it is in place after you change your clothes or do other activities  Do not wear tight clothing over the tube  Place the tubing over your thigh rather than under it when you are sitting down  Be sure that nothing is pulling on the nephrostomy tube when you move around  Change positions if you see little or no urine in your drainage bag  Check to see if the urine tube is twisted or bent  Be sure that you are not sitting or lying on the tube  If there are no kinks and there is little or no urine in the drainage bag, tell your healthcare provider  Flush out the tube as directed  Some tubes get flushed one time a day with 10 mls of NSS You will be given a prescription for the flushes  To flush the nephrostomy tube, clean both connections with alcohol swap  Twist off the drainage bag tube and twist the saline syringe into the nephrostomy tube and flush briskly  Remove the syringe and twist the drainage bag tube back into the nephrostomy tube  Keep the site covered while you shower  Tape a piece of clear adhesive plastic over the dressing to keep it dry while you shower  Do not take tub baths      Contact Interventional Radiology at [phone] [place] PATIENTS: Contact Interventional Radiology at [phone]) [place] PATIENTS: Contact Interventional Radiology at [phone]) if:  The skin around the nephrostomy tube is red, swollen, itches, or has a rash  You have a fever greater than 101 or chills  You have lower back or hip pain  There are changes in how your urine looks or smells  You have little or no urine draining from the nephrostomy tube  You have nausea and are vomiting  The black lex on your tube has moved, or the tube is longer than when it was put in  You have questions or concerns about your condition or care  The nephrostomy tube comes out completely  There is blood, pus, or a bad smell coming from the place where the tube enters your skin  Urine is leaking around the tube  The following pharmacies carry the flush syringes  [facility]                     [facility]       [address]  Phone [phone]            Phone [phone]  [address] & [facility]                      [address]                                 [phone]  Phone [phone]            Phone [phone]    [facility]                                                                         [facility] [phone]  [address]   Phone [phone]

## 2023-01-30 NOTE — TELEPHONE ENCOUNTER
Contacted patient and scheduled her for an appointment tomorrow at 2:30 pm in the [place] office to cap nephrostomy tube and begin capping trial  If patient does well with capping trial, can remove nephrostomy tube on Thursday in the office per Dr Parth Musa

## 2023-01-30 NOTE — ANESTHESIA POSTPROCEDURE EVALUATION
Post-Op Assessment Note    CV Status:  Stable    Pain management: adequate     Mental Status:  Alert and awake   Hydration Status:  Euvolemic   PONV Controlled:  Controlled   Airway Patency:  Patent      Post Op Vitals Reviewed: Yes      Staff: Anesthesiologist         No notable events documented      BP      Temp      Pulse     Resp      SpO2      /79   Pulse 95   Temp 98 °F (36 7 °C) (Temporal)   Resp 16   Ht 5' 4" (1 626 m)   Wt 90 4 kg (199 lb 4 7 oz)   SpO2 95%   BMI 34 21 kg/m²

## 2023-01-30 NOTE — ANESTHESIA PREPROCEDURE EVALUATION
Procedure:  IR NEPHROURETERAL ACCESS FOR UROLOGY PCNL    Relevant Problems   CARDIO          GI/HEPATIC   (+) Gastroesophageal reflux disease without esophagitis      /RENAL   (+) Hydronephrosis of left kidney      GYN   (+) Malignant neoplasm of female breast (HCC)      MUSCULOSKELETAL   (+) Rheumatoid arthritis (HCC)      NEURO/PSYCH   (+) History of hyperlipidemia      PULMONARY   (+) Sleep apnea      2/21/22 ECHO   Left Ventricle Left ventricular cavity size is normal  Wall thickness is normal  The left ventricular ejection fraction is 70%  Systolic function is hyperdynamic  Wall motion is normal  Diastolic function is mildly abnormal, consistent with grade I (abnormal) relaxation  Right Ventricle Right ventricular cavity size is normal  Systolic function is normal    Left Atrium The atrium is normal in size  Right Atrium The atrium is normal in size  Aortic Valve The aortic valve is trileaflet  There is no evidence of regurgitation  There is no evidence of stenosis  Mitral Valve The mitral valve has normal structure  There is trace regurgitation  There is no evidence of stenosis  Tricuspid Valve Tricuspid valve structure is normal  There is no evidence of regurgitation  There is no evidence of stenosis  Pulmonic Valve The pulmonic valve was not well visualized  There is no evidence of regurgitation  There is no evidence of stenosis  Ascending Aorta The ascending aorta is normal in size  IVC/SVC The inferior vena cava is normal in size  Respirophasic changes were normal    Pericardium There is no pericardial effusion

## 2023-01-30 NOTE — TELEPHONE ENCOUNTER
The patient underwent successful extraction of the foreign body from the upper pole of her kidney today by [facility]  She has some stones in the upper pole but they should come out on their own  She has a nephrostomy tube in place she does not tolerate stents well  I called her and I told her I reviewed her films personally, and I think we can cap her nephrostomy tube in the office tomorrow and then if she does well with that remove the tube on Thursday  We will cancel her planned PCNL/fragment removal on Thursday  The office has been messaged to set it up for the capping of the nephrostomy tube tomorrow

## 2023-01-30 NOTE — TELEPHONE ENCOUNTER
----- Message from Jossie Bell MD sent at 1/30/2023  2:49 PM EST -----  She had her stent fragment removed in IR-has nephrostomy tube that is open to bag  Spoke with she and her   Please call her for an appointment to come in to have it capped in the office tomorrow, and if she does well with that we can removed in the office on Thursday  Stephani Georges, if you are not in the office Thursday let me know and I will work on other arrangements such as dArianne on Friday

## 2023-01-30 NOTE — ANESTHESIA PREPROCEDURE EVALUATION
Procedure:  IR NEPHROURETERAL ACCESS FOR UROLOGY PCNL    Relevant Problems              GI/HEPATIC   (+) Gastroesophageal reflux disease without esophagitis      /RENAL   (+) Hydronephrosis of left kidney                MUSCULOSKELETAL   (+) Rheumatoid arthritis (HCC)      NEURO/PSYCH   (+) History of hyperlipidemia      PULMONARY   (+) Sleep apnea        Physical Exam    Airway    Mallampati score: II  TM Distance: >3 FB  Neck ROM: full     Dental       Cardiovascular  Rhythm: regular, Rate: normal,     Pulmonary  Breath sounds clear to auscultation,     Other Findings        Anesthesia Plan  ASA Score- 2     Anesthesia Type- general with ASA Monitors  Additional Monitors:   Airway Plan: ETT  Plan Factors-    Chart reviewed  Existing labs reviewed  Induction- intravenous  Postoperative Plan- Plan for postoperative opioid use  Planned trial extubation    Informed Consent- Anesthetic plan and risks discussed with patient

## 2023-01-30 NOTE — BRIEF OP NOTE (RAD/CATH)
INTERVENTIONAL RADIOLOGY PROCEDURE NOTE    Date: 1/30/2023    Procedure: IR NEPHROSTOMY  Foreign body removal    Procedure Summary     Date: 01/30/23 Room / Location: [address] [place] [address] Interventional Radiology    Anesthesia Start: 1633 Anesthesia Stop:     Procedure: IR NEPHROURETERAL ACCESS FOR UROLOGY PCNL Diagnosis:       Nephrolithiasis      (Left nephrostomy tube placement, please convert indwelling left stent to nephro-u)    Scheduled Providers:  Responsible Provider: Amanda King MD    Anesthesia Type: general ASA Status: 2          Preoperative diagnosis:   1  Hydronephrosis of left kidney    2  Nephrolithiasis         Postoperative diagnosis: Same  Surgeon: Aditya Greenwood MD     Assistant: None  No qualified resident was available  Blood loss: 0  Antibiotics: Levofloxacin  Specimens: Gross wire fragment for pathology inspection    Findings:     Left 10 [address] wesley duran nephrostomy placement    Upper pole retained wire fragment removed (2 pieces larger fragment sent for pathology, )    Additional debris present    Upper pole stenosis, however contrast/urine drained freely    Complications: None immediate      Anesthesia: general

## 2023-01-31 ENCOUNTER — PROCEDURE VISIT (OUTPATIENT)
Dept: UROLOGY | Facility: CLINIC | Age: 63
End: 2023-01-31

## 2023-01-31 ENCOUNTER — TELEPHONE (OUTPATIENT)
Dept: UROLOGY | Facility: CLINIC | Age: 63
End: 2023-01-31

## 2023-01-31 VITALS
BODY MASS INDEX: 34.15 KG/M2 | HEART RATE: 84 BPM | DIASTOLIC BLOOD PRESSURE: 80 MMHG | WEIGHT: 200 LBS | HEIGHT: 64 IN | SYSTOLIC BLOOD PRESSURE: 124 MMHG

## 2023-01-31 DIAGNOSIS — Z96.0 RETAINED URETERAL STENT: Primary | ICD-10-CM

## 2023-01-31 DIAGNOSIS — N20.0 STAGHORN CALCULUS: ICD-10-CM

## 2023-01-31 NOTE — PROGRESS NOTES
1/31/2023  Nikhil Crespo is a 63 y o  female  [id number]    Diagnosis: Hydronephrosis of L kidney     Chief Complaint    Nephrostomy capping         Patient presents for nephrostomy tube capping managed by Dr Julio Cesar Mora:  Follow up with Whitney Vivar on Thursday, 2/2/23 for possible removal of nephrostomy tube     Vitals:    01/31/23 1425   BP: 124/80   Pulse: 84   Weight: 90 7 kg (200 lb)   Height: 5' 4" (1 626 m)           No results found for this or any previous visit (from the past 4 hour(s))  Procedure: Nephrostomy tube drainage bag removed and nephrostomy flushed with 5 cc NSS with good urine return  Urine clear yellow in color  Tube capped and dressing changed  Patient advised to contact the office with any issues such as pain, fevers, chills, etc  Also aware of ER precautions         Darinel Tay RN

## 2023-01-31 NOTE — TELEPHONE ENCOUNTER
----- Message from Lala Landon MD sent at 1/30/2023  2:55 PM EST -----  Regarding: cancel case  Please cancel the procedure that I have scheduled with Suzanne Carbone Thursday- Dr Shy Pacheco was successful in removing the foreign body  I have spoken with the patient on the phone    She is going to come into the office tomorrow in [place] to have the nephrostomy tube capped, if she does well with that we will remove it Thursday so we will give us some time before the weekend    Dr Sesar Pena and thank you

## 2023-01-31 NOTE — LETTER
January 31, 2023     Patient: Alex Kramer  YOB: 1960  Date of Visit: 1/31/2023      To Whom it May Concern:    Alex Kramer is under my professional care  Marco Antonio Anyanuha was seen in my office on 1/31/2023  Marco Antonio Bobby may return to work on 2/6/23 without restrictions  If you have any questions or concerns, please don't hesitate to call           Sincerely,      Uli Venegas MD

## 2023-02-01 ENCOUNTER — TELEPHONE (OUTPATIENT)
Dept: UROLOGY | Facility: CLINIC | Age: 63
End: 2023-02-01

## 2023-02-01 ENCOUNTER — TELEPHONE (OUTPATIENT)
Dept: OTHER | Facility: HOSPITAL | Age: 63
End: 2023-02-01

## 2023-02-01 NOTE — TELEPHONE ENCOUNTER
Contacted patient and cancelled tomorrows appointment with Shobha Cornelius  Patient re-scheduled to next Thursday, 2/9/23 @ 0945 with Shobha Cornelius in [place]

## 2023-02-01 NOTE — TELEPHONE ENCOUNTER
----- Message from Roselyn Wagner MD sent at 2/1/2023 10:12 AM EST -----  I just got off the phone with the patient-please have her come in next Thursday, not this Thursday for tube removal   Interventional radiology would like the tube was left in for another week  We will keep it capped  Thank you

## 2023-02-01 NOTE — PROGRESS NOTES
I supervised the Advanced Practitioner  I reviewed the Advanced Practitioner note and agree      Carmellasa Devendra MD 02/01/23

## 2023-02-09 ENCOUNTER — OFFICE VISIT (OUTPATIENT)
Dept: UROLOGY | Facility: CLINIC | Age: 63
End: 2023-02-09

## 2023-02-09 VITALS
HEART RATE: 80 BPM | SYSTOLIC BLOOD PRESSURE: 124 MMHG | BODY MASS INDEX: 34.5 KG/M2 | DIASTOLIC BLOOD PRESSURE: 80 MMHG | WEIGHT: 201 LBS

## 2023-02-09 DIAGNOSIS — N20.0 STAGHORN CALCULUS: Primary | ICD-10-CM

## 2023-02-09 DIAGNOSIS — Z96.0 RETAINED URETERAL STENT: ICD-10-CM

## 2023-02-09 NOTE — PROGRESS NOTES
2/9/2023    No chief complaint on file  Assessment and Plan    66 y o  female manage by Dr Reji Iqbal  1  Retained ureteral stent  · Successful extraction of partially retained left ureteral stent by interventional radiology on 1/30/2023  She had a successful capping trial and the left PCN was removed by me today without incident  · She will have a renal ultrasound in 1 month to ensure no recurrence of hydronephrosis and will then follow-up with me in May for reassessment  Subjective:      Nephrostomy tube was capped on 1/31/2023  Since then she denies any complaints and denies any fevers, chills, gross hematuria, or flank pain  History of Present Illness  Azucena Donald is a 66 y o  female here for follow-up evaluation of capping trial     Has a history of left renal calculi and had originally underwent a cystoscopy, left ureteroscopy and laser lithotripsy of a partial staghorn calculi by Dr Reji Iqbal in May 2021  Following surgery her ureteral stent came out on its own and since she did not present to the office it was unknown if the stent came out intact or not  It appeared that she had a retained stent fragment in the left kidney which was noted on CT scan from November 28, 2022-however, it also could simply be recurrence of stone  She then underwent a cystoscopy, left ureteroscopy with laser lithotripsy by Dr Reji Iqbal on 12/21/2022 where she was found to have a large recurrence of stone about 2 cm which was broken up into tiny fragments  There was a proximal coil of the stent with wire inside for which Dr Reji Iqbal was unable to remove at that time  Plan was for her to undergo a left PCNL with Dr Reji Iqbal on 2/2/2023  Interventional radiology had placed a left PCN on 1/30/2023  At that time Dr Ye Jain with interventional radiology was able to successfully extract the retained stent    The decision was made to cap her nephrostomy tube that she presents today for follow-up evaluation  Procedure:     Left nephrostomy tube was successfully removed in the office today without incident and intact  A clean dry dressing was placed over top  She was advised to contact the office with any issues such as pain, fevers, chills, etc   ER precautions were also reviewed  Review of Systems   Constitutional: Negative for chills and fever  HENT: Negative for ear pain and sore throat  Eyes: Negative for pain and visual disturbance  Respiratory: Negative for cough and shortness of breath  Cardiovascular: Negative for chest pain and palpitations  Gastrointestinal: Negative for abdominal pain, diarrhea, nausea and vomiting  Genitourinary: Negative for difficulty urinating, dysuria, flank pain, frequency, hematuria, pelvic pain and urgency  Musculoskeletal: Negative for arthralgias and back pain  Skin: Negative for color change and rash  Neurological: Negative for dizziness, syncope, weakness and numbness  All other systems reviewed and are negative  Vitals  Vitals:    02/09/23 0944   BP: 124/80   Pulse: 80   Weight: 91 2 kg (201 lb)       Physical Exam  Vitals reviewed  Constitutional:       General: She is not in acute distress  Appearance: Normal appearance  She is normal weight  She is not ill-appearing or toxic-appearing  HENT:      Head: Normocephalic and atraumatic  Nose: Nose normal    Eyes:      General: No scleral icterus  Conjunctiva/sclera: Conjunctivae normal    Cardiovascular:      Rate and Rhythm: Normal rate  Pulses: Normal pulses  Pulmonary:      Effort: Pulmonary effort is normal  No respiratory distress  Abdominal:      General: Abdomen is flat  Palpations: Abdomen is soft  Tenderness: There is no abdominal tenderness  There is no right CVA tenderness or left CVA tenderness  Hernia: No hernia is present     Genitourinary:     Comments: Left PCN capped  Musculoskeletal:         General: Normal range of motion  Cervical back: Normal range of motion  Skin:     General: Skin is warm and dry  Neurological:      General: No focal deficit present  Mental Status: She is alert and oriented to person, place, and time  Mental status is at baseline  Psychiatric:         Mood and Affect: Mood normal          Behavior: Behavior normal          Thought Content: Thought content normal          Judgment: Judgment normal          Past History  Past Medical History:   Diagnosis Date   • Breast cancer ([facility] )     Pt had in left breast- had radiation and surgery   • COVID-19     pt had 2 times- ; 2022   • CPAP (continuous positive airway pressure) dependence     Pt uses nightly   • GERD (gastroesophageal reflux disease)    • H/O cervical fracture    • Hyperlipidemia    • Irregular heart beat     Pt is taking Zetia   Pt does not have happen anymore per pt since on medication   • Liver disease     fatty liver   • Pancreatitis    • Rheumatoid arthritis ([place] [address] )    • Seasonal allergies    • Sleep apnea    • Wears glasses      Social History     Socioeconomic History   • Marital status: /Civil Union     Spouse name: None   • Number of children: None   • Years of education: None   • Highest education level: None   Occupational History   • None   Tobacco Use   • Smoking status: Former     Types: Cigarettes     Quit date:      Years since quittin 1   • Smokeless tobacco: Never   Vaping Use   • Vaping Use: Never used   Substance and Sexual Activity   • Alcohol use: Yes     Comment: rarely   • Drug use: Not Currently   • Sexual activity: None     Comment: did not want   Other Topics Concern   • None   Social History Narrative   • None     Social Determinants of Health     Financial Resource Strain: Not on file   Food Insecurity: No Food Insecurity   • Worried About Running Out of Food in the Last Year: Never true   • Ran Out of Food in the Last Year: Never true   Transportation Needs: No Transportation Needs   • Lack of Transportation (Medical): No   • Lack of Transportation (Non-Medical): No   Physical Activity: Not on file   Stress: Not on file   Social Connections: Not on file   Intimate Partner Violence: Not on file   Housing Stability: Low Risk    • Unable to Pay for Housing in the Last Year: No   • Number of Places Lived in the Last Year: 1   • Unstable Housing in the Last Year: No     Social History     Tobacco Use   Smoking Status Former   • Types: Cigarettes   • Quit date:    • Years since quittin 1   Smokeless Tobacco Never     Family History   Problem Relation Age of Onset   • Lung cancer Father    • Heart disease Maternal Aunt    • Heart disease Maternal Uncle    • Kidney cancer Maternal Grandfather    • Lung cancer Maternal Grandfather    • Heart attack Brother        The following portions of the patient's history were reviewed and updated as appropriate allergies, current medications, past medical history, past social history, past surgical history and problem list    Imaging:    Results  No results found for this or any previous visit (from the past 1 hour(s))  ]  No results found for: PSA  Lab Results   Component Value Date    CALCIUM 9 3 2022    K 3 3 (L) 2022    CO2 26 2022     2022    BUN 19 2022    CREATININE 1 28 2022     Lab Results   Component Value Date    WBC 10 74 (H) 2023    HGB 11 9 2023    HCT 36 8 2023    MCV 88 2023     2023       Please Note:  Voice dictation software has been used to create this document  There may be inadvertent transcriptions errors       EDWARD Barger 23

## 2023-03-09 ENCOUNTER — HOSPITAL ENCOUNTER (OUTPATIENT)
Dept: ULTRASOUND IMAGING | Facility: HOSPITAL | Age: 63
End: 2023-03-09

## 2023-03-09 DIAGNOSIS — Z96.0 RETAINED URETERAL STENT: ICD-10-CM

## 2023-03-09 DIAGNOSIS — N20.0 STAGHORN CALCULUS: ICD-10-CM

## 2023-03-14 NOTE — RESULT ENCOUNTER NOTE
Please the patient know that her renal ultrasound shows no evidence of hydronephrosis following removal of her PCN  There are some residual stones within the left kidney  We can discuss this in more detail at her follow-up appointment with me in May

## 2023-03-27 ENCOUNTER — HOSPITAL ENCOUNTER (EMERGENCY)
Facility: HOSPITAL | Age: 63
Discharge: HOME | End: 2023-03-27
Attending: EMERGENCY MEDICINE

## 2023-03-27 VITALS
BODY MASS INDEX: 33.46 KG/M2 | HEART RATE: 71 BPM | SYSTOLIC BLOOD PRESSURE: 149 MMHG | DIASTOLIC BLOOD PRESSURE: 77 MMHG | TEMPERATURE: 97.4 F | RESPIRATION RATE: 18 BRPM | HEIGHT: 64 IN | WEIGHT: 195.99 LBS | OXYGEN SATURATION: 99 %

## 2023-03-27 DIAGNOSIS — R42 LIGHTHEADEDNESS: Primary | ICD-10-CM

## 2023-03-27 DIAGNOSIS — R42 VERTIGO: ICD-10-CM

## 2023-03-27 LAB
ALBUMIN SERPL BCP-MCNC: 4.2 G/DL (ref 3.5–5)
ALP SERPL-CCNC: 115 U/L (ref 34–104)
ALT SERPL W P-5'-P-CCNC: 29 U/L (ref 7–52)
ANION GAP SERPL CALCULATED.3IONS-SCNC: 6 MMOL/L (ref 4–13)
AST SERPL W P-5'-P-CCNC: 24 U/L (ref 13–39)
BASOPHILS # BLD AUTO: 0.06 THOUSANDS/ÂΜL (ref 0–0.1)
BASOPHILS NFR BLD AUTO: 1 % (ref 0–1)
BILIRUB SERPL-MCNC: 0.28 MG/DL (ref 0.2–1)
BUN SERPL-MCNC: 14 MG/DL (ref 5–25)
CALCIUM SERPL-MCNC: 9.6 MG/DL (ref 8.4–10.2)
CHLORIDE SERPL-SCNC: 105 MMOL/L (ref 96–108)
CO2 SERPL-SCNC: 28 MMOL/L (ref 21–32)
CREAT SERPL-MCNC: 1.02 MG/DL (ref 0.6–1.3)
EOSINOPHIL # BLD AUTO: 0.42 THOUSAND/ÂΜL (ref 0–0.61)
EOSINOPHIL NFR BLD AUTO: 7 % (ref 0–6)
ERYTHROCYTE [DISTWIDTH] IN BLOOD BY AUTOMATED COUNT: 13.2 % (ref 11.6–15.1)
GFR SERPL CREATININE-BSD FRML MDRD: 59 ML/MIN/1.73SQ M
GLUCOSE SERPL-MCNC: 88 MG/DL (ref 65–140)
HCT VFR BLD AUTO: 40.2 % (ref 34.8–46.1)
HGB BLD-MCNC: 12.7 G/DL (ref 11.5–15.4)
IMM GRANULOCYTES # BLD AUTO: 0.02 THOUSAND/UL (ref 0–0.2)
IMM GRANULOCYTES NFR BLD AUTO: 0 % (ref 0–2)
LYMPHOCYTES # BLD AUTO: 1.54 THOUSANDS/ÂΜL (ref 0.6–4.47)
LYMPHOCYTES NFR BLD AUTO: 27 % (ref 14–44)
MCH RBC QN AUTO: 27.5 PG (ref 26.8–34.3)
MCHC RBC AUTO-ENTMCNC: 31.6 G/DL (ref 31.4–37.4)
MCV RBC AUTO: 87 FL (ref 82–98)
MONOCYTES # BLD AUTO: 0.62 THOUSAND/ÂΜL (ref 0.17–1.22)
MONOCYTES NFR BLD AUTO: 11 % (ref 4–12)
NEUTROPHILS # BLD AUTO: 3.08 THOUSANDS/ÂΜL (ref 1.85–7.62)
NEUTS SEG NFR BLD AUTO: 54 % (ref 43–75)
NRBC BLD AUTO-RTO: 0 /100 WBCS
PLATELET # BLD AUTO: 283 THOUSANDS/UL (ref 149–390)
PMV BLD AUTO: 10.2 FL (ref 8.9–12.7)
POTASSIUM SERPL-SCNC: 3.9 MMOL/L (ref 3.5–5.3)
PROT SERPL-MCNC: 7.4 G/DL (ref 6.4–8.4)
RBC # BLD AUTO: 4.62 MILLION/UL (ref 3.81–5.12)
SODIUM SERPL-SCNC: 139 MMOL/L (ref 135–147)
TSH SERPL DL<=0.05 MIU/L-ACNC: 4.13 UIU/ML (ref 0.45–4.5)
WBC # BLD AUTO: 5.74 THOUSAND/UL (ref 4.31–10.16)

## 2023-03-27 RX ORDER — MECLIZINE HYDROCHLORIDE 25 MG/1
25 TABLET ORAL ONCE
Status: COMPLETED | OUTPATIENT
Start: 2023-03-27 | End: 2023-03-27

## 2023-03-27 RX ORDER — MECLIZINE HYDROCHLORIDE 25 MG/1
25 TABLET ORAL 3 TIMES DAILY PRN
Qty: 30 TABLET | Refills: 0 | Status: SHIPPED | OUTPATIENT
Start: 2023-03-27

## 2023-03-27 RX ADMIN — MECLIZINE HYDROCHLORIDE 25 MG: 25 TABLET ORAL at 12:32

## 2023-03-27 NOTE — ED PROVIDER NOTES
History  Chief Complaint   Patient presents with   • Dizziness     Pt feeling dizzy most of the weekend, pt thought it was her sinues but was seen by ENT doc today and was told they were fine  Pt took BP at home and it was 180/105  Pt called PCP and was unable to be seen today and was told to come to ER     Patient is a 53-year-old female presenting to the emergency department on advice from PCP secondary to lightheadedness/dizziness has been going on for the past 2 to 3 days, patient initially thought she had a sinus congestion leading to her dizziness, however she was seen by ENT today and was advised that this was not the case, she also noted that her blood pressure was high 189/105 at home, she called her PCP office for follow-up and they advised her to be seen at urgent care or in the emergency department, patient reports some pressure in her head, no fever or chills, no nausea, vomiting or diarrhea, no movement or change in position leads to triggering of symptoms, she denies any chest pain or shortness of breath, no recent illness or injury          Prior to Admission Medications   Prescriptions Last Dose Informant Patient Reported? Taking?    Ascorbic Acid (Vitamin C) 500 MG CAPS   Yes Yes   Sig: Take 1 capsule by mouth in the morning   Cholecalciferol 50 MCG (2000 UT) CAPS   Yes Yes   Sig: Take 1 capsule by mouth daily 1000 units daily   Magnesium 100 MG CAPS Not Taking  Yes No   Sig: Take by mouth daily    Patient not taking: Reported on 1/10/2023   Multiple Vitamins-Minerals (Multivitamin Gummies Womens) CHEW   Yes Yes   Sig: Chew 2 each daily   cyanocobalamin (VITAMIN B-12) 1000 MCG tablet   Yes Yes   Sig: Take 1,000 mcg by mouth daily   ezetimibe (ZETIA) 10 mg tablet   Yes No   Sig: Take 10 mg by mouth daily    fluticasone (FLONASE) 50 mcg/act nasal spray   Yes Yes   Si spray into each nostril as needed    leflunomide (ARAVA) 20 MG tablet   Yes Yes   Sig: Take 20 mg by mouth daily   montelukast (SINGULAIR) 10 mg tablet   Yes Yes   Sig: Take 10 mg by mouth daily   pantoprazole (PROTONIX) 40 mg tablet   Yes Yes   Sig: Take 40 mg by mouth daily   sodium chloride, PF, 0 9 %   No Yes   Sig: 10 mL by Intracatheter route daily Intracatheter flushing daily  May substitute prefilled syringe with normal saline 10 mL vials, 10 mL syringes, and 18 g blunt needles      Facility-Administered Medications: None       Past Medical History:   Diagnosis Date   • Breast cancer ([facility] )     Pt had in left breast- had radiation and surgery   • COVID-19     pt had 2 times- ; 2022   • CPAP (continuous positive airway pressure) dependence     Pt uses nightly   • GERD (gastroesophageal reflux disease)    • H/O cervical fracture    • Hyperlipidemia    • Irregular heart beat     Pt is taking Zetia   Pt does not have happen anymore per pt since on medication   • Liver disease     fatty liver   • Pancreatitis    • Pericarditis    • Rheumatoid arthritis ([facility] )    • Seasonal allergies    • Sleep apnea    • Wears glasses        Past Surgical History:   Procedure Laterality Date   • BLADDER SUSPENSION     • BREAST LUMPECTOMY     •  SECTION      x2   • COLONOSCOPY     • EGD     • FL RETROGRADE PYELOGRAM  2021   • FOOT SURGERY Right    • HYSTERECTOMY     • IR NEPHROURETERAL ACCESS FOR UROLOGY PCNL  2023   • MO CYSTO BLADDER W/URETERAL CATHETERIZATION Left 2021    Procedure: CYSTOSCOPY RETROGRADE PYELOGRAM WITH INSERTION STENT URETERAL;  Surgeon: Janak Acevedo MD;  Location:  MAIN OR;  Service: Urology   • MO CYSTO/URETERO W/LITHOTRIPSY &INDWELL STENT INSRT Left 2021    Procedure: CYSTOSCOPY URETEROSCOPY WITH LITHOTRIPSY HOLMIUM LASER,  AND INSERTION STENT URETERAL;  Surgeon: Miri Sauceda MD;  Location:  MAIN OR;  Service: Urology   • MO CYSTO/URETERO W/LITHOTRIPSY &INDWELL STENT INSRT Left 2022    Procedure: CYSTOSCOPY URETEROSCOPY WITH LITHOTRIPSY HOLMIUM LASER,  INSERTION STENT URETERAL, attempted removal of foreign body;  Surgeon: Claudia Burrows MD;  Location: [facility];  Service: Urology   • SINUS SURGERY      Deviated septum       Family History   Problem Relation Age of Onset   • Lung cancer Father    • Heart disease Maternal Aunt    • Heart disease Maternal Uncle    • Kidney cancer Maternal Grandfather    • Lung cancer Maternal Grandfather    • Heart attack Brother      I have reviewed and agree with the history as documented  E-Cigarette/Vaping   • E-Cigarette Use Never User      E-Cigarette/Vaping Substances   • Nicotine No    • THC No    • CBD No    • Flavoring No    • Other No    • Unknown No      Social History     Tobacco Use   • Smoking status: Former     Types: Cigarettes     Quit date:      Years since quittin 2   • Smokeless tobacco: Never   Vaping Use   • Vaping Use: Never used   Substance Use Topics   • Alcohol use: Yes     Comment: rarely   • Drug use: Not Currently       Review of Systems   Constitutional: Negative  HENT: Negative  Eyes: Negative  Respiratory: Negative  Cardiovascular: Negative  Gastrointestinal: Negative  Endocrine: Negative  Genitourinary: Negative  Musculoskeletal: Negative  Skin: Negative  Allergic/Immunologic: Negative  Neurological: Positive for dizziness, light-headedness and headaches  Hematological: Negative  Psychiatric/Behavioral: Negative  Physical Exam  Physical Exam  Constitutional:       Appearance: She is well-developed  HENT:      Head: Normocephalic and atraumatic  Nose: Nose normal       Mouth/Throat:      Mouth: Mucous membranes are moist    Eyes:      Conjunctiva/sclera: Conjunctivae normal       Pupils: Pupils are equal, round, and reactive to light  Cardiovascular:      Rate and Rhythm: Normal rate and regular rhythm  Heart sounds: Normal heart sounds  Pulmonary:      Effort: Pulmonary effort is normal       Breath sounds: Normal breath sounds     Abdominal: Palpations: Abdomen is soft  Musculoskeletal:         General: Normal range of motion  Cervical back: Normal range of motion and neck supple  Skin:     General: Skin is warm and dry  Neurological:      Mental Status: She is alert and oriented to person, place, and time           Vital Signs  ED Triage Vitals [03/27/23 1126]   Temperature Pulse Respirations Blood Pressure SpO2   (!) 97 4 °F (36 3 °C) 98 18 (!) 173/106 100 %      Temp Source Heart Rate Source Patient Position - Orthostatic VS BP Location FiO2 (%)   Temporal Monitor Sitting Left arm --      Pain Score       No Pain           Vitals:    03/27/23 1126 03/27/23 1200 03/27/23 1245 03/27/23 1300   BP: (!) 173/106 161/78 136/73 149/77   Pulse: 98 66 60 71   Patient Position - Orthostatic VS: Sitting Lying Lying Lying         Visual Acuity  Visual Acuity    Flowsheet Row Most Recent Value   L Pupil Size (mm) 3   R Pupil Size (mm) 3          ED Medications  Medications   meclizine (ANTIVERT) tablet 25 mg (25 mg Oral Given 3/27/23 1232)       Diagnostic Studies  Results Reviewed     Procedure Component Value Units Date/Time    TSH [[id number]]  (Normal) Collected: 03/27/23 1228    Lab Status: Final result Specimen: Blood from Arm, Right Updated: 03/27/23 1309     TSH 3RD GENERATON 4 126 uIU/mL     Comprehensive metabolic panel [[id number]]  (Abnormal) Collected: 03/27/23 1228    Lab Status: Final result Specimen: Blood from Arm, Right Updated: 03/27/23 1255     Sodium 139 mmol/L      Potassium 3 9 mmol/L      Chloride 105 mmol/L      CO2 28 mmol/L      ANION GAP 6 mmol/L      BUN 14 mg/dL      Creatinine 1 02 mg/dL      Glucose 88 mg/dL      Calcium 9 6 mg/dL      AST 24 U/L      ALT 29 U/L      Alkaline Phosphatase 115 U/L      Total Protein 7 4 g/dL      Albumin 4 2 g/dL      Total Bilirubin 0 28 mg/dL      eGFR 59 ml/min/1 73sq m     Narrative:      [place] guidelines for Chronic Kidney Disease (CKD):   •  Stage 1 with normal or high GFR (GFR > 90 mL/min/1 73 square meters)  •  Stage 2 Mild CKD (GFR = 60-89 mL/min/1 73 square meters)  •  Stage 3A Moderate CKD (GFR = 45-59 mL/min/1 73 square meters)  •  Stage 3B Moderate CKD (GFR = 30-44 mL/min/1 73 square meters)  •  Stage 4 Severe CKD (GFR = 15-29 mL/min/1 73 square meters)  •  Stage 5 End Stage CKD (GFR <15 mL/min/1 73 square meters)  Note: GFR calculation is accurate only with a steady state creatinine    CBC and differential [[id number]]  (Abnormal) Collected: 03/27/23 1228    Lab Status: Final result Specimen: Blood from Arm, Right Updated: 03/27/23 1232     WBC 5 74 Thousand/uL      RBC 4 62 Million/uL      Hemoglobin 12 7 g/dL      Hematocrit 40 2 %      MCV 87 fL      MCH 27 5 pg      MCHC 31 6 g/dL      RDW 13 2 %      MPV 10 2 fL      Platelets 435 Thousands/uL      nRBC 0 /100 WBCs      Neutrophils Relative 54 %      Immat GRANS % 0 %      Lymphocytes Relative 27 %      Monocytes Relative 11 %      Eosinophils Relative 7 %      Basophils Relative 1 %      Neutrophils Absolute 3 08 Thousands/µL      Immature Grans Absolute 0 02 Thousand/uL      Lymphocytes Absolute 1 54 Thousands/µL      Monocytes Absolute 0 62 Thousand/µL      Eosinophils Absolute 0 42 Thousand/µL      Basophils Absolute 0 06 Thousands/µL                  No orders to display              Procedures  ECG 12 Lead Documentation Only    Date/Time: 3/27/2023 12:23 PM  Performed by: Gumaro Hart DO  Authorized by: Gmuaro Hart DO     Indications / Diagnosis:  Dizziness  ECG reviewed by me, the ED Provider: yes    Patient location:  ED  Previous ECG:     Previous ECG:  Unavailable    Comparison to cardiac monitor: Yes    Interpretation:     Interpretation: normal    Rate:     ECG rate:  63    ECG rate assessment: normal    Rhythm:     Rhythm: sinus rhythm    Ectopy:     Ectopy: none    QRS:     QRS intervals:  Normal  Conduction:     Conduction: normal    ST segments:     ST segments:  Normal  T waves: T waves: normal               ED Course                                             Medical Decision Making  Patient is a 61-year-old female presenting to the emergency department complaining of dizziness that started over the weekend, it is actually improved today but still persists, she reports some head pressure, thought she might have a sinus infection but saw ENT earlier today and was told her exam was unremarkable, she called her PCP for follow-up and noted her blood pressure to be elevated at home, they were unable to see her today so she came to the emergency department instead, on exam patient is resting comfortably, appears in no acute distress, vital signs are stable, blood pressure is mildly elevated, laboratory findings are unremarkable and discussed with patient at bedside, she does report feeling improvement of symptoms with treatment in the emergency department, she was discharged home with a prescription for meclizine and advisement to follow-up with PCP in 1 to 2 days or return if symptoms worsen, patient acknowledges understanding and agreement with this plan    Lightheadedness: acute illness or injury  Vertigo: acute illness or injury  Amount and/or Complexity of Data Reviewed  External Data Reviewed: notes  Labs: ordered  Disposition  Final diagnoses:   Lightheadedness   Vertigo     Time reflects when diagnosis was documented in both MDM as applicable and the Disposition within this note     Time User Action Codes Description Comment    3/27/2023  1:15 PM Juan Diego Marquez Add [R42] Lightheadedness     3/27/2023  1:15 PM Anai Teran Add [R42] Vertigo       ED Disposition     ED Disposition   Discharge    Condition   Stable    Date/Time   Mon Mar 27, 2023  1:15 PM    Comment   Trae Workman discharge to home/self care                 Follow-up Information     Follow up With Specialties Details Why Marivel Good MD Family Medicine In 2 days  [id number]  [phone]            Discharge Medication List as of 3/27/2023  1:16 PM      START taking these medications    Details   meclizine (ANTIVERT) 25 mg tablet Take 1 tablet (25 mg total) by mouth 3 (three) times a day as needed for dizziness, Starting Mon 3/27/2023, Normal         CONTINUE these medications which have NOT CHANGED    Details   Ascorbic Acid (Vitamin C) 500 MG CAPS Take 1 capsule by mouth in the morning, Historical Med      Cholecalciferol 50 MCG (2000 UT) CAPS Take 1 capsule by mouth daily 1000 units daily, Historical Med      cyanocobalamin (VITAMIN B-12) 1000 MCG tablet Take 1,000 mcg by mouth daily, Historical Med      ezetimibe (ZETIA) 10 mg tablet Take 10 mg by mouth daily , Starting Sat 11/7/2020, Until Mon 1/30/2023, Historical Med      fluticasone (FLONASE) 50 mcg/act nasal spray 1 spray into each nostril as needed , Historical Med      leflunomide (ARAVA) 20 MG tablet Take 20 mg by mouth daily, Historical Med      Magnesium 100 MG CAPS Take by mouth daily , Historical Med      montelukast (SINGULAIR) 10 mg tablet Take 10 mg by mouth daily, Historical Med      Multiple Vitamins-Minerals (Multivitamin Gummies Womens) CHEW Chew 2 each daily, Historical Med      pantoprazole (PROTONIX) 40 mg tablet Take 40 mg by mouth daily, Starting Mon 11/23/2020, Historical Med      sodium chloride, PF, 0 9 % 10 mL by Intracatheter route daily Intracatheter flushing daily  May substitute prefilled syringe with normal saline 10 mL vials, 10 mL syringes, and 18 g blunt needles, Starting Mon 1/30/2023, Until Sun 4/30/2023, Normal             No discharge procedures on file      PDMP Review       Value Time User    PDMP Reviewed  Yes 2/21/2022 10:53 AM Kavon Owens MD          ED Provider  Electronically Signed by           Savannah Talamantes DO  03/27/23 7111

## 2023-03-29 LAB
ATRIAL RATE: 63 BPM
P AXIS: 24 DEGREES
PR INTERVAL: 164 MS
QRS AXIS: 26 DEGREES
QRSD INTERVAL: 76 MS
QT INTERVAL: 422 MS
QTC INTERVAL: 431 MS
T WAVE AXIS: 40 DEGREES
VENTRICULAR RATE: 63 BPM

## 2023-05-02 ENCOUNTER — CONSULT (OUTPATIENT)
Dept: NEPHROLOGY | Facility: CLINIC | Age: 63
End: 2023-05-02

## 2023-05-02 VITALS
HEIGHT: 64 IN | SYSTOLIC BLOOD PRESSURE: 132 MMHG | OXYGEN SATURATION: 97 % | BODY MASS INDEX: 34.45 KG/M2 | DIASTOLIC BLOOD PRESSURE: 80 MMHG | HEART RATE: 78 BPM | WEIGHT: 201.8 LBS

## 2023-05-02 DIAGNOSIS — I10 ESSENTIAL HYPERTENSION: Primary | ICD-10-CM

## 2023-05-02 DIAGNOSIS — M05.79 RHEUMATOID ARTHRITIS INVOLVING MULTIPLE SITES WITH POSITIVE RHEUMATOID FACTOR (HCC): ICD-10-CM

## 2023-05-02 DIAGNOSIS — N20.0 STAGHORN CALCULUS: ICD-10-CM

## 2023-05-02 RX ORDER — ATORVASTATIN CALCIUM 40 MG/1
40 TABLET, FILM COATED ORAL
COMMUNITY
Start: 2023-04-17

## 2023-05-02 RX ORDER — AMLODIPINE BESYLATE 5 MG/1
5 TABLET ORAL 2 TIMES DAILY
COMMUNITY
Start: 2023-03-30

## 2023-05-02 NOTE — PROGRESS NOTES
[facility]  Consultation - Nephrology    Tamia Camacho 66 y o  female MRN: [id number]      A/P:  1      1  Essential hypertension  -     Aldosterone/Renin Ratio; Future  -     Metanephrine, Fractionated Plasma Free; Future  -     VAS renal artery complete; Future; Expected date: 05/02/2023  -     PTH, intact; Future; Expected date: 07/19/2023  -     Uric acid; Future; Expected date: 07/19/2023  -     Comprehensive metabolic panel; Future; Expected date: 07/19/2023  -     Urinalysis with microscopic; Future; Expected date: 07/19/2023  -     Microalbumin / creatinine urine ratio; Future; Expected date: 07/19/2023    2  Staghorn calculus  -     Ambulatory Referral to Nephrology  -     Stone risk profile    3  Rheumatoid arthritis involving multiple sites with positive rheumatoid factor ([facility] )         I have reviewed pertinent labs and imaging with patient  1   Hypertension likely essential   We will check a secondary work-up, she was just diagnosed recently and had a cardiac work-up as well  Check vascular renal ultrasound, metanephrines, aldosterone to renin ratio  Quantify proteinuria  Blood pressure 132/80, under reasonable control at present  Did not adjust any medications recently  Explained etiologies of secondary hypertension with patient  Continue amlodipine 5 mg twice daily which was just recently increased  Maintain a low-sodium diet  She will follow-up with cardiology for the results of her work-up  2   Nephrolithiasis/staghorn calculi, reviewed stone analysis from 12/21/2022   80% carbonate appetite and 20% magnesium ammonium phosphate  Recommend stone risk profile  Explained how to perform a stone risk profile  This will elucidate her risk factors further  Maintain a low-sodium diet and increase fluid intake to 2 5 to 3 L/day  Reviewed common risk factors for calcium phosphate stones    Magnesium ammonium phosphate stones can be more difficult to treat and the goal is generally infection control and urologic management for staghorn calculi  No medications added during this visit  3   Rheumatoid arthritis, continue management per rheumatology  She is currently on leflunomide  The patient and any family members present were counseled today on risks benefits and alternatives of any medications, and were agreeable to the treatment plan  They were counseled on diagnostic testing if necessary, and projected outcomes as are available and medically indicated  All questions were asked and answered during the encounter  If more questions are to arise the patient will call the office  Alarm and return precautions discussed and accepted  Thank you for allowing us to participate in the care of your patient  History of Present Illness   Physician Requesting Consult: No att  providers found    HPI: Prince Armendariz is a 66y o  year old female who stone prevention analysis  Following with urology for this  Previously drinking a lot of ice tea  She had lithotripsy x 2 in 2021 and 2022  She had a cystoscopy/ureteroscopy with lithotripsy and indwelling stent insertion on 5/19/2021 and again on 12/21/2022  Now just drinking water about 2 Liter per day  Reports hx recurrent UTI in the past  Last UTI in Feb      Diagnosed with HTN just recently a month ago that started with dizziness requiring ER visit  Pt on amlodipine 5 mg BID  /70-80  She had echo, Carotid duplex and Ct head for HTN evaluation  Seen by cardiology for HTN workup  One week ago norvasc increased from 5mg per day to BID  No significant narrowing of the carotid  CT of the head showed fluid left in left maxillary sinus  Reports intermittent dizziness  She has rheumatoid arthritis and follows with rheumatologist  She maintained on arava  Previously thought she may have had pericarditis  Cardiology felt she did not have pericarditis       Had breast cancer in 1998 and received radiation and chemotherapy  She had reaction to chemotherapy and never resumed therapy  She had hx sleep apnea and wears CPAP with good compliance  Follows with GI and going for endoscopy and colonoscopy on 5/24  She reports pancreatitis in November  She reports pregnancy x 2 ,no eclapmsia, preclampsia, HELP syndrome  Watches a low sodium diet, does not eat out much  Avoid NSAID  Constitutional ROS- Denies fatigue, fever, chills, night sweats, weight changes  HEENT ROS- Denies headaches or history of trauma, blurred vision     Endocrine ROS- No history diabetes mellitus or thyroid disease  Cardiovascular ROS- Denies chest pain, palpitation, dyspnea exertion, orthopnea, claudication  Pulmonary ROS-  Denies cough, hemoptysis, shortness of breath  GI ROS- Denies abdominal pain, diarrhea, nausea, swallowing problems, vomiting, constipation, blood in stools, fecal incontinence  Hematological ROS- Denies history of easy bruising, blood clots, bleeding or blood transfusions  Genitourinary ROS- + nephrolithaisis  Lymphatic ROS- Denies lymphadenopathy  Musculoskeletal ROS- + rheamatoid arthritis   Dermatological ROS- Denies rash, wounds, ulcers, itching, jaundice  Psychiatric ROS- Denies hallucinations, disorientation  Neurological ROS- No stroke or TIA symptoms  Historical Information   Past Medical History:   Diagnosis Date   • [facility]    Pt had in left breast- had radiation and surgery   • COVID-19     pt had 2 times- 2020; 1/2022   • CPAP (continuous positive airway pressure) dependence     Pt uses nightly   • GERD (gastroesophageal reflux disease)    • H/O cervical fracture    • Hyperlipidemia    • Irregular heart beat     Pt is taking Zetia   Pt does not have happen anymore per pt since on medication   • Liver disease     fatty liver   • Pancreatitis    • Pericarditis    • Rheumatoid arthritis ([facility] )    • Seasonal allergies    • Sleep apnea    • Wears glasses      Past Surgical History:   Procedure Laterality Date   • BLADDER SUSPENSION     • BREAST LUMPECTOMY     •  SECTION      x2   • COLONOSCOPY     • EGD     • FL RETROGRADE PYELOGRAM  2021   • FOOT SURGERY Right    • HYSTERECTOMY     • IR NEPHROURETERAL ACCESS FOR UROLOGY PCNL  2023   • KY CYSTO BLADDER W/URETERAL CATHETERIZATION Left 2021    Procedure: CYSTOSCOPY RETROGRADE PYELOGRAM WITH INSERTION STENT URETERAL;  Surgeon: Yulia Armstrong MD;  [facility];  Service: Urology   • KY CYSTO/URETERO W/LITHOTRIPSY &INDWELL STENT INSRT Left 2021    Procedure: CYSTOSCOPY URETEROSCOPY WITH LITHOTRIPSY HOLMIUM LASER,  AND INSERTION STENT URETERAL;  Surgeon: Cong Keys MD;  Location: [facility];  Service: Urology   • KY CYSTO/URETERO W/LITHOTRIPSY &INDWELL STENT INSRT Left 2022    Procedure: CYSTOSCOPY URETEROSCOPY WITH LITHOTRIPSY HOLMIUM LASER,  INSERTION STENT URETERAL, attempted removal of foreign body;  Surgeon: Cong Keys MD;  Location[facility];  Service: Urology   • SINUS SURGERY      Deviated septum     Social History   Social History     Substance and Sexual Activity   Alcohol Use Yes    Comment: rarely     Social History     Substance and Sexual Activity   Drug Use Not Currently     Social History     Tobacco Use   Smoking Status Former   • Types: Cigarettes   • Quit date:    • Years since quittin 3   Smokeless Tobacco Never     Family History   Problem Relation Age of Onset   • Lung cancer Father    • Heart disease Maternal Aunt    • Heart disease Maternal Uncle    • Kidney cancer Maternal Grandfather    • Lung cancer Maternal Grandfather    • Heart attack Brother        Meds/Allergies   all current active meds have been reviewed, current meds:     Current Outpatient Medications:   •  amLODIPine (NORVASC) 5 mg tablet, Take 5 mg by mouth 2 (two) times a day, Disp: , Rfl:   •  Ascorbic Acid (Vitamin C) 500 MG CAPS, Take 1 "capsule by mouth in the morning, Disp: , Rfl:   •  atorvastatin (LIPITOR) 40 mg tablet, Take 40 mg by mouth, Disp: , Rfl:   •  Cholecalciferol 50 MCG (2000 UT) CAPS, Take 1 capsule by mouth daily 1000 units daily, Disp: , Rfl:   •  cyanocobalamin (VITAMIN B-12) 1000 MCG tablet, Take 1,000 mcg by mouth daily, Disp: , Rfl:   •  ezetimibe (ZETIA) 10 mg tablet, Take 10 mg by mouth daily , Disp: , Rfl:   •  fluticasone (FLONASE) 50 mcg/act nasal spray, 1 spray into each nostril as needed , Disp: , Rfl:   •  leflunomide (ARAVA) 20 MG tablet, Take 20 mg by mouth daily, Disp: , Rfl:   •  montelukast (SINGULAIR) 10 mg tablet, Take 10 mg by mouth daily, Disp: , Rfl:   •  Multiple Vitamins-Minerals (Multivitamin Gummies Womens) CHEW, Wilkerson 2 each daily, Disp: , Rfl:   •  pantoprazole (PROTONIX) 40 mg tablet, Take 40 mg by mouth daily, Disp: , Rfl:   •  Magnesium 100 MG CAPS, Take by mouth daily  (Patient not taking: Reported on 1/10/2023), Disp: , Rfl:   •  meclizine (ANTIVERT) 25 mg tablet, Take 1 tablet (25 mg total) by mouth 3 (three) times a day as needed for dizziness (Patient not taking: Reported on 5/2/2023), Disp: 30 tablet, Rfl: 0  •  sodium chloride, PF, 0 9 %, 10 mL by Intracatheter route daily Intracatheter flushing daily  May substitute prefilled syringe with normal saline 10 mL vials, 10 mL syringes, and 18 g blunt needles, Disp: 900 mL, Rfl: 1     Allergies   Allergen Reactions   • Azathioprine Swelling     Pt reports face gets \"puffy\", red and face feels hot  • Ciprofloxacin Hives     ? Rash     • Amoxicillin-Pot Clavulanate Hives   • Celecoxib Rash   • Colchicine Rash   • Penicillins Rash       Objective     Vitals:    05/02/23 0813   BP: 132/80   Pulse: 78   SpO2: 97%       General Appearance:    No acute distress  Cooperative  Appears stated age  Head:    Normocephalic  Atraumatic  Eyes:    Lids, conjunctiva normal  No scleral icterus  Ears:    Normal external ears     Nose:   Nares normal  No " drainage  Mouth:   Lips, tongue normal  Mucosa normal     Neck:   Supple  Symmetrical    Back:     Symmetric  No CVA tenderness  Lungs:     Normal respiratory effort  Clear to auscultation bilaterally  Chest wall:    No tenderness or deformity  Heart:    Regular rate and rhythm  Normal S1 and S2  No murmur  No JVD  Trace edema    Abdomen:     Soft  Non-tender  Bowel sounds active  Genitourinary:   No Ferrell catheter present  Extremities:   Extremities normal  Atraumatic  No cyanosis  Skin:   Warm and dry  No pallor, jaundice, rash, ecchymoses  Neurologic:   Alert and oriented to person, place, time  No focal deficit  Current Weight: Weight - Scale: 91 5 kg (201 lb 12 8 oz)    First Weight:    Wt Readings from Last 3 Encounters:   05/02/23 91 5 kg (201 lb 12 8 oz)   03/27/23 88 9 kg (195 lb 15 8 oz)   02/09/23 91 2 kg (201 lb)        Lab Results:  I have personally reviewed pertinent labs  4/14/2023 creatinine 1 04 with EGFR 61 mg minute  3/27/23 sodium 139 potassium 3 9 chloride 105T CO2 28 BUN 14 creatinine 1 02 glucose 88 calcium 9 6  Radiology review:  No results found  Maxwell Centeno DO      This consultation note was produced in part using a dictation device which may document imprecise wording from author's original intent

## 2023-05-02 NOTE — PATIENT INSTRUCTIONS
Recommend fluid intake of 2 5 to 3 L/day to prevent stones  Recommend checking a 24-hour stone risk profile to look at risk factors for stone formation  Recommend a secondary hypertension work-up including evaluation of the kidney arteries with an ultrasound and blood work to evaluate for secondary causes of high blood pressure  Continue a low-sodium diet  Check blood work in July  You can group blood work together

## 2023-05-11 RX ORDER — LISINOPRIL 2.5 MG/1
TABLET ORAL
COMMUNITY
Start: 2023-03-29

## 2023-05-11 RX ORDER — AMLODIPINE BESYLATE 5 MG/1
5 TABLET ORAL DAILY
COMMUNITY
Start: 2023-03-30 | End: 2024-03-29

## 2023-05-11 RX ORDER — FERROUS SULFATE 325(65) MG
325 TABLET ORAL
COMMUNITY
Start: 2023-03-02

## 2023-05-11 RX ORDER — RILONACEPT 160 MG/2ML
220 INJECTION, POWDER, LYOPHILIZED, FOR SOLUTION SUBCUTANEOUS
COMMUNITY

## 2023-05-15 ENCOUNTER — OFFICE VISIT (OUTPATIENT)
Dept: UROLOGY | Facility: CLINIC | Age: 63
End: 2023-05-15

## 2023-05-15 DIAGNOSIS — N20.0 STAGHORN CALCULUS: Primary | ICD-10-CM

## 2023-05-15 NOTE — PROGRESS NOTES
5/15/2023    Chief Complaint   Patient presents with   • Follow-up   • Nephrolithiasis       Assessment and Plan    66 y o  female manage by Dr Simran Eldridge    1  Nephrolithiasis  · S/p Left URS with lithotripsy on 12/21/2022 by Dr Simran Eldridge  Noted to have retained ureteral stent from prior lithotripsy as well during that procedure  This would subsequently be removed by IR on 1/30/2023 during Left PCN placement  Successful capping trial of Left PCN and removed by me on 2/09/2023  · Renal US 3/09/2023  · Reduced stone burden within left kidney  Largest measuring 7 mm in the lower pole  No hydronephrosis bilaterally  · As she remains asymptomatic and denies any issues with recurrent UTIs since her last ureteroscopy we will continue surveillance and I have placed orders for a CT renal stone study to be done in 6 months  I will then see her in follow-up to review those results  Subjective:    She presents today reporting doing well since last time I saw her  She recently saw nephrology and will be starting 24-hour urine collection study  They did not start her on any medications at this time and are awaiting results of her 24-hour urine  She remains asymptomatic and denies any issues with recurrent UTIs  History of Present Illness  Maya Layton is a 66 y o  female here for follow-up evaluation of left renal calculi  Patient of Dr Anu Hoover with history of renal calculi and had originally underwent a cystoscopy, left ureteroscopy and laser lithotripsy of a partial staghorn calculi by Dr Simran Eldridge in May 2021  Following surgery her ureteral stent came out on its own and since she did not present to the office it was unknown if the stent came out intact or not    It appeared that she had a retained stent fragment in the left kidney which was noted on CT scan from November 28, 2022-however, it also could simply be recurrence of stone        She then underwent a cystoscopy, left ureteroscopy with laser "lithotripsy by Dr Klaus Car on 12/21/2022 where she was found to have a large recurrence of stone about 2 cm which was broken up into tiny fragments  There was a proximal coil of the stent with wire inside for which Dr Klaus Car was unable to remove at that time  Plan was for her to undergo a left PCNL with Dr Klaus Car on 2/2/2023  Interventional radiology had placed a left PCN on 1/30/2023  At that time Dr Edelmira Sauer with interventional radiology was able to successfully extract the retained stent  The decision was made to cap her nephrostomy tube which was successfully removed by me on 2/09/2023  She would have a follow-up Renal US completed on 3/09/2023 to assess for any residual stone burden as well as to ensure to persistent hydronephrosis  This would show significantly reduced stone burden with multiple left renal calculi largest measuring 7 mm in the left lower pole  No hydronephrosis bilaterally  Review of Systems   Constitutional: Negative for chills and fever  HENT: Negative for ear pain and sore throat  Eyes: Negative for pain and visual disturbance  Respiratory: Negative for cough and shortness of breath  Cardiovascular: Negative for chest pain and palpitations  Gastrointestinal: Negative for abdominal pain, constipation, diarrhea, nausea and vomiting  Genitourinary: Negative for difficulty urinating, dysuria, flank pain, frequency, hematuria and urgency  Musculoskeletal: Negative for arthralgias and back pain  Skin: Negative for color change and rash  Neurological: Negative for seizures and syncope  All other systems reviewed and are negative  Vitals  Vitals:    05/15/23 1603   BP: (P) 118/86   BP Location: (P) Left arm   Patient Position: (P) Sitting   Cuff Size: (P) Adult   Pulse: (P) 94   Temp: (P) 98 °F (36 7 °C)   TempSrc: (P) Temporal   SpO2: (P) 97%   Weight: (P) 90 3 kg (199 lb)   Height: (P) 5' 6\" (1 676 m)       Physical Exam  Vitals reviewed   " Constitutional:       General: She is not in acute distress  Appearance: Normal appearance  She is normal weight  She is not ill-appearing or toxic-appearing  HENT:      Head: Normocephalic and atraumatic  Nose: Nose normal    Eyes:      General: No scleral icterus  Conjunctiva/sclera: Conjunctivae normal    Cardiovascular:      Rate and Rhythm: Normal rate  Pulses: Normal pulses  Pulmonary:      Effort: Pulmonary effort is normal  No respiratory distress  Abdominal:      General: Abdomen is flat  Palpations: Abdomen is soft  Tenderness: There is no abdominal tenderness  There is no right CVA tenderness or left CVA tenderness  Hernia: No hernia is present  Musculoskeletal:         General: Normal range of motion  Cervical back: Normal range of motion  Skin:     General: Skin is warm and dry  Neurological:      General: No focal deficit present  Mental Status: She is alert and oriented to person, place, and time  Mental status is at baseline  Psychiatric:         Mood and Affect: Mood normal          Behavior: Behavior normal          Thought Content: Thought content normal          Judgment: Judgment normal          Past History  Past Medical History:   Diagnosis Date   • Breast cancer ([facility] ) 1998    Pt had in left breast- had radiation and surgery   • COVID-19     pt had 2 times- 2020; 1/2022   • CPAP (continuous positive airway pressure) dependence     Pt uses nightly   • GERD (gastroesophageal reflux disease)    • H/O cervical fracture    • Hyperlipidemia    • Irregular heart beat     Pt is taking Zetia   Pt does not have happen anymore per pt since on medication   • Kidney stone    • Liver disease     fatty liver   • Pancreatitis    • Pericarditis    • Rheumatoid arthritis ([facility] )    • Seasonal allergies    • Sleep apnea    • Wears glasses      Social History     Socioeconomic History   • Marital status: /Civil Union     Spouse name: None   • Number of children: None   • Years of education: None   • Highest education level: None   Occupational History   • None   Tobacco Use   • Smoking status: Former     Types: Cigarettes     Quit date:      Years since quittin 3   • Smokeless tobacco: Never   Vaping Use   • Vaping Use: Never used   Substance and Sexual Activity   • Alcohol use: Yes     Comment: rarely   • Drug use: Not Currently   • Sexual activity: Not Currently     Comment: did not want   Other Topics Concern   • None   Social History Narrative   • None     Social Determinants of Health     Financial Resource Strain: Not on file   Food Insecurity: Not on file   Transportation Needs: Not on file   Physical Activity: Not on file   Stress: Not on file   Social Connections: Not on file   Intimate Partner Violence: Not on file   Housing Stability: Not on file     Social History     Tobacco Use   Smoking Status Former   • Types: Cigarettes   • Quit date:    • Years since quittin 3   Smokeless Tobacco Never     Family History   Problem Relation Age of Onset   • Lung cancer Father    • Heart disease Maternal Aunt    • Heart disease Maternal Uncle    • Kidney cancer Maternal Grandfather    • Lung cancer Maternal Grandfather    • Heart attack Brother        The following portions of the patient's history were reviewed and updated as appropriate allergies, current medications, past medical history, past social history, past surgical history and problem list    Imaging:     3/09/2023  RENAL ULTRASOUND     INDICATION:   N20 0: Calculus of kidney  Z96 0: Presence of urogenital implants  History of staghorn calculus; nephrostomy removed recently      COMPARISON: 12/3/2022 ultrasound; CT from 2022     TECHNIQUE:   Ultrasound of the retroperitoneum was performed with a curvilinear transducer utilizing volumetric sweeps and still imaging techniques       FINDINGS:     KIDNEYS:  Symmetric and normal size  Right kidney:  11 2 x 5 6 x 5 3 cm   Volume 175 2 mL  Left kidney:  10 2 x 6 6 x 4 7 cm  Volume 167 1 mL     Right kidney  Normal echogenicity and contour  No mass is identified  Simple cyst measures 2 cm at the upper pole  No hydronephrosis  No shadowing calculi  No perinephric fluid collections      Left kidney  Normal echogenicity and contour  No mass is identified  Some cortical thinning appears to be present involving the lower pole the left kidney near calculi  No hydronephrosis  4 mm shadowing calculus in the left mid pole as well as 4 mm and 7 mm calculi at the lower pole  No perinephric fluid collections      URETERS:  Nonvisualized      BLADDER:   Normally distended  No focal thickening or mass lesions  Bilateral ureteral jets detected            IMPRESSION:     Multiple nonobstructing calculi in the left kidney      No hydronephrosis              Results  No results found for this or any previous visit (from the past 1 hour(s))  ]  No results found for: PSA  Lab Results   Component Value Date    CALCIUM 9 6 03/27/2023    K 3 9 03/27/2023    CO2 28 03/27/2023     03/27/2023    BUN 14 03/27/2023    CREATININE 1 02 03/27/2023     Lab Results   Component Value Date    WBC 5 74 03/27/2023    HGB 12 7 03/27/2023    HCT 40 2 03/27/2023    MCV 87 03/27/2023     03/27/2023       Please Note:  Voice dictation software has been used to create this document  There may be inadvertent transcriptions errors       EDWARD Beebe 05/15/23

## 2023-05-25 ENCOUNTER — HOSPITAL ENCOUNTER (OUTPATIENT)
Dept: NON INVASIVE DIAGNOSTICS | Facility: HOSPITAL | Age: 63
Discharge: HOME | End: 2023-05-25
Attending: INTERNAL MEDICINE

## 2023-05-25 DIAGNOSIS — I10 ESSENTIAL HYPERTENSION: ICD-10-CM

## 2023-06-22 ENCOUNTER — TELEPHONE (OUTPATIENT)
Dept: NEPHROLOGY | Facility: CLINIC | Age: 63
End: 2023-06-22

## 2023-08-09 ENCOUNTER — OFFICE VISIT (OUTPATIENT)
Dept: NEPHROLOGY | Facility: CLINIC | Age: 63
End: 2023-08-09

## 2023-08-09 VITALS
BODY MASS INDEX: 33.12 KG/M2 | HEART RATE: 72 BPM | DIASTOLIC BLOOD PRESSURE: 78 MMHG | SYSTOLIC BLOOD PRESSURE: 132 MMHG | OXYGEN SATURATION: 97 % | HEIGHT: 64 IN | WEIGHT: 194 LBS

## 2023-08-09 DIAGNOSIS — Z86.39 HISTORY OF HYPERLIPIDEMIA: ICD-10-CM

## 2023-08-09 DIAGNOSIS — N13.30 HYDRONEPHROSIS OF LEFT KIDNEY: ICD-10-CM

## 2023-08-09 DIAGNOSIS — M05.722 RHEUMATOID ARTHRITIS INVOLVING LEFT ELBOW WITH POSITIVE RHEUMATOID FACTOR (HCC): ICD-10-CM

## 2023-08-09 DIAGNOSIS — N20.0 NEPHROLITHIASIS: Primary | ICD-10-CM

## 2023-08-09 RX ORDER — HYDROCHLOROTHIAZIDE 25 MG/1
25 TABLET ORAL DAILY
Qty: 90 TABLET | Refills: 3 | Status: SHIPPED | OUTPATIENT
Start: 2023-08-09

## 2023-08-09 RX ORDER — POTASSIUM CITRATE 10 MEQ/1
20 TABLET, EXTENDED RELEASE ORAL 2 TIMES DAILY WITH MEALS
Qty: 180 TABLET | Refills: 3 | Status: SHIPPED | OUTPATIENT
Start: 2023-08-09

## 2023-08-09 NOTE — PATIENT INSTRUCTIONS
2-3 quarts of lemon water  Potassium citrate twice a day with food  HCTZ 25mg in the morning to keep calcium in your blood rather than the urine

## 2023-08-09 NOTE — ASSESSMENT & PLAN NOTE
She has a long history of calcium containing stones and has a stone burden  Last stone analysis was magnesium ammonium phosphate and 80% calcium hydroxyapatite  Stone analysis discussed. She has relative hypercalciuria. Will check a PTH and ionized calcium. Start  hydrochlorothiazide 25mg daily  She has relatively low-med urinary citrate. Will start potassium citrate 20 meq twice a day with food to help buffer  Thyroids were fine.  A workup for sarcoidosis was not done but unlikely in this scenario  Will continue to drink  2-3 liters of water a day with lemon or an acidifying agent  She avoids salt in the diet  Would repeat CT in 6 months to evaluate stone size

## 2023-08-09 NOTE — PROGRESS NOTES
[facility], [place]    Name: Yefri Arroyo  YOB: 1960      Assessment/Plan:           Problem List Items Addressed This Visit        Musculoskeletal and Integument    Rheumatoid arthritis ([address])     Takes leflunomide and is in remission            Genitourinary    Hydronephrosis of left kidney     Resolved         Relevant Medications    hydrochlorothiazide (HYDRODIURIL) 25 mg tablet    Nephrolithiasis - Primary     She has a long history of calcium containing stones and has a stone burden  Last stone analysis was magnesium ammonium phosphate and 80% calcium hydroxyapatite  Stone analysis discussed. She has relative hypercalciuria. Will check a PTH and ionized calcium. Start  hydrochlorothiazide 25mg daily  She has relatively low-med urinary citrate. Will start potassium citrate 20 meq twice a day with food to help buffer  Thyroids were fine. A workup for sarcoidosis was not done but unlikely in this scenario  Will continue to drink  2-3 liters of water a day with lemon or an acidifying agent  She avoids salt in the diet  Would repeat CT in 6 months to evaluate stone size         Relevant Medications    potassium citrate (Urocit-K 10) 10 mEq    hydrochlorothiazide (HYDRODIURIL) 25 mg tablet       Other    History of hyperlipidemia     Under PCP supervision              Subjective:      Patient ID: Yefri Arroyo is a 66 y.o. female. Referred by Dr Robles Ran a history of staghorn calculi with stone composition of 80% calcium and magnesium ammonium phosphate, rheumatoid arthritis and hypertension  She had a stone risk profile. She has had frequent stone attacks. In the last 3 years the stone attacks have increased in frequency. The 3 stones are still present int he left kidney and now has a stone on the right.  F  Ffollowing portions of the patient's history were reviewed and updated as appropriate: allergies, current medications, past family history, past medical history, past social history, past surgical history and problem list.    Review of Systems   Constitutional: Negative for fatigue. HENT: Negative for hearing loss. Cardiovascular: Negative for chest pain, palpitations and leg swelling. Gastrointestinal: Negative for abdominal pain, blood in stool and constipation. Genitourinary: Negative for difficulty urinating, dysuria, hematuria and urgency. Musculoskeletal: Positive for arthralgias. Neurological: Negative for dizziness. Psychiatric/Behavioral: Positive for dysphoric mood. Social History     Socioeconomic History   • Marital status: /Civil Union     Spouse name: None   • Number of children: None   • Years of education: None   • Highest education level: None   Occupational History   • None   Tobacco Use   • Smoking status: Former     Types: Cigarettes     Quit date:      Years since quittin.6   • Smokeless tobacco: Never   Vaping Use   • Vaping Use: Never used   Substance and Sexual Activity   • Alcohol use: Yes     Comment: rarely   • Drug use: Not Currently   • Sexual activity: Not Currently     Comment: did not want   Other Topics Concern   • None   Social History Narrative   • None     Social Determinants of Health     Financial Resource Strain: Not on file   Food Insecurity: No Food Insecurity (2022)    Hunger Vital Sign    • Worried About Running Out of Food in the Last Year: Never true    • Ran Out of Food in the Last Year: Never true   Transportation Needs: No Transportation Needs (2022)    PRAPARE - Transportation    • Lack of Transportation (Medical): No    • Lack of Transportation (Non-Medical):  No   Physical Activity: Not on file   Stress: Not on file   Social Connections: Not on file   Intimate Partner Violence: Not on file   Housing Stability: Low Risk  (2022)    Housing Stability Vital Sign    • Unable to Pay for Housing in the Last Year: No    • Number of Places Lived in the Last Year: 1    • Unstable Housing in the Last Year: No     Past Medical History:   Diagnosis Date   • Breast cancer ([address])     Pt had in left breast- had radiation and surgery   • COVID-19     pt had 2 times- ; 2022   • CPAP (continuous positive airway pressure) dependence     Pt uses nightly   • GERD (gastroesophageal reflux disease)    • H/O cervical fracture    • Hyperlipidemia    • Irregular heart beat     Pt is taking Zetia.  Pt does not have happen anymore per pt since on medication   • Kidney stone    • Liver disease     fatty liver   • Pancreatitis    • Pericarditis    • Rheumatoid arthritis ([address])    • Seasonal allergies    • Sleep apnea    • Wears glasses      Past Surgical History:   Procedure Laterality Date   • BLADDER SUSPENSION     • BREAST LUMPECTOMY     •  SECTION      x2   • COLONOSCOPY     • EGD     • FL RETROGRADE PYELOGRAM  2021   • FOOT SURGERY Right    • HYSTERECTOMY     • IR NEPHROURETERAL ACCESS FOR UROLOGY PCNL  2023   • IR NEPHROURETERAL STENT PLACEMENT Left    • LA CYSTO BLADDER W/URETERAL CATHETERIZATION Left 2021    Procedure: CYSTOSCOPY RETROGRADE PYELOGRAM WITH INSERTION STENT URETERAL;  Surgeon: Geeta Childs MD;  Location: [facility];  Service: Urology   • LA CYSTO/URETERO W/LITHOTRIPSY &INDWELL STENT INSRT Left 2021    Procedure: CYSTOSCOPY URETEROSCOPY WITH LITHOTRIPSY HOLMIUM LASER,  AND INSERTION STENT URETERAL;  Surgeon: Roopa Renee MD;  Location: [facility];  Service: Urology   • LA CYSTO/URETERO W/LITHOTRIPSY &INDWELL STENT INSRT Left 2022    Procedure: CYSTOSCOPY URETEROSCOPY WITH LITHOTRIPSY HOLMIUM LASER,  INSERTION STENT URETERAL, attempted removal of foreign body;  Surgeon: Roopa Renee MD;  Location: [facility];  Service: Urology   • SINUS SURGERY      Deviated septum       Current Outpatient Medications:   •  amLODIPine (NORVASC) 5 mg tablet, Take 5 mg by mouth daily, Disp: , Rfl:   •  Ascorbic Acid (Vitamin C) 500 MG CAPS, Take 1 capsule by mouth in the morning, Disp: , Rfl:   •  atorvastatin (LIPITOR) 40 mg tablet, Take 40 mg by mouth, Disp: , Rfl:   •  Cholecalciferol 50 MCG (2000 UT) CAPS, Take 1 capsule by mouth daily 1000 units daily, Disp: , Rfl:   •  cyanocobalamin (VITAMIN B-12) 1000 MCG tablet, Take 1,000 mcg by mouth daily, Disp: , Rfl:   •  Ergocalciferol 50 MCG (2000 UT) CAPS, Take by mouth, Disp: , Rfl:   •  fluticasone (FLONASE) 50 mcg/act nasal spray, 1 spray into each nostril as needed , Disp: , Rfl:   •  hydrochlorothiazide (HYDRODIURIL) 25 mg tablet, Take 1 tablet (25 mg total) by mouth daily, Disp: 90 tablet, Rfl: 3  •  leflunomide (ARAVA) 20 MG tablet, Take 20 mg by mouth daily, Disp: , Rfl:   •  montelukast (SINGULAIR) 10 mg tablet, Take 10 mg by mouth daily, Disp: , Rfl:   •  Multiple Vitamins-Minerals (Multivitamin Gummies Womens) CHEW, Col[place] Di [place]onica 2 each daily, Disp: , Rfl:   •  pantoprazole (PROTONIX) 40 mg tablet, Take 40 mg by mouth daily, Disp: , Rfl:   •  potassium citrate (Urocit-K 10) 10 mEq, Take 2 tablets (20 mEq total) by mouth 2 (two) times a day with meals, Disp: 180 tablet, Rfl: 3  •  amLODIPine (NORVASC) 5 mg tablet, Take 5 mg by mouth 2 (two) times a day, Disp: , Rfl:   •  ezetimibe (ZETIA) 10 mg tablet, Take 10 mg by mouth daily , Disp: , Rfl:   •  ferrous sulfate 325 (65 Fe) mg tablet, Take 325 mg by mouth (Patient not taking: Reported on 5/15/2023), Disp: , Rfl:   •  lisinopril (ZESTRIL) 2.5 mg tablet, , Disp: , Rfl:   •  Magnesium 100 MG CAPS, Take by mouth daily  (Patient not taking: Reported on 1/10/2023), Disp: , Rfl:   •  meclizine (ANTIVERT) 25 mg tablet, Take 1 tablet (25 mg total) by mouth 3 (three) times a day as needed for dizziness (Patient not taking: Reported on 5/2/2023), Disp: 30 tablet, Rfl: 0  •  Rilonacept (Arcalyst) 220 MG SOLR, Inject 220 mcg under the skin (Patient not taking: Reported on 5/15/2023), Disp: , Rfl:   •  sodium chloride, PF, 0.9 %, 10 mL by Intracatheter route daily Intracatheter flushing daily. May substitute prefilled syringe with normal saline 10 mL vials, 10 mL syringes, and 18 g blunt needles, Disp: 900 mL, Rfl: 1    Lab Results   Component Value Date    SODIUM 139 03/27/2023    K 3.9 03/27/2023     03/27/2023    CO2 28 03/27/2023    AGAP 6 03/27/2023    BUN 14 03/27/2023    CREATININE 1.02 03/27/2023    GLUC 88 03/27/2023    CALCIUM 9.6 03/27/2023    AST 24 03/27/2023    ALT 29 03/27/2023    ALKPHOS 115 (H) 03/27/2023    TP 7.4 03/27/2023    TBILI 0.28 03/27/2023    EGFR 59 03/27/2023     Lab Results   Component Value Date    WBC 5.74 03/27/2023    HGB 12.7 03/27/2023    HCT 40.2 03/27/2023    MCV 87 03/27/2023     03/27/2023     Lab Results   Component Value Date    CHOLESTEROL 265 (H) 02/21/2022     Lab Results   Component Value Date    HDL 67 02/21/2022     Lab Results   Component Value Date    LDLCALC 149 (H) 02/21/2022     Lab Results   Component Value Date    TRIG 244 (H) 02/21/2022     No results found for: "CHOLHDL"  Lab Results   Component Value Date    HQX7LBESTJSR 4.126 03/27/2023     Lab Results   Component Value Date    CALCIUM 9.6 03/27/2023     No results found for: "SPEP", "UPEP"  No results found for: "Anisha Crease", "[id number]"        Objective:      /78 (BP Location: Left arm, Patient Position: Sitting, Cuff Size: Standard)   Pulse 72   Ht 5' 4" (1.626 m)   Wt 88 kg (194 lb)   SpO2 97%   BMI 33.30 kg/m²          Physical Exam  Vitals reviewed. Constitutional:       General: She is not in acute distress. Appearance: She is normal weight. She is not toxic-appearing. HENT:      Head: Normocephalic and atraumatic. Right Ear: External ear normal.      Left Ear: External ear normal.   Eyes:      Extraocular Movements: Extraocular movements intact. Cardiovascular:      Rate and Rhythm: Normal rate and regular rhythm.    Pulmonary:      Effort: Pulmonary effort is normal.      Breath sounds: Normal breath sounds. Abdominal:      General: Bowel sounds are normal.      Tenderness: There is no abdominal tenderness. Musculoskeletal:      Cervical back: Normal range of motion. Right lower leg: No edema. Left lower leg: No edema. Lymphadenopathy:      Cervical: No cervical adenopathy. Skin:     General: Skin is warm and dry. Neurological:      General: No focal deficit present. Mental Status: She is alert. Psychiatric:         Mood and Affect: Mood normal.         Behavior: Behavior normal.         Thought Content:  Thought content normal.         Judgment: Judgment normal.

## 2023-08-10 ENCOUNTER — DOCTOR'S OFFICE (OUTPATIENT)
Dept: URBAN - NONMETROPOLITAN AREA CLINIC 1 | Facility: CLINIC | Age: 63
Setting detail: OPHTHALMOLOGY
End: 2023-08-10
Payer: COMMERCIAL

## 2023-08-10 DIAGNOSIS — H16.143: ICD-10-CM

## 2023-08-10 DIAGNOSIS — B30.9: ICD-10-CM

## 2023-08-10 PROCEDURE — 92012 INTRM OPH EXAM EST PATIENT: CPT

## 2023-08-10 ASSESSMENT — REFRACTION_CURRENTRX
OD_ADD: +2.25
OS_OVR_VA: 20/
OD_CYLINDER: -1.75
OS_CYLINDER: -1.50
OD_VPRISM_DIRECTION: PROGS
OD_SPHERE: +0.75
OD_AXIS: 095
OS_AXIS: 076
OD_OVR_VA: 20/
OS_ADD: +2.25
OS_SPHERE: +0.50
OS_VPRISM_DIRECTION: PROGS

## 2023-08-10 ASSESSMENT — SPHEQUIV_DERIVED
OD_SPHEQUIV: 0.25
OS_SPHEQUIV: 0.375
OD_SPHEQUIV: 0.25
OS_SPHEQUIV: 0.25

## 2023-08-10 ASSESSMENT — REFRACTION_MANIFEST
OD_ADD: +2.50
OS_CYLINDER: -1.50
OS_ADD: +2.50
OD_VA2: 20/20
OS_AXIS: 075
OD_CYLINDER: -1.50
OD_AXIS: 095
OS_VA2: 20/20
OD_VA1: 20/20
OS_SPHERE: +1.00
OS_VA1: 20/20
OD_SPHERE: +1.00

## 2023-08-10 ASSESSMENT — REFRACTION_AUTOREFRACTION
OD_SPHERE: +1.00
OD_AXIS: 083
OS_CYLINDER: -1.75
OS_AXIS: 068
OS_SPHERE: +1.25
OD_CYLINDER: -1.50

## 2023-08-10 ASSESSMENT — SUPERFICIAL PUNCTATE KERATITIS (SPK): OD_SPK: 2+

## 2023-08-10 ASSESSMENT — VISUAL ACUITY
OD_BCVA: 20/20
OS_BCVA: 20/30

## 2023-08-10 ASSESSMENT — CONFRONTATIONAL VISUAL FIELD TEST (CVF)
OD_FINDINGS: FULL
OS_FINDINGS: FULL

## 2023-08-22 ENCOUNTER — DOCTOR'S OFFICE (OUTPATIENT)
Dept: URBAN - NONMETROPOLITAN AREA CLINIC 1 | Facility: CLINIC | Age: 63
Setting detail: OPHTHALMOLOGY
End: 2023-08-22
Payer: COMMERCIAL

## 2023-08-22 ENCOUNTER — RX ONLY (RX ONLY)
Age: 63
End: 2023-08-22

## 2023-08-22 DIAGNOSIS — H16.143: ICD-10-CM

## 2023-08-22 DIAGNOSIS — H10.13: ICD-10-CM

## 2023-08-22 PROBLEM — B30.9 VIRAL CONJUNCTIVITIS, UNSPECIFIED: Status: RESOLVED | Noted: 2023-08-10 | Resolved: 2023-08-22

## 2023-08-22 PROBLEM — H25.13 CATARACT NUCLEAR SCLEROSIS; BOTH EYES: Status: ACTIVE | Noted: 2023-08-10

## 2023-08-22 PROCEDURE — 92012 INTRM OPH EXAM EST PATIENT: CPT

## 2023-08-22 ASSESSMENT — SPHEQUIV_DERIVED
OD_SPHEQUIV: 0.25
OS_SPHEQUIV: 0.375
OD_SPHEQUIV: 0.25
OS_SPHEQUIV: 0.25

## 2023-08-22 ASSESSMENT — VISUAL ACUITY
OD_BCVA: 20/20-2
OS_BCVA: 20/20-2

## 2023-08-22 ASSESSMENT — REFRACTION_CURRENTRX
OD_ADD: +2.25
OD_SPHERE: +0.75
OS_CYLINDER: -1.50
OD_AXIS: 095
OD_VPRISM_DIRECTION: PROGS
OD_CYLINDER: -1.75
OS_VPRISM_DIRECTION: PROGS
OS_AXIS: 076
OS_ADD: +2.25
OS_SPHERE: +0.50
OD_OVR_VA: 20/
OS_OVR_VA: 20/

## 2023-08-22 ASSESSMENT — REFRACTION_MANIFEST
OS_VA2: 20/20
OD_VA2: 20/20
OS_AXIS: 075
OD_VA1: 20/20
OS_SPHERE: +1.00
OD_ADD: +2.50
OS_ADD: +2.50
OS_VA1: 20/20
OD_AXIS: 095
OS_CYLINDER: -1.50
OD_CYLINDER: -1.50
OD_SPHERE: +1.00

## 2023-08-22 ASSESSMENT — REFRACTION_AUTOREFRACTION
OD_CYLINDER: -1.50
OS_CYLINDER: -1.75
OS_SPHERE: +1.25
OS_AXIS: 068
OD_SPHERE: +1.00
OD_AXIS: 083

## 2023-08-22 ASSESSMENT — TONOMETRY
OD_IOP_MMHG: 16
OS_IOP_MMHG: 16

## 2023-08-22 ASSESSMENT — SUPERFICIAL PUNCTATE KERATITIS (SPK)
OD_SPK: T
OS_SPK: T

## 2023-11-15 ENCOUNTER — HOSPITAL ENCOUNTER (OUTPATIENT)
Dept: CT IMAGING | Facility: HOSPITAL | Age: 63
Discharge: HOME | End: 2023-11-15
Payer: COMMERCIAL

## 2023-11-15 DIAGNOSIS — N20.0 STAGHORN CALCULUS: ICD-10-CM

## 2023-11-15 PROCEDURE — 74176 CT ABD & PELVIS W/O CONTRAST: CPT

## 2023-11-15 PROCEDURE — G1004 CDSM NDSC: HCPCS

## 2023-11-20 ENCOUNTER — TELEPHONE (OUTPATIENT)
Age: 63
End: 2023-11-20

## 2023-11-20 NOTE — TELEPHONE ENCOUNTER
Contacted patient and reviewed CT scan results and scheduled her for follow up appointment with Day Acuna for further discussion. Date, time, and location confirmed.

## 2023-11-20 NOTE — TELEPHONE ENCOUNTER
Patient seen by Stephie Forte at [facility]    Patient called stating she had ct scan done and would like to know the results and see if she needs a follow up appointment.     Patient can be reached at [phone]

## 2023-11-29 ENCOUNTER — OFFICE VISIT (OUTPATIENT)
Dept: UROLOGY | Facility: CLINIC | Age: 63
End: 2023-11-29
Payer: COMMERCIAL

## 2023-11-29 VITALS
WEIGHT: 194 LBS | SYSTOLIC BLOOD PRESSURE: 108 MMHG | HEIGHT: 64 IN | HEART RATE: 78 BPM | BODY MASS INDEX: 33.12 KG/M2 | OXYGEN SATURATION: 98 % | DIASTOLIC BLOOD PRESSURE: 72 MMHG

## 2023-11-29 DIAGNOSIS — N20.0 NEPHROLITHIASIS: Primary | ICD-10-CM

## 2023-11-29 DIAGNOSIS — Z01.818 ENCOUNTER FOR PREADMISSION TESTING: ICD-10-CM

## 2023-11-29 DIAGNOSIS — N20.0 STAGHORN CALCULUS: ICD-10-CM

## 2023-11-29 LAB
SL AMB  POCT GLUCOSE, UA: NEGATIVE
SL AMB LEUKOCYTE ESTERASE,UA: NORMAL
SL AMB POCT BILIRUBIN,UA: NEGATIVE
SL AMB POCT BLOOD,UA: NORMAL
SL AMB POCT CLARITY,UA: CLEAR
SL AMB POCT COLOR,UA: YELLOW
SL AMB POCT KETONES,UA: NEGATIVE
SL AMB POCT NITRITE,UA: NEGATIVE
SL AMB POCT PH,UA: 6
SL AMB POCT SPECIFIC GRAVITY,UA: 1.02
SL AMB POCT URINE PROTEIN: NORMAL
SL AMB POCT UROBILINOGEN: 0.2

## 2023-11-29 PROCEDURE — 99213 OFFICE O/P EST LOW 20 MIN: CPT

## 2023-11-29 PROCEDURE — 87186 SC STD MICRODIL/AGAR DIL: CPT

## 2023-11-29 PROCEDURE — 87077 CULTURE AEROBIC IDENTIFY: CPT

## 2023-11-29 PROCEDURE — 87086 URINE CULTURE/COLONY COUNT: CPT

## 2023-11-29 PROCEDURE — 81002 URINALYSIS NONAUTO W/O SCOPE: CPT

## 2023-11-29 RX ORDER — ROSUVASTATIN CALCIUM 5 MG/1
5 TABLET, COATED ORAL DAILY
COMMUNITY
Start: 2023-09-15 | End: 2024-09-14

## 2023-11-29 NOTE — PROGRESS NOTES
11/29/2023    Chief Complaint   Patient presents with    Follow-up     Review CT Scan. Assessment and Plan    66 y.o. female manage by Dr. Eddie Barragan    1. Nephrolithiasis  S/p Left URS with lithotripsy on 12/21/2022 by Dr. Eddie Barragan. Noted to have retained ureteral stent from prior lithotripsy as well during that procedure. This would subsequently be removed by IR on 1/30/2023 during Left PCN placement. Successful capping trial of Left PCN and removed by me on 2/09/2023. CT renal stone study (11/15/2023)  Bilateral intrarenal calculi more numerous on the left. Although some improvement in stone burden in the left kidney there is however enlarging staghorn type calculus at the lower pole measuring up to 2.1 cm previously 1.6 cm  Due to increased size of current stone burden and the fact that she feels she has been experiencing more recurrent urinary tract infections over the past several months I am recommending repeat cystoscopy, left ureteroscopy with laser lithotripsy, left retrograde pyelogram, and left ureteral stent placement. Technique, benefits, and risk of the procedure listed above were discussed with them today and informed written consent was obtained. All questions and concerns regarding surgery and perioperative expectations have been addressed and answered. No overall changes in their health since last visit. Denies any prior complications with anesthesia. She is concern for possible UTI today, we will send her urine for formal culture and call her with those results. Subjective:    Resents today reporting overall doing well. She does feel like she has been experiencing more recurrent UTIs and is concerned for possible infection today. Last positive urine culture was Proteus in July. Urine culture from October showed mixed contaminant growth. She denies any flank pain or gross hematuria.         History of Present Illness  Neva Frausto is a 66 y.o. female here for follow-up evaluation of nephrolithiasis. Patient of Dr. Nehal Steiner with history of renal calculi and had originally underwent a cystoscopy, left ureteroscopy and laser lithotripsy of a partial staghorn calculi by Dr. Kamla Chavez in May 2021. Following surgery her ureteral stent came out on its own and since she did not present to the office it was unknown if the stent came out intact or not. It appeared that she had a retained stent fragment in the left kidney which was noted on CT scan from November 28, 2022-however, it also could simply be recurrence of stone. She then underwent a cystoscopy, left ureteroscopy with laser lithotripsy by Dr. Kamla Chavez on 12/21/2022 where she was found to have a large recurrence of stone about 2 cm which was broken up into tiny fragments. There was a proximal coil of the stent with wire inside for which Dr. Kamla Chavez was unable to remove at that time. Plan was for her to undergo a left PCNL with Dr. Kamla Chavez on 2/2/2023. Interventional radiology had placed a left PCN on 1/30/2023. At that time Dr. Alyssia Berumen with interventional radiology was able to successfully extract the retained stent. The decision was made to cap her nephrostomy tube which was successfully removed by me on 2/09/2023. She would have a follow-up Renal US completed on 3/09/2023 to assess for any residual stone burden as well as to ensure to persistent hydronephrosis. This would show significantly reduced stone burden with multiple left renal calculi largest measuring 7 mm in the left lower pole. No hydronephrosis bilaterally. Most recent CT renal stone study from 11/15/2023 shows interval removal of left ureteral stent since prior CT. No hydronephrosis. Bilateral intrarenal calculi more numerous on the left. Some improvement in stone burden at the left kidney upper to mid pole. There is however enlarging staghorn type calculus at the lower pole measuring up to 2.1 cm previously 1.6 cm.           Review of Systems Constitutional:  Negative for chills and fever. HENT:  Negative for ear pain and sore throat. Eyes:  Negative for pain and visual disturbance. Respiratory:  Negative for cough and shortness of breath. Cardiovascular:  Negative for chest pain and palpitations. Gastrointestinal:  Negative for abdominal pain and vomiting. Genitourinary:  Positive for dysuria and frequency. Negative for hematuria. Musculoskeletal:  Negative for arthralgias and back pain. Skin:  Negative for color change and rash. Neurological:  Negative for seizures and syncope. All other systems reviewed and are negative. Vitals  Vitals:    11/29/23 1312   BP: 108/72   Pulse: 78   SpO2: 98%   Weight: 88 kg (194 lb)   Height: 5' 4" (1.626 m)       Physical Exam  Vitals reviewed. Constitutional:       General: She is not in acute distress. Appearance: Normal appearance. She is normal weight. She is not ill-appearing or toxic-appearing. HENT:      Head: Normocephalic and atraumatic. Nose: Nose normal.   Eyes:      General: No scleral icterus. Conjunctiva/sclera: Conjunctivae normal.   Cardiovascular:      Rate and Rhythm: Normal rate. Pulses: Normal pulses. Pulmonary:      Effort: Pulmonary effort is normal. No respiratory distress. Abdominal:      General: Abdomen is flat. Palpations: Abdomen is soft. Tenderness: There is no abdominal tenderness. There is no right CVA tenderness or left CVA tenderness. Hernia: No hernia is present. Musculoskeletal:         General: Normal range of motion. Cervical back: Normal range of motion. Skin:     General: Skin is warm and dry. Neurological:      General: No focal deficit present. Mental Status: She is alert and oriented to person, place, and time. Mental status is at baseline. Psychiatric:         Mood and Affect: Mood normal.         Behavior: Behavior normal.         Thought Content:  Thought content normal. Judgment: Judgment normal.         Past History  Past Medical History:   Diagnosis Date    Breast cancer ([address])     Pt had in left breast- had radiation and surgery    COVID-19     pt had 2 times- ; 2022    CPAP (continuous positive airway pressure) dependence     Pt uses nightly    GERD (gastroesophageal reflux disease)     H/O cervical fracture     Hyperlipidemia     Irregular heart beat     Pt is taking Zetia. Pt does not have happen anymore per pt since on medication    Kidney stone     Liver disease     fatty liver    Pancreatitis     Pericarditis     Rheumatoid arthritis ([address])     Seasonal allergies     Sleep apnea     Wears glasses      Social History     Socioeconomic History    Marital status: /Civil Union     Spouse name: None    Number of children: None    Years of education: None    Highest education level: None   Occupational History    None   Tobacco Use    Smoking status: Former     Types: Cigarettes     Quit date:      Years since quittin.9    Smokeless tobacco: Never   Vaping Use    Vaping Use: Never used   Substance and Sexual Activity    Alcohol use: Yes     Comment: rarely    Drug use: Not Currently    Sexual activity: Not Currently     Comment: did not want   Other Topics Concern    None   Social History Narrative    None     Social Determinants of Health     Financial Resource Strain: Not on file   Food Insecurity: No Food Insecurity (2022)    Hunger Vital Sign     Worried About Running Out of Food in the Last Year: Never true     Ran Out of Food in the Last Year: Never true   Transportation Needs: No Transportation Needs (2022)    PRAPARE - Transportation     Lack of Transportation (Medical): No     Lack of Transportation (Non-Medical):  No   Physical Activity: Not on file   Stress: Not on file   Social Connections: Not on file   Intimate Partner Violence: Not on file   Housing Stability: Low Risk  (2022)    Housing Stability Vital Sign     Unable to Pay for Housing in the Last Year: No     Number of Places Lived in the Last Year: 1     Unstable Housing in the Last Year: No     Social History     Tobacco Use   Smoking Status Former    Types: Cigarettes    Quit date:     Years since quittin.9   Smokeless Tobacco Never     Family History   Problem Relation Age of Onset    Lung cancer Father     Heart disease Maternal Aunt     Heart disease Maternal Uncle     Kidney cancer Maternal Grandfather     Lung cancer Maternal Grandfather     Heart attack Brother        The following portions of the patient's history were reviewed and updated as appropriate allergies, current medications, past medical history, past social history, past surgical history and problem list    Imagin/15/2023  CT ABDOMEN AND PELVIS WITHOUT IV CONTRAST - LOW DOSE RENAL STONE     INDICATION:   N20.0: Calculus of kidney. Left-sided nephrolithiasis. COMPARISON: CT abdomen pelvis 2022. TECHNIQUE:  Low radiation dose thin section CT examination of the abdomen and pelvis was performed without intravenous or oral contrast according to a protocol specifically designed to evaluate for urinary tract calculus. Axial, sagittal, and coronal 2D   reformatted images were created from the source data and submitted for interpretation. Evaluation for pathology in the abdomen and pelvis that is unrelated to urinary tract calculi is limited. Radiation dose length product (DLP) for this visit:  453 mGy-cm . This examination, like all CT scans performed in the [facility], was performed utilizing techniques to minimize radiation dose exposure, including the use of iterative   reconstruction and automated exposure control. URINARY TRACT FINDINGS:     RIGHT KIDNEY AND URETER: There are nonobstructing intrarenal calculi measuring on the order of 3 mm at the lower pole, stable. No hydronephrosis or hydroureter. Small cyst at the upper pole, stable.      LEFT KIDNEY AND URETER: Interval removal of the left ureteral stent. Some improvement in the stone burden at the upper to midpole. However enlarging staghorn type calculus at the lower pole measuring up to 2.1 cm on the sagittal reformats, previously 1.6   cm. Additional larger calculus at the left lower pole laterally measures up to 8 mm, previously 5 mm. No hydronephrosis or hydroureter. Cortical thinning at the mid to lower pole. URINARY BLADDER: Collapsed limiting evaluation. No calculi. ADDITIONAL FINDINGS:     LOWER CHEST:  No clinically significant abnormality identified in the visualized lower chest.     SOLID VISCERA: Limited low radiation dose noncontrast CT evaluation demonstrates no clinically significant abnormality of the imaged portions of the liver, spleen, pancreas, or adrenal glands. GALLBLADDER/BILIARY TREE:  No calcified gallstones. No pericholecystic inflammatory change. No biliary dilatation. STOMACH AND BOWEL:  There is colonic diverticulosis without evidence of acute diverticulitis. APPENDIX:  No findings to suggest appendicitis. ABDOMINOPELVIC CAVITY:  No ascites. No pneumoperitoneum. No lymphadenopathy. VESSELS: Mildly ectatic abdominal aorta. REPRODUCTIVE ORGANS:  Surgical changes of prior hysterectomy. ABDOMINAL WALL/INGUINAL REGIONS:  Unremarkable. OSSEOUS STRUCTURES:  No acute fracture or destructive osseous lesion. IMPRESSION:     1. Interval removal of the left ureteral stent since the prior CT. No hydronephrosis. Bilateral intrarenal calculi more numerous on the left. Some improvement in stone burden at the left kidney upper to mid pole but more prominent calculi at the left   lower pole. Results  No results found for this or any previous visit (from the past 1 hour(s)). ]  No results found for: "PSA"  Lab Results   Component Value Date    CALCIUM 9.6 03/27/2023    K 3.9 03/27/2023    CO2 28 03/27/2023     03/27/2023    BUN 14 03/27/2023    CREATININE 1.02 03/27/2023     Lab Results   Component Value Date    WBC 5.74 03/27/2023    HGB 12.7 03/27/2023    HCT 40.2 03/27/2023    MCV 87 03/27/2023     03/27/2023       Please Note:  Voice dictation software has been used to create this document. There may be inadvertent transcriptions errors.      EDWARD Hays 11/29/23

## 2023-12-01 ENCOUNTER — TELEPHONE (OUTPATIENT)
Dept: UROLOGY | Facility: CLINIC | Age: 63
End: 2023-12-01

## 2023-12-01 DIAGNOSIS — N30.00 ACUTE CYSTITIS WITHOUT HEMATURIA: Primary | ICD-10-CM

## 2023-12-01 LAB
BACTERIA UR CULT: ABNORMAL
BACTERIA UR CULT: ABNORMAL

## 2023-12-01 RX ORDER — CEPHALEXIN 500 MG/1
500 CAPSULE ORAL EVERY 6 HOURS SCHEDULED
Qty: 28 CAPSULE | Refills: 0 | Status: SHIPPED | OUTPATIENT
Start: 2023-12-01 | End: 2023-12-08

## 2023-12-01 NOTE — TELEPHONE ENCOUNTER
----- Message from [address] sent at 12/1/2023  3:19 PM EST -----  I have sent prescription for Keflex to patients pharmacy. Urine culture is positive for low count of proteus. Given stone disease and symptoms I would like to treat this.

## 2023-12-01 NOTE — RESULT ENCOUNTER NOTE
I have sent prescription for Keflex to patients pharmacy. Urine culture is positive for low count of proteus. Given stone disease and symptoms I would like to treat this.

## 2023-12-05 ENCOUNTER — TELEPHONE (OUTPATIENT)
Dept: UROLOGY | Facility: CLINIC | Age: 63
End: 2023-12-05

## 2023-12-05 NOTE — TELEPHONE ENCOUNTER
G[place] notifying abx sent to pharmacy per EDWARD.  Also notified I will be c/b to schedule her stone removal

## 2023-12-06 ENCOUNTER — TELEPHONE (OUTPATIENT)
Dept: NEPHROLOGY | Facility: CLINIC | Age: 63
End: 2023-12-06

## 2023-12-12 ENCOUNTER — OFFICE VISIT (OUTPATIENT)
Dept: NEPHROLOGY | Facility: CLINIC | Age: 63
End: 2023-12-12
Payer: COMMERCIAL

## 2023-12-12 VITALS
DIASTOLIC BLOOD PRESSURE: 64 MMHG | BODY MASS INDEX: 32.44 KG/M2 | WEIGHT: 189 LBS | OXYGEN SATURATION: 96 % | HEART RATE: 87 BPM | SYSTOLIC BLOOD PRESSURE: 126 MMHG

## 2023-12-12 DIAGNOSIS — E87.3 METABOLIC ALKALOSIS: ICD-10-CM

## 2023-12-12 DIAGNOSIS — E87.6 HYPOKALEMIA: ICD-10-CM

## 2023-12-12 DIAGNOSIS — I10 PRIMARY HYPERTENSION: ICD-10-CM

## 2023-12-12 DIAGNOSIS — N20.0 NEPHROLITHIASIS: Primary | ICD-10-CM

## 2023-12-12 PROCEDURE — 99214 OFFICE O/P EST MOD 30 MIN: CPT | Performed by: INTERNAL MEDICINE

## 2023-12-12 RX ORDER — AMLODIPINE BESYLATE 2.5 MG/1
2.5 TABLET ORAL DAILY
Qty: 30 TABLET | Refills: 2 | Status: SHIPPED | OUTPATIENT
Start: 2023-12-12

## 2023-12-12 RX ORDER — POTASSIUM CHLORIDE 750 MG/1
10 CAPSULE, EXTENDED RELEASE ORAL DAILY
Qty: 30 CAPSULE | Refills: 1 | Status: SHIPPED | OUTPATIENT
Start: 2023-12-12

## 2023-12-12 RX ORDER — CHLORTHALIDONE 25 MG/1
12.5 TABLET ORAL EVERY OTHER DAY
Qty: 30 TABLET | Refills: 1 | Status: SHIPPED | OUTPATIENT
Start: 2023-12-12

## 2023-12-12 NOTE — PROGRESS NOTES
Assessment & Plan:    1. Nephrolithiasis  -     potassium chloride (MICRO-K) 10 MEQ CR capsule; Take 1 capsule (10 mEq total) by mouth daily  -     Basic metabolic panel; Future; Expected date: 12/21/2023  -     Magnesium; Future; Expected date: 12/21/2023    2. Primary hypertension  -     amLODIPine (NORVASC) 2.5 mg tablet; Take 1 tablet (2.5 mg total) by mouth daily  -     chlorthalidone 25 mg tablet; Take 0.5 tablets (12.5 mg total) by mouth every other day    3. Hypokalemia    4. Metabolic alkalosis         Nephrolithiasis  Patient could not tolerate potassium citrate pills.   Will advise KCL to maintain serum potassium temporarily--Kcl 10meq/day and high potassium diet.. Her citrate was borderline on 24 hr urine. Goal is to normalize potassium with treatment of thiazide to avoid worsening hypocalciuria. Hypokalemia can also contribute to metabolic alkalosis.  Recheck chem in 1-2 weeks. If K/TCO2 normalizes, then a sodium citrate-Kcitrate combination liquid may be advised, holding on for now with elevated TCO2.    2. Primary HTN  Stop HCTZ and start chlorthaldone 12.5 mg every other day. Chorthalidone longer acting and more potent. Typically HCTZ would need to be dosed twice a day and shows little efficacy in recent trials stone management, especially at this dose.    3. Hypokalemia  K mildly low.   Will advise KCL to maintain serum potassium temporarily--Kcl 10meq/day and high potassium diet.. Her citrate was borderline on 24 hr urine. Goal is to normalize potassium with treatment of thiazide to avoid worsening hypocalciuria. Hypokalemia can also contribute to metabolic alkalosis.  Recheck chem in 1-2 weeks. If K/TCO2 normalizes, then a sodium citrate-Kcitrate combination liquid may be advised, holding on for now with elevated TCO2.    4. Metabolic alkalosis  See recommendation above with KCL.     The benefits, risks and alternatives to the treatment plan were discussed at this visit. Patient was advised of  common adverse effects of any medical therapies prescribed. All questions were answered and discussed with the patient and any accompanying family members or caretakers.      Subjective:      Patient ID: Hailee Willis is a 63 y.o. female presents for follow up in the [place] office. Patient last seen 8/9/23 by Dr Millan who started patient on potassium 20meq BID and HCTZ 25mg/day to help with stone management for borderline elevated urine calcium and low urine citrate.     HPI    In the interim since last visit, denies any new medical conditions,surgeries, allergies or medications.    No repeat stone risk profile performed.  She had repeat CT of the abdomen in November that showed some increased stone burden left upper pole more more stone burden left lower pole. Although stones noted both sides. She has enlargening staghorn calculi left kidney. Due to worsening concern for UTI in past several months with worsening stone burden she will undergo repeat intervention with urology.  She was prescribed keflex by urology for 7 days starting 12/1.     Her baseline Cr appears in 1.0-1.1 range.  Her most recent labs show K 3.4 and TCO2 33.     at home.     The following portions of the patient's history were reviewed and updated as appropriate: allergies, current medications, past family history, past medical history, past social history, past surgical history, and problem list.    Review of Systems   Respiratory: Negative.     Cardiovascular: Negative.    Genitourinary: Negative.    Neurological: Negative.    All other systems reviewed and are negative.        Objective:      /64 (BP Location: Left arm, Patient Position: Sitting, Cuff Size: Large)   Pulse 87   Wt 85.7 kg (189 lb)   SpO2 96%   BMI 32.44 kg/m²          Physical Exam  Vitals reviewed.   Constitutional:       General: She is not in acute distress.  HENT:      Head: Atraumatic.      Nose: Nose normal.      Mouth/Throat:      Mouth: Mucous  membranes are moist.      Pharynx: Oropharynx is clear.   Eyes:      Conjunctiva/sclera: Conjunctivae normal.   Cardiovascular:      Rate and Rhythm: Normal rate and regular rhythm.      Heart sounds:      No friction rub.   Pulmonary:      Breath sounds: Normal breath sounds. No wheezing or rales.   Abdominal:      Tenderness: There is no abdominal tenderness. There is no guarding.   Musculoskeletal:      Right lower leg: No edema.      Left lower leg: No edema.   Skin:     Coloration: Skin is not jaundiced.      Findings: No bruising or rash.   Neurological:      General: No focal deficit present.      Mental Status: She is alert and oriented to person, place, and time. Mental status is at baseline.      Cranial Nerves: No cranial nerve deficit.   Psychiatric:         Mood and Affect: Mood normal.             Lab Results   Component Value Date    SODIUM 139 03/27/2023    K 3.9 03/27/2023     03/27/2023    CO2 28 03/27/2023    AGAP 6 03/27/2023    BUN 14 03/27/2023    CREATININE 1.02 03/27/2023    GLUC 88 03/27/2023    CALCIUM 9.6 03/27/2023    AST 24 03/27/2023    ALT 29 03/27/2023    ALKPHOS 115 (H) 03/27/2023    TP 7.4 03/27/2023    TBILI 0.28 03/27/2023    EGFR 59 03/27/2023      Lab Results   Component Value Date    CREATININE 1.02 03/27/2023    CREATININE 1.28 12/26/2022    CREATININE 0.98 11/28/2022    CREATININE 0.94 02/21/2022    CREATININE 0.97 05/22/2021    CREATININE 1.27 05/21/2021    CREATININE 1.41 (H) 05/20/2021    CREATININE 0.95 04/08/2021    CREATININE 1.35 (H) 04/07/2021    CREATININE 0.95 04/06/2021      Lab Results   Component Value Date    COLORU yellow 11/29/2023    CLARITYU clear 11/29/2023    SPECGRAV 1.025 12/26/2022    PHUR 6.0 12/26/2022    LEUKOCYTESUR 3+/500 11/29/2023    NITRITE negative 11/29/2023    PROTEIN UA +/15 11/29/2023    GLUCOSEU negative 11/29/2023    KETONESU negative 11/29/2023    UROBILINOGEN 0.2 11/29/2023    BILIRUBINUR negative 11/29/2023    BLOODU +/10  "11/29/2023    RBCUA 10-20 (A) 12/26/2022    WBCUA Innumerable (A) 12/26/2022    EPIS Occasional 12/26/2022    BACTERIA Moderate (A) 12/26/2022      No results found for: \"LABPROT\"  No results found for: \"MICROALBUR\", \"DJRC68IAI\"  Lab Results   Component Value Date    WBC 5.74 03/27/2023    HGB 12.7 03/27/2023    HCT 40.2 03/27/2023    MCV 87 03/27/2023     03/27/2023      Lab Results   Component Value Date    HGB 12.7 03/27/2023    HGB 11.9 01/30/2023    HGB 11.4 (L) 12/26/2022    HGB 12.6 11/28/2022    HGB 12.9 02/21/2022      Lab Results   Component Value Date    FERRITIN 151 02/21/2022      No results found for: \"PTHCALCIUM\", \"JSYK92UNOUKS\", \"PHOSPHORUS\"   Lab Results   Component Value Date    CHOLESTEROL 265 (H) 02/21/2022    HDL 67 02/21/2022    LDLCALC 149 (H) 02/21/2022    TRIG 244 (H) 02/21/2022      No results found for: \"URICACID\"   Lab Results   Component Value Date    HGBA1C 5.5 02/21/2022      No results found for: \"TSHANTIBODY\", \"H8RTPAA\", \"FREET4\"   No results found for: \"NAKUL\", \"DSDNAAB\", \"RFIGM\"   No results found for: \"PROT\", \"UPEP\", \"IMMUNOFIX\", \"KAPPALAMBDA\", \"KAPPALIGHT\"     Portions of the record may have been created with voice recognition software. Occasional wrong word or \"sound a like\" substitutions may have occurred due to the inherent limitations of voice recognition software. Read the chart carefully and recognize, using context, where substitutions have occurred. If you have any questions, please contact the dictating provider.      "

## 2023-12-12 NOTE — PATIENT INSTRUCTIONS
Decrease amlodipine to 2.5mg/day.  Increase fluid intake by 8-16 ounces per day.  Stop hydrochlorothiazide and start chlorthalidone 12.5mg (half pill) every other day. This is long acting and more potent, this is why we are decreasing amlodipine. Hydrochlorothiazide is short acting and typically needs to be dosed twice a day, this is not as effective and is not typically recommended first line.  Your urine citrate was actually normal. But your urine pH was a little low. We can use potassium citrate to get your urine pH up and increase urine citrate to above 640.After your repeat blood work.  High potassium diet encourage for now-- bananas,tomatoes,potatoes,orange juice.Take once a day potassium chloride supplement until blood work,then we will switch to citrate.  Since we are making changes in 2 weeks.   Will repeat stone risk profile in March, 1-2 months post your urologic procedure.   If any issues with medication, please my chart.

## 2023-12-13 ENCOUNTER — TELEPHONE (OUTPATIENT)
Dept: NEPHROLOGY | Facility: CLINIC | Age: 63
End: 2023-12-13

## 2023-12-14 ENCOUNTER — TELEPHONE (OUTPATIENT)
Dept: UROLOGY | Facility: CLINIC | Age: 63
End: 2023-12-14

## 2023-12-14 NOTE — TELEPHONE ENCOUNTER
[facility] offering next available OR date at [facility] is 2/14.  Pt to c/b for scheduling

## 2023-12-19 ENCOUNTER — TELEPHONE (OUTPATIENT)
Dept: UROLOGY | Facility: CLINIC | Age: 63
End: 2023-12-19

## 2023-12-19 DIAGNOSIS — N20.0 RENAL CALCULI: Primary | ICD-10-CM

## 2023-12-19 PROBLEM — Z87.440 HISTORY OF RECURRENT UTIS: Status: ACTIVE | Noted: 2023-12-19

## 2023-12-19 NOTE — PROGRESS NOTES
UROLOGY PROGRESS NOTE         NAME: Hailee Willis  AGE: 63 y.o. SEX: female  : 1960   MRN: [id number]    DATE: 2023  TIME: 7:13 AM    Assessment and Plan   Procedures     Impression:   1. History of recurrent UTIs    2. Renal calculi         Plan: Please see discussion below.  Based on CT scan and KUB imaging recommend best way to make patient's known free is a PCNL for the left lower pole staghorn.    Will send a urine culture and if positive will treat and would recommend chemoprophylaxis until she set up with one of the [facility] physicians who does PCNL.  Patient understands and agrees.      Chief Complaint   No chief complaint on file.    History of Present Illness     HPI: Hailee Willis is a 63 y.o. year old female who presents with follow-up urological care with [facility] urology.  Last seen by Carl Conway managed by Dr. Beckett on 2023.  Patient had ureteroscopy with lithotripsy in 2022 by Dr. Beckett.  She had a retained ureteral stent from prior lithotripsy.  This was specifically removed by interventional radiology on 2023 during a left PCNL the PERC tube was removed on 2023.    CT stone protocol 11/15/2023 showed bilateral intrarenal calculi with numerous on the left.  There was also an enlarging staghorn left lower pole stone from 1.6 cm to 2.1.  Per Dr. Beckett's note due to increased size of the stone and recurrent urinary infections he is recommending a left ureteroscopy laser lithotripsy and left stent.  Her urine culture from 2023 grew out Proteus.    Patient here today to discuss stone management      Reviewed the KUB today with the patient.  Patient has recurrent UTIs Proteus in the urine, and intermittent left flank pain.  The large stone was over 2 cm is in the lower pole and I told the patient I do not feel ureteroscopy would be the right operation.  It would likely require multiple procedures and given the ankle may be difficult to get  to.    I told the patient I think a PCNL approach would be the best way to get rid of her stones and hopefully take care of her recurrent UTIs and flank pain.    Spoke with Dr. Beckett he recommended seeing one of the docs down at [place] at [facility] that does PCNL's and will make arrangements for that.  Patient understands and agrees        The following portions of the patient's history were reviewed and updated as appropriate: allergies, current medications, past family history, past medical history, past social history, past surgical history and problem list.  Past Medical History:   Diagnosis Date    Breast cancer (HCC)     Pt had in left breast- had radiation and surgery    COVID-19     pt had 2 times- ; 2022    CPAP (continuous positive airway pressure) dependence     Pt uses nightly    GERD (gastroesophageal reflux disease)     H/O cervical fracture     Hyperlipidemia     Irregular heart beat     Pt is taking Zetia. Pt does not have happen anymore per pt since on medication    Kidney stone     Liver disease     fatty liver    Pancreatitis     Pericarditis     Rheumatoid arthritis (HCC)     Seasonal allergies     Sleep apnea     Wears glasses      Past Surgical History:   Procedure Laterality Date    BLADDER SUSPENSION      BREAST LUMPECTOMY       SECTION      x2    COLONOSCOPY      EGD      FL RETROGRADE PYELOGRAM  2021    FOOT SURGERY Right     HYSTERECTOMY      IR NEPHROURETERAL ACCESS FOR UROLOGY PCNL  2023    IR NEPHROURETERAL STENT PLACEMENT Left     NE CYSTO BLADDER W/URETERAL CATHETERIZATION Left 2021    Procedure: CYSTOSCOPY RETROGRADE PYELOGRAM WITH INSERTION STENT URETERAL;  Surgeon: Donny Magana MD;  Location: [facility];  Service: Urology    NE CYSTO/URETERO W/LITHOTRIPSY &INDWELL STENT INSRT Left 2021    Procedure: CYSTOSCOPY URETEROSCOPY WITH LITHOTRIPSY HOLMIUM LASER,  AND INSERTION STENT URETERAL;  Surgeon: Jason Beckett MD;  Location:  [facility];  Service: Urology    MN CYSTO/URETERO W/LITHOTRIPSY &INDWELL STENT INSRT Left 12/21/2022    Procedure: CYSTOSCOPY URETEROSCOPY WITH LITHOTRIPSY HOLMIUM LASER,  INSERTION STENT URETERAL, attempted removal of foreign body;  Surgeon: Jason Beckett MD;  Location: [facility];  Service: Urology    SINUS SURGERY      Deviated septum     shoulder  Review of Systems     Const: Denies chills, fever and weight loss.  CV: Denies chest pain.  Resp: Denies SOB.  GI: Denies abdominal pain, nausea and vomiting.  : Denies symptoms other than stated above.  Musculo: Denies back pain.    Objective   There were no vitals taken for this visit.    Physical Exam  Const: Appears healthy and well developed. No signs of acute distress present.  Resp: Respirations are regular and unlabored.   CV: Rate is regular. Rhythm is regular.  Abdomen: Abdomen is soft, nontender, and nondistended. Kidneys are not palpable.  : dnp  Psych: Patient's attitude is cooperative. Mood is normal. Affect is normal.    Current Medications     Current Outpatient Medications:     amLODIPine (NORVASC) 2.5 mg tablet, Take 1 tablet (2.5 mg total) by mouth daily, Disp: 30 tablet, Rfl: 2    Ascorbic Acid (Vitamin C) 500 MG CAPS, Take 1 capsule by mouth in the morning, Disp: , Rfl:     atorvastatin (LIPITOR) 40 mg tablet, Take 40 mg by mouth, Disp: , Rfl:     chlorthalidone 25 mg tablet, Take 0.5 tablets (12.5 mg total) by mouth every other day, Disp: 30 tablet, Rfl: 1    Cholecalciferol 50 MCG (2000 UT) CAPS, Take 1 capsule by mouth daily 1000 units daily, Disp: , Rfl:     cyanocobalamin (VITAMIN B-12) 1000 MCG tablet, Take 1,000 mcg by mouth daily, Disp: , Rfl:     Ergocalciferol 50 MCG (2000 UT) CAPS, Take by mouth (Patient not taking: Reported on 11/29/2023), Disp: , Rfl:     ezetimibe (ZETIA) 10 mg tablet, Take 10 mg by mouth daily , Disp: , Rfl:     fluticasone (FLONASE) 50 mcg/act nasal spray, 1 spray into each nostril as needed , Disp: , Rfl:      leflunomide (ARAVA) 20 MG tablet, Take 20 mg by mouth daily, Disp: , Rfl:     Magnesium 100 MG CAPS, Take by mouth daily  (Patient not taking: Reported on 1/10/2023), Disp: , Rfl:     meclizine (ANTIVERT) 25 mg tablet, Take 1 tablet (25 mg total) by mouth 3 (three) times a day as needed for dizziness (Patient not taking: Reported on 5/2/2023), Disp: 30 tablet, Rfl: 0    montelukast (SINGULAIR) 10 mg tablet, Take 10 mg by mouth daily, Disp: , Rfl:     Multiple Vitamins-Minerals (Multivitamin Gummies Womens) CHEW, Chew 2 each daily, Disp: , Rfl:     pantoprazole (PROTONIX) 40 mg tablet, Take 40 mg by mouth daily, Disp: , Rfl:     potassium chloride (MICRO-K) 10 MEQ CR capsule, Take 1 capsule (10 mEq total) by mouth daily, Disp: 30 capsule, Rfl: 1    rosuvastatin (CRESTOR) 5 mg tablet, Take 5 mg by mouth daily, Disp: , Rfl:     sodium chloride, PF, 0.9 %, 10 mL by Intracatheter route daily Intracatheter flushing daily. May substitute prefilled syringe with normal saline 10 mL vials, 10 mL syringes, and 18 g blunt needles, Disp: 900 mL, Rfl: 1        Mina Roach MD

## 2023-12-19 NOTE — TELEPHONE ENCOUNTER
----- Message from Mina Roach MD sent at 12/19/2023  7:14 AM EST -----  This is a patient from [place] that might be looking for surgery.  She needs a KUB 1 hour prior to her visit with me next week.  I put the order in please make sure this happens to see if I think she is a surgical candidate

## 2023-12-19 NOTE — TELEPHONE ENCOUNTER
----- Message from Mina Roach MD sent at 12/19/2023  7:14 AM EST -----  This is a patient from [place] that might be looking for surgery.  She needs a KUB 1 hour prior to her visit with me next week.  I put the order in please make sure this happens to see if I think she is a surgical candidate

## 2023-12-19 NOTE — TELEPHONE ENCOUNTER
Spoke with patient and made aware of message. Patient verbalized understanding of message. She will get her KUB done 1 hour prior to the appointment.

## 2023-12-29 ENCOUNTER — HOSPITAL ENCOUNTER (OUTPATIENT)
Dept: RADIOLOGY | Facility: HOSPITAL | Age: 63
End: 2023-12-29
Payer: COMMERCIAL

## 2023-12-29 ENCOUNTER — TELEPHONE (OUTPATIENT)
Dept: UROLOGY | Facility: CLINIC | Age: 63
End: 2023-12-29

## 2023-12-29 ENCOUNTER — OFFICE VISIT (OUTPATIENT)
Dept: UROLOGY | Facility: CLINIC | Age: 63
End: 2023-12-29
Payer: COMMERCIAL

## 2023-12-29 VITALS
HEART RATE: 84 BPM | SYSTOLIC BLOOD PRESSURE: 152 MMHG | DIASTOLIC BLOOD PRESSURE: 86 MMHG | TEMPERATURE: 98.1 F | BODY MASS INDEX: 32.27 KG/M2 | OXYGEN SATURATION: 97 % | WEIGHT: 188 LBS

## 2023-12-29 DIAGNOSIS — Z87.440 HISTORY OF RECURRENT UTIS: Primary | ICD-10-CM

## 2023-12-29 DIAGNOSIS — N20.0 RENAL CALCULI: ICD-10-CM

## 2023-12-29 LAB
SL AMB  POCT GLUCOSE, UA: ABNORMAL
SL AMB LEUKOCYTE ESTERASE,UA: ABNORMAL
SL AMB POCT BILIRUBIN,UA: ABNORMAL
SL AMB POCT BLOOD,UA: ABNORMAL
SL AMB POCT CLARITY,UA: ABNORMAL
SL AMB POCT COLOR,UA: YELLOW
SL AMB POCT KETONES,UA: ABNORMAL
SL AMB POCT NITRITE,UA: ABNORMAL
SL AMB POCT PH,UA: 6.5
SL AMB POCT SPECIFIC GRAVITY,UA: 1.02
SL AMB POCT URINE PROTEIN: ABNORMAL
SL AMB POCT UROBILINOGEN: 0.2

## 2023-12-29 PROCEDURE — 87077 CULTURE AEROBIC IDENTIFY: CPT | Performed by: UROLOGY

## 2023-12-29 PROCEDURE — 99214 OFFICE O/P EST MOD 30 MIN: CPT | Performed by: UROLOGY

## 2023-12-29 PROCEDURE — 87086 URINE CULTURE/COLONY COUNT: CPT | Performed by: UROLOGY

## 2023-12-29 PROCEDURE — 81003 URINALYSIS AUTO W/O SCOPE: CPT | Performed by: UROLOGY

## 2023-12-29 PROCEDURE — 87186 SC STD MICRODIL/AGAR DIL: CPT | Performed by: UROLOGY

## 2023-12-29 PROCEDURE — 74018 RADEX ABDOMEN 1 VIEW: CPT

## 2023-12-29 NOTE — TELEPHONE ENCOUNTER
GEOR[place] Stephen that she was referred by Dr Beckett to have surgery with Courtney davis. She is tentatively scheduled for 1/22  @ 1100 gave [place]'s address and phone number to call to confirm

## 2023-12-29 NOTE — TELEPHONE ENCOUNTER
----- Message from Mina Roach MD sent at 12/29/2023  8:22 AM EST -----  Dr. Beckett sent this patient to me for a possible U scope.  I do not feel that is the right operation for her.  I talked with Dr. Beckett regarding her and I feel she needs to have a PCNL.  Can you please send her to one of the doctors down there to get set up for an evaluation for a PCNL surgery for a 2 cm left lower pole staghorn stone that Dr. Beckett sent to me because of intermittent left flank pain and recurrent UTIs

## 2023-12-31 LAB — BACTERIA UR CULT: ABNORMAL

## 2024-01-02 DIAGNOSIS — Z87.440 HISTORY OF RECURRENT UTIS: Primary | ICD-10-CM

## 2024-01-02 NOTE — TELEPHONE ENCOUNTER
----- Message from Mina Roach MD sent at 1/1/2024  6:42 AM EST -----  Please let her know rec qd bactrim po until evaluated by doc in [place] to see about her surgery --please pend me bactrim ds I po qd 90 ---one rf --thanks

## 2024-01-03 ENCOUNTER — TELEPHONE (OUTPATIENT)
Dept: UROLOGY | Facility: CLINIC | Age: 64
End: 2024-01-03

## 2024-01-03 RX ORDER — SULFAMETHOXAZOLE AND TRIMETHOPRIM 800; 160 MG/1; MG/1
1 TABLET ORAL DAILY
Qty: 30 TABLET | Refills: 0 | Status: SHIPPED | OUTPATIENT
Start: 2024-01-03 | End: 2024-02-02

## 2024-01-03 NOTE — TELEPHONE ENCOUNTER
----- Message from Mina Roach MD sent at 1/1/2024  6:42 AM EST -----  Please let her know rec qd bactrim po until evaluated by doc in [place] to see about her surgery --please pend me bactrim ds I po qd 90 ---one rf --thanks

## 2024-01-22 ENCOUNTER — ANESTHESIA EVENT (OUTPATIENT)
Dept: PERIOP | Facility: HOSPITAL | Age: 64
End: 2024-01-22
Payer: COMMERCIAL

## 2024-01-22 ENCOUNTER — TELEPHONE (OUTPATIENT)
Dept: UROLOGY | Facility: AMBULATORY SURGERY CENTER | Age: 64
End: 2024-01-22

## 2024-01-22 ENCOUNTER — CONSULT (OUTPATIENT)
Dept: UROLOGY | Facility: CLINIC | Age: 64
End: 2024-01-22
Payer: COMMERCIAL

## 2024-01-22 VITALS
DIASTOLIC BLOOD PRESSURE: 80 MMHG | SYSTOLIC BLOOD PRESSURE: 124 MMHG | HEIGHT: 64 IN | HEART RATE: 75 BPM | WEIGHT: 195.6 LBS | OXYGEN SATURATION: 95 % | BODY MASS INDEX: 33.39 KG/M2

## 2024-01-22 DIAGNOSIS — N20.0 NEPHROLITHIASIS: Primary | ICD-10-CM

## 2024-01-22 PROCEDURE — 99214 OFFICE O/P EST MOD 30 MIN: CPT | Performed by: UROLOGY

## 2024-01-22 NOTE — ANESTHESIA PREPROCEDURE EVALUATION
Procedure:  CYSTOSCOPY URETEROSCOPY WITH LITHOTRIPSY HOLMIUM LASER, RETROGRADE PYELOGRAM AND INSERTION STENT URETERAL (Left: Ureter)  Multiple Procedures for Renal,Ureteral Calculi  Relevant Problems   CARDIO   (+) Precordial chest pain      GI/HEPATIC   (+) Gastroesophageal reflux disease without esophagitis      /RENAL   (+) Hydronephrosis of left kidney   (+) Renal calculi      GYN   (+) Malignant neoplasm of female breast (HCC)      MUSCULOSKELETAL   (+) Rheumatoid arthritis (HCC)      PULMONARY   (+) Sleep apnea   Routine Cpap   URI  12/23-Resolved  Physical Exam    Airway  Comment: MAC3 Gd 1 view 1/30/23  Mallampati score: II  TM Distance: >3 FB  Neck ROM: full     Dental   No notable dental hx     Cardiovascular      Pulmonary      Other Findings  post-pubertal.      Anesthesia Plan  ASA Score- 3     Anesthesia Type- general with ASA Monitors.         Additional Monitors:     Airway Plan: ETT.           Plan Factors-Exercise tolerance (METS): >4 METS.    Chart reviewed. EKG reviewed.  Existing labs reviewed. Patient summary reviewed.    Patient is not a current smoker.              Induction- intravenous.    Postoperative Plan-     Informed Consent- Anesthetic plan and risks discussed with patient and spouse.  I personally reviewed this patient with the CRNA. Discussed and agreed on the Anesthesia Plan with the CRNA..

## 2024-01-22 NOTE — H&P (VIEW-ONLY)
1/22/2024    Hailee Willis  1960  [id number]        Assessment  Nephrolithiasis    Plan  We had a long discussion today with the patient and her  regarding her history.  I went through her entire chart and reviewed her extensive history with her.  We also discussed treatment of residual stones.  On occasion patients with simply observe, however in her case she has formed infection stones based on prior stone analysis.  She does have recent infection and recurrent infections.  She is on appropriate Bactrim prophylaxis now.    We discussed intervention with percutaneous approach or ureteroscopy.  She expects percutaneous removal with the understanding that it is more likely to make her stone free.  In my experience with upgraded ureteroscope and laser technologies, my preference would be to try a ureteroscopy first.  Has been over 1 year since her last ureteroscopy.  I would like to attempt ureteroscopy to access all calyces and laser all visible calculi.  They do understand that this may not be successful however I think is the best option at this point.  Technique, risk and benefits reviewed.  If they are not happy we can always decide to proceed with PCNL in the future or additional ureteroscopy.    Consent obtained for left ureteroscopy, laser, stone extraction, stent placement.  All questions answered and they agree with the plan.          History of Present Illness  Hailee is a 63 y.o. female with extensive stone history.  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  She underwent multiple procedures on this including ureteroscopy.  Unfortunately proximal portion of her ureteral stent with a portion of wire became dislodged and stuck in her upper pole calyx.  She underwent percutaneous nephrostomy with retrieval by interventional radiology.  This was in January 2023.  Since then she has not had any renal colic.  However she does have recurrent urinary tract  infections.  She was treated for infection on  with Bactrim and is currently on daily prophylaxis with Bactrim.    CT reveals residual lower pole staghorn type calculus as well as smaller upper pole stones.            Review of Systems  Review of Systems   Constitutional: Negative.    HENT: Negative.     Respiratory: Negative.     Cardiovascular: Negative.    Gastrointestinal: Negative.    Genitourinary:         As per HPI   Musculoskeletal: Negative.    Skin: Negative.    Neurological: Negative.    Hematological: Negative.          Past Medical History  Past Medical History:   Diagnosis Date    Breast cancer (HCC)     Pt had in left breast- had radiation and surgery    COVID-19     pt had 2 times- ; 2022    CPAP (continuous positive airway pressure) dependence     Pt uses nightly    GERD (gastroesophageal reflux disease)     H/O cervical fracture     Hyperlipidemia     Irregular heart beat     Pt is taking Zetia. Pt does not have happen anymore per pt since on medication    Kidney stone     Liver disease     fatty liver    Pancreatitis     Pericarditis     Rheumatoid arthritis (HCC)     Seasonal allergies     Sleep apnea     Wears glasses        Past Social History  Past Surgical History:   Procedure Laterality Date    BLADDER SUSPENSION      BREAST LUMPECTOMY       SECTION      x2    COLONOSCOPY      EGD      FL RETROGRADE PYELOGRAM  2021    FOOT SURGERY Right     HYSTERECTOMY      IR NEPHROURETERAL ACCESS FOR UROLOGY PCNL  2023    IR NEPHROURETERAL STENT PLACEMENT Left     [place] CYSTO BLADDER W/URETERAL CATHETERIZATION Left 2021    Procedure: CYSTOSCOPY RETROGRADE PYELOGRAM WITH INSERTION STENT URETERAL;  Surgeon: Donny Magana MD;  [facility];  Service: Urology    [place] CYSTO/URETERO W/LITHOTRIPSY &INDWELL STENT INSRT Left 2021    Procedure: CYSTOSCOPY URETEROSCOPY WITH LITHOTRIPSY HOLMIUM LASER,  AND INSERTION STENT URETERAL;  Surgeon: Jason Beckett  MD;  Location:  MAIN OR;  Service: Urology    [place] CYSTO/URETERO W/LITHOTRIPSY &INDWELL STENT INSRT Left 12/21/2022    Procedure: CYSTOSCOPY URETEROSCOPY WITH LITHOTRIPSY HOLMIUM LASER,  INSERTION STENT URETERAL, attempted removal of foreign body;  Surgeon: Jason Beckett MD;  Location:  MAIN OR;  Service: Urology    SINUS SURGERY      Deviated septum       Past Family History  Family History   Problem Relation Age of Onset    Lung cancer Father     Heart disease Maternal Aunt     Heart disease Maternal Uncle     Kidney cancer Maternal Grandfather     Lung cancer Maternal Grandfather     Heart attack Brother        Past Social history  Social History     Socioeconomic History    Marital status: /Civil Union     Spouse name: Not on file    Number of children: Not on file    Years of education: Not on file    Highest education level: Not on file   Occupational History    Not on file   Tobacco Use    Smoking status: Former     Current packs/day: 0.00     Types: Cigarettes     Quit date: 2009     Years since quitting: 15.0    Smokeless tobacco: Never   Vaping Use    Vaping status: Never Used   Substance and Sexual Activity    Alcohol use: Yes     Comment: rarely, socially    Drug use: Not Currently    Sexual activity: Not Currently     Comment: did not want   Other Topics Concern    Not on file   Social History Narrative    Not on file     Social Determinants of Health     Financial Resource Strain: Low Risk  (7/19/2023)    Received from [facility]    Overall Financial Resource Strain (CARDIA)     Difficulty of Paying Living Expenses: Not hard at all   Food Insecurity: No Food Insecurity (7/19/2023)    Received from [facility]    Hunger Vital Sign     Worried About Running Out of Food in the Last Year: Never true     Ran Out of Food in the Last Year: Never true   Transportation Needs: No Transportation Needs (7/19/2023)    Received from [facility]    PRAJOSE -  Transportation     Lack of Transportation (Medical): No     Lack of Transportation (Non-Medical): No   Physical Activity: Insufficiently Active (7/19/2023)    Received from [facility]    Exercise Vital Sign     Days of Exercise per Week: 4 days     Minutes of Exercise per Session: 30 min   Stress: No Stress Concern Present (7/19/2023)    Received from [facility]    [employer] - Occupational Stress Questionnaire     Feeling of Stress : Only a little   Social Connections: Socially Integrated (7/19/2023)    Received from [facility]    Social Connection and Isolation Panel [NHANES]     Frequency of Communication with Friends and Family: Three times a week     Frequency of Social Gatherings with Friends and Family: More than three times a week     Attends Yazidi Services: More than 4 times per year     Active Member of Clubs or Organizations: Yes     Attends Club or Organization Meetings: 1 to 4 times per year     Marital Status:    Intimate Partner Violence: Not At Risk (7/19/2023)    Received from [facility]    Humiliation, Afraid, Rape, and Kick questionnaire     Fear of Current or Ex-Partner: No     Emotionally Abused: No     Physically Abused: No     Sexually Abused: No   Housing Stability: Low Risk  (7/19/2023)    Received from [facility]    Housing Stability Vital Sign     Unable to Pay for Housing in the Last Year: No     Number of Places Lived in the Last Year: 1     Unstable Housing in the Last Year: No     Social History     Tobacco Use   Smoking Status Former    Current packs/day: 0.00    Types: Cigarettes    Quit date: 2009    Years since quitting: 15.0   Smokeless Tobacco Never       Current Medications  Current Outpatient Medications   Medication Sig Dispense Refill    amLODIPine (NORVASC) 2.5 mg tablet Take 1 tablet (2.5 mg total) by mouth daily 30 tablet 2    atorvastatin  "(LIPITOR) 40 mg tablet Take 40 mg by mouth      chlorthalidone 25 mg tablet Take 0.5 tablets (12.5 mg total) by mouth every other day 30 tablet 1    Cholecalciferol 50 MCG (2000 UT) CAPS Take 1 capsule by mouth daily 1000 units daily      cyanocobalamin (VITAMIN B-12) 1000 MCG tablet Take 1,000 mcg by mouth daily      ezetimibe (ZETIA) 10 mg tablet Take 10 mg by mouth daily       fluticasone (FLONASE) 50 mcg/act nasal spray 1 spray into each nostril as needed       leflunomide (ARAVA) 20 MG tablet Take 20 mg by mouth daily      montelukast (SINGULAIR) 10 mg tablet Take 10 mg by mouth daily      Multiple Vitamins-Minerals (Multivitamin Gummies Womens) CHEW Chew 2 each daily      pantoprazole (PROTONIX) 40 mg tablet Take 40 mg by mouth daily      rosuvastatin (CRESTOR) 5 mg tablet Take 5 mg by mouth daily      sulfamethoxazole-trimethoprim (BACTRIM DS) 800-160 mg per tablet Take 1 tablet by mouth daily 30 tablet 0    Ascorbic Acid (Vitamin C) 500 MG CAPS Take 1 capsule by mouth in the morning (Patient not taking: Reported on 12/29/2023)      Ergocalciferol 50 MCG (2000 UT) CAPS Take by mouth (Patient not taking: Reported on 11/29/2023)      meclizine (ANTIVERT) 25 mg tablet Take 1 tablet (25 mg total) by mouth 3 (three) times a day as needed for dizziness (Patient not taking: Reported on 1/22/2024) 30 tablet 0    potassium chloride (MICRO-K) 10 MEQ CR capsule Take 1 capsule (10 mEq total) by mouth daily (Patient not taking: Reported on 1/22/2024) 30 capsule 1    sodium chloride, PF, 0.9 % 10 mL by Intracatheter route daily Intracatheter flushing daily. May substitute prefilled syringe with normal saline 10 mL vials, 10 mL syringes, and 18 g blunt needles (Patient not taking: Reported on 1/22/2024) 900 mL 1     No current facility-administered medications for this visit.       Allergies  Allergies   Allergen Reactions    Azathioprine Swelling     Pt reports face gets \"puffy\", red and face feels hot.    Ciprofloxacin " "Hives     ? Rash      Amoxicillin-Pot Clavulanate Hives    Celecoxib Rash    Colchicine Rash    Penicillins Rash       Past Medical History, Social History, Family History, medications and allergies were reviewed.    Vitals  Vitals:    01/22/24 1100   BP: 124/80   BP Location: Left arm   Patient Position: Sitting   Cuff Size: Large   Pulse: 75   SpO2: 95%   Weight: 88.7 kg (195 lb 9.6 oz)   Height: 5' 4\" (1.626 m)       Physical Exam  Physical Exam  Vitals reviewed.   Constitutional:       Appearance: She is well-developed.   HENT:      Head: Normocephalic and atraumatic.   Eyes:      Conjunctiva/sclera: Conjunctivae normal.   Cardiovascular:      Rate and Rhythm: Normal rate and regular rhythm.   Pulmonary:      Effort: Pulmonary effort is normal.      Breath sounds: Normal breath sounds.   Abdominal:      Palpations: Abdomen is soft.   Musculoskeletal:         General: Normal range of motion.   Skin:     General: Skin is warm and dry.   Neurological:      Mental Status: She is alert and oriented to person, place, and time.   Psychiatric:         Mood and Affect: Mood normal.           Results  No results found for: \"PSA\"  Lab Results   Component Value Date    CALCIUM 9.6 03/27/2023    K 3.9 03/27/2023    CO2 28 03/27/2023     03/27/2023    BUN 14 03/27/2023    CREATININE 1.02 03/27/2023     Lab Results   Component Value Date    WBC 5.74 03/27/2023    HGB 12.7 03/27/2023    HCT 40.2 03/27/2023    MCV 87 03/27/2023     03/27/2023           "

## 2024-01-22 NOTE — PROGRESS NOTES
1/22/2024    Hailee Willis  1960  [id number]        Assessment  Nephrolithiasis    Plan  We had a long discussion today with the patient and her  regarding her history.  I went through her entire chart and reviewed her extensive history with her.  We also discussed treatment of residual stones.  On occasion patients with simply observe, however in her case she has formed infection stones based on prior stone analysis.  She does have recent infection and recurrent infections.  She is on appropriate Bactrim prophylaxis now.    We discussed intervention with percutaneous approach or ureteroscopy.  She expects percutaneous removal with the understanding that it is more likely to make her stone free.  In my experience with upgraded ureteroscope and laser technologies, my preference would be to try a ureteroscopy first.  Has been over 1 year since her last ureteroscopy.  I would like to attempt ureteroscopy to access all calyces and laser all visible calculi.  They do understand that this may not be successful however I think is the best option at this point.  Technique, risk and benefits reviewed.  If they are not happy we can always decide to proceed with PCNL in the future or additional ureteroscopy.    Consent obtained for left ureteroscopy, laser, stone extraction, stent placement.  All questions answered and they agree with the plan.          History of Present Illness  Hailee is a 63 y.o. female with extensive stone history.  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  She underwent multiple procedures on this including ureteroscopy.  Unfortunately proximal portion of her ureteral stent with a portion of wire became dislodged and stuck in her upper pole calyx.  She underwent percutaneous nephrostomy with retrieval by interventional radiology.  This was in January 2023.  Since then she has not had any renal colic.  However she does have recurrent urinary tract  infections.  She was treated for infection on  with Bactrim and is currently on daily prophylaxis with Bactrim.    CT reveals residual lower pole staghorn type calculus as well as smaller upper pole stones.            Review of Systems  Review of Systems   Constitutional: Negative.    HENT: Negative.     Respiratory: Negative.     Cardiovascular: Negative.    Gastrointestinal: Negative.    Genitourinary:         As per HPI   Musculoskeletal: Negative.    Skin: Negative.    Neurological: Negative.    Hematological: Negative.          Past Medical History  Past Medical History:   Diagnosis Date    Breast cancer (HCC)     Pt had in left breast- had radiation and surgery    COVID-19     pt had 2 times- ; 2022    CPAP (continuous positive airway pressure) dependence     Pt uses nightly    GERD (gastroesophageal reflux disease)     H/O cervical fracture     Hyperlipidemia     Irregular heart beat     Pt is taking Zetia. Pt does not have happen anymore per pt since on medication    Kidney stone     Liver disease     fatty liver    Pancreatitis     Pericarditis     Rheumatoid arthritis (HCC)     Seasonal allergies     Sleep apnea     Wears glasses        Past Social History  Past Surgical History:   Procedure Laterality Date    BLADDER SUSPENSION      BREAST LUMPECTOMY       SECTION      x2    COLONOSCOPY      EGD      FL RETROGRADE PYELOGRAM  2021    FOOT SURGERY Right     HYSTERECTOMY      IR NEPHROURETERAL ACCESS FOR UROLOGY PCNL  2023    IR NEPHROURETERAL STENT PLACEMENT Left     [place] CYSTO BLADDER W/URETERAL CATHETERIZATION Left 2021    Procedure: CYSTOSCOPY RETROGRADE PYELOGRAM WITH INSERTION STENT URETERAL;  Surgeon: Donny Magana MD;  [facility];  Service: Urology    [place] CYSTO/URETERO W/LITHOTRIPSY &INDWELL STENT INSRT Left 2021    Procedure: CYSTOSCOPY URETEROSCOPY WITH LITHOTRIPSY HOLMIUM LASER,  AND INSERTION STENT URETERAL;  Surgeon: Jason Beckett  MD;  Location:  MAIN OR;  Service: Urology    [place] CYSTO/URETERO W/LITHOTRIPSY &INDWELL STENT INSRT Left 12/21/2022    Procedure: CYSTOSCOPY URETEROSCOPY WITH LITHOTRIPSY HOLMIUM LASER,  INSERTION STENT URETERAL, attempted removal of foreign body;  Surgeon: Jason Beckett MD;  Location:  MAIN OR;  Service: Urology    SINUS SURGERY      Deviated septum       Past Family History  Family History   Problem Relation Age of Onset    Lung cancer Father     Heart disease Maternal Aunt     Heart disease Maternal Uncle     Kidney cancer Maternal Grandfather     Lung cancer Maternal Grandfather     Heart attack Brother        Past Social history  Social History     Socioeconomic History    Marital status: /Civil Union     Spouse name: Not on file    Number of children: Not on file    Years of education: Not on file    Highest education level: Not on file   Occupational History    Not on file   Tobacco Use    Smoking status: Former     Current packs/day: 0.00     Types: Cigarettes     Quit date: 2009     Years since quitting: 15.0    Smokeless tobacco: Never   Vaping Use    Vaping status: Never Used   Substance and Sexual Activity    Alcohol use: Yes     Comment: rarely, socially    Drug use: Not Currently    Sexual activity: Not Currently     Comment: did not want   Other Topics Concern    Not on file   Social History Narrative    Not on file     Social Determinants of Health     Financial Resource Strain: Low Risk  (7/19/2023)    Received from [facility]    Overall Financial Resource Strain (CARDIA)     Difficulty of Paying Living Expenses: Not hard at all   Food Insecurity: No Food Insecurity (7/19/2023)    Received from [facility]    Hunger Vital Sign     Worried About Running Out of Food in the Last Year: Never true     Ran Out of Food in the Last Year: Never true   Transportation Needs: No Transportation Needs (7/19/2023)    Received from [facility]    PRAJOSE -  Transportation     Lack of Transportation (Medical): No     Lack of Transportation (Non-Medical): No   Physical Activity: Insufficiently Active (7/19/2023)    Received from [facility]    Exercise Vital Sign     Days of Exercise per Week: 4 days     Minutes of Exercise per Session: 30 min   Stress: No Stress Concern Present (7/19/2023)    Received from [facility]    [employer] - Occupational Stress Questionnaire     Feeling of Stress : Only a little   Social Connections: Socially Integrated (7/19/2023)    Received from [facility]    Social Connection and Isolation Panel [NHANES]     Frequency of Communication with Friends and Family: Three times a week     Frequency of Social Gatherings with Friends and Family: More than three times a week     Attends Protestant Services: More than 4 times per year     Active Member of Clubs or Organizations: Yes     Attends Club or Organization Meetings: 1 to 4 times per year     Marital Status:    Intimate Partner Violence: Not At Risk (7/19/2023)    Received from [facility]    Humiliation, Afraid, Rape, and Kick questionnaire     Fear of Current or Ex-Partner: No     Emotionally Abused: No     Physically Abused: No     Sexually Abused: No   Housing Stability: Low Risk  (7/19/2023)    Received from [facility]    Housing Stability Vital Sign     Unable to Pay for Housing in the Last Year: No     Number of Places Lived in the Last Year: 1     Unstable Housing in the Last Year: No     Social History     Tobacco Use   Smoking Status Former    Current packs/day: 0.00    Types: Cigarettes    Quit date: 2009    Years since quitting: 15.0   Smokeless Tobacco Never       Current Medications  Current Outpatient Medications   Medication Sig Dispense Refill    amLODIPine (NORVASC) 2.5 mg tablet Take 1 tablet (2.5 mg total) by mouth daily 30 tablet 2    atorvastatin  "(LIPITOR) 40 mg tablet Take 40 mg by mouth      chlorthalidone 25 mg tablet Take 0.5 tablets (12.5 mg total) by mouth every other day 30 tablet 1    Cholecalciferol 50 MCG (2000 UT) CAPS Take 1 capsule by mouth daily 1000 units daily      cyanocobalamin (VITAMIN B-12) 1000 MCG tablet Take 1,000 mcg by mouth daily      ezetimibe (ZETIA) 10 mg tablet Take 10 mg by mouth daily       fluticasone (FLONASE) 50 mcg/act nasal spray 1 spray into each nostril as needed       leflunomide (ARAVA) 20 MG tablet Take 20 mg by mouth daily      montelukast (SINGULAIR) 10 mg tablet Take 10 mg by mouth daily      Multiple Vitamins-Minerals (Multivitamin Gummies Womens) CHEW Chew 2 each daily      pantoprazole (PROTONIX) 40 mg tablet Take 40 mg by mouth daily      rosuvastatin (CRESTOR) 5 mg tablet Take 5 mg by mouth daily      sulfamethoxazole-trimethoprim (BACTRIM DS) 800-160 mg per tablet Take 1 tablet by mouth daily 30 tablet 0    Ascorbic Acid (Vitamin C) 500 MG CAPS Take 1 capsule by mouth in the morning (Patient not taking: Reported on 12/29/2023)      Ergocalciferol 50 MCG (2000 UT) CAPS Take by mouth (Patient not taking: Reported on 11/29/2023)      meclizine (ANTIVERT) 25 mg tablet Take 1 tablet (25 mg total) by mouth 3 (three) times a day as needed for dizziness (Patient not taking: Reported on 1/22/2024) 30 tablet 0    potassium chloride (MICRO-K) 10 MEQ CR capsule Take 1 capsule (10 mEq total) by mouth daily (Patient not taking: Reported on 1/22/2024) 30 capsule 1    sodium chloride, PF, 0.9 % 10 mL by Intracatheter route daily Intracatheter flushing daily. May substitute prefilled syringe with normal saline 10 mL vials, 10 mL syringes, and 18 g blunt needles (Patient not taking: Reported on 1/22/2024) 900 mL 1     No current facility-administered medications for this visit.       Allergies  Allergies   Allergen Reactions    Azathioprine Swelling     Pt reports face gets \"puffy\", red and face feels hot.    Ciprofloxacin " "Hives     ? Rash      Amoxicillin-Pot Clavulanate Hives    Celecoxib Rash    Colchicine Rash    Penicillins Rash       Past Medical History, Social History, Family History, medications and allergies were reviewed.    Vitals  Vitals:    01/22/24 1100   BP: 124/80   BP Location: Left arm   Patient Position: Sitting   Cuff Size: Large   Pulse: 75   SpO2: 95%   Weight: 88.7 kg (195 lb 9.6 oz)   Height: 5' 4\" (1.626 m)       Physical Exam  Physical Exam  Vitals reviewed.   Constitutional:       Appearance: She is well-developed.   HENT:      Head: Normocephalic and atraumatic.   Eyes:      Conjunctiva/sclera: Conjunctivae normal.   Cardiovascular:      Rate and Rhythm: Normal rate and regular rhythm.   Pulmonary:      Effort: Pulmonary effort is normal.      Breath sounds: Normal breath sounds.   Abdominal:      Palpations: Abdomen is soft.   Musculoskeletal:         General: Normal range of motion.   Skin:     General: Skin is warm and dry.   Neurological:      Mental Status: She is alert and oriented to person, place, and time.   Psychiatric:         Mood and Affect: Mood normal.           Results  No results found for: \"PSA\"  Lab Results   Component Value Date    CALCIUM 9.6 03/27/2023    K 3.9 03/27/2023    CO2 28 03/27/2023     03/27/2023    BUN 14 03/27/2023    CREATININE 1.02 03/27/2023     Lab Results   Component Value Date    WBC 5.74 03/27/2023    HGB 12.7 03/27/2023    HCT 40.2 03/27/2023    MCV 87 03/27/2023     03/27/2023           "

## 2024-01-22 NOTE — TELEPHONE ENCOUNTER
I called pt this afternoon to confirm scheduling her for surgery with Dr. Jaime at the [facility] for tomorrow. I was able to speak with her and she stated that tomorrow will work for her. I then verbally went over all of pt.'s pre op instructions and prep information with her. She confirmed that she will have a  and is aware that the hospital will call her in between 2-7 today with her arrival time for tomorrow. Per pt.'s request I am e-mailing her a copy of the surgical packet and instructed her to call our office with any additional questions or concerns regarding this procedure.

## 2024-01-23 ENCOUNTER — APPOINTMENT (OUTPATIENT)
Dept: RADIOLOGY | Facility: HOSPITAL | Age: 64
End: 2024-01-23
Payer: COMMERCIAL

## 2024-01-23 ENCOUNTER — HOSPITAL ENCOUNTER (OUTPATIENT)
Facility: HOSPITAL | Age: 64
Setting detail: OUTPATIENT SURGERY
Discharge: HOME | End: 2024-01-23
Attending: UROLOGY | Admitting: UROLOGY
Payer: COMMERCIAL

## 2024-01-23 ENCOUNTER — ANESTHESIA (OUTPATIENT)
Dept: PERIOP | Facility: HOSPITAL | Age: 64
End: 2024-01-23
Payer: COMMERCIAL

## 2024-01-23 VITALS
OXYGEN SATURATION: 96 % | WEIGHT: 187 LBS | BODY MASS INDEX: 31.92 KG/M2 | RESPIRATION RATE: 18 BRPM | SYSTOLIC BLOOD PRESSURE: 118 MMHG | HEART RATE: 86 BPM | DIASTOLIC BLOOD PRESSURE: 74 MMHG | TEMPERATURE: 98 F | HEIGHT: 64 IN

## 2024-01-23 DIAGNOSIS — N20.0 RENAL CALCULI: Primary | ICD-10-CM

## 2024-01-23 DIAGNOSIS — R07.2 PRECORDIAL CHEST PAIN: ICD-10-CM

## 2024-01-23 LAB
2HR DELTA HS TROPONIN: <-1 NG/L
ANION GAP SERPL CALCULATED.3IONS-SCNC: 9 MMOL/L
ATRIAL RATE: 119 BPM
ATRIAL RATE: 94 BPM
ATRIAL RATE: 97 BPM
BUN SERPL-MCNC: 20 MG/DL (ref 5–25)
CALCIUM SERPL-MCNC: 9.4 MG/DL (ref 8.4–10.2)
CARDIAC TROPONIN I PNL SERPL HS: 3 NG/L
CARDIAC TROPONIN I PNL SERPL HS: <2 NG/L
CHLORIDE SERPL-SCNC: 103 MMOL/L (ref 96–108)
CO2 SERPL-SCNC: 26 MMOL/L (ref 21–32)
CREAT SERPL-MCNC: 1.2 MG/DL (ref 0.6–1.3)
ERYTHROCYTE [DISTWIDTH] IN BLOOD BY AUTOMATED COUNT: 13.2 % (ref 11.6–15.1)
GFR SERPL CREATININE-BSD FRML MDRD: 48 ML/MIN/1.73SQ M
GLUCOSE P FAST SERPL-MCNC: 120 MG/DL (ref 65–99)
GLUCOSE SERPL-MCNC: 120 MG/DL (ref 65–140)
HCT VFR BLD AUTO: 36.9 % (ref 34.8–46.1)
HGB BLD-MCNC: 12.4 G/DL (ref 11.5–15.4)
MCH RBC QN AUTO: 29.4 PG (ref 26.8–34.3)
MCHC RBC AUTO-ENTMCNC: 33.6 G/DL (ref 31.4–37.4)
MCV RBC AUTO: 87 FL (ref 82–98)
P AXIS: 33 DEGREES
P AXIS: 71 DEGREES
P AXIS: 72 DEGREES
PLATELET # BLD AUTO: 307 THOUSANDS/UL (ref 149–390)
PMV BLD AUTO: 10.2 FL (ref 8.9–12.7)
POTASSIUM SERPL-SCNC: 4.1 MMOL/L (ref 3.5–5.3)
PR INTERVAL: 150 MS
PR INTERVAL: 179 MS
PR INTERVAL: 196 MS
QRS AXIS: 54 DEGREES
QRS AXIS: 56 DEGREES
QRS AXIS: 63 DEGREES
QRSD INTERVAL: 63 MS
QRSD INTERVAL: 75 MS
QRSD INTERVAL: 75 MS
QT INTERVAL: 346 MS
QT INTERVAL: 358 MS
QT INTERVAL: 379 MS
QTC INTERVAL: 448 MS
QTC INTERVAL: 482 MS
QTC INTERVAL: 487 MS
RBC # BLD AUTO: 4.22 MILLION/UL (ref 3.81–5.12)
SODIUM SERPL-SCNC: 138 MMOL/L (ref 135–147)
T WAVE AXIS: 60 DEGREES
T WAVE AXIS: 61 DEGREES
T WAVE AXIS: 66 DEGREES
VENTRICULAR RATE: 119 BPM
VENTRICULAR RATE: 94 BPM
VENTRICULAR RATE: 97 BPM
WBC # BLD AUTO: 5.88 THOUSAND/UL (ref 4.31–10.16)

## 2024-01-23 PROCEDURE — C2617 STENT, NON-COR, TEM W/O DEL: HCPCS | Performed by: UROLOGY

## 2024-01-23 PROCEDURE — 99245 OFF/OP CONSLTJ NEW/EST HI 55: CPT | Performed by: INTERNAL MEDICINE

## 2024-01-23 PROCEDURE — 52341 CYSTO W/URETER STRICTURE TX: CPT | Performed by: UROLOGY

## 2024-01-23 PROCEDURE — 84484 ASSAY OF TROPONIN QUANT: CPT | Performed by: ANESTHESIOLOGY

## 2024-01-23 PROCEDURE — 74420 UROGRAPHY RTRGR +-KUB: CPT

## 2024-01-23 PROCEDURE — 80048 BASIC METABOLIC PNL TOTAL CA: CPT | Performed by: ANESTHESIOLOGY

## 2024-01-23 PROCEDURE — C1769 GUIDE WIRE: HCPCS | Performed by: UROLOGY

## 2024-01-23 PROCEDURE — C1747 URETEROSCOPE DIGITAL FLEX SNGL USE RVS DEFLECTION APTRA: HCPCS | Performed by: UROLOGY

## 2024-01-23 PROCEDURE — 93005 ELECTROCARDIOGRAM TRACING: CPT

## 2024-01-23 PROCEDURE — C1758 CATHETER, URETERAL: HCPCS | Performed by: UROLOGY

## 2024-01-23 PROCEDURE — 93010 ELECTROCARDIOGRAM REPORT: CPT | Performed by: INTERNAL MEDICINE

## 2024-01-23 PROCEDURE — 85027 COMPLETE CBC AUTOMATED: CPT | Performed by: ANESTHESIOLOGY

## 2024-01-23 PROCEDURE — C1726 CATH, BAL DIL, NON-VASCULAR: HCPCS | Performed by: UROLOGY

## 2024-01-23 PROCEDURE — 52356 CYSTO/URETERO W/LITHOTRIPSY: CPT | Performed by: UROLOGY

## 2024-01-23 RX ORDER — SODIUM CHLORIDE, SODIUM LACTATE, POTASSIUM CHLORIDE, CALCIUM CHLORIDE 600; 310; 30; 20 MG/100ML; MG/100ML; MG/100ML; MG/100ML
20 INJECTION, SOLUTION INTRAVENOUS CONTINUOUS
Status: DISCONTINUED | OUTPATIENT
Start: 2024-01-23 | End: 2024-01-23

## 2024-01-23 RX ORDER — FENTANYL CITRATE 50 UG/ML
INJECTION, SOLUTION INTRAMUSCULAR; INTRAVENOUS AS NEEDED
Status: DISCONTINUED | OUTPATIENT
Start: 2024-01-23 | End: 2024-01-23

## 2024-01-23 RX ORDER — FENTANYL CITRATE/PF 50 MCG/ML
25 SYRINGE (ML) INJECTION
Status: DISCONTINUED | OUTPATIENT
Start: 2024-01-23 | End: 2024-01-23 | Stop reason: HOSPADM

## 2024-01-23 RX ORDER — HYDROCODONE BITARTRATE AND ACETAMINOPHEN 5; 325 MG/1; MG/1
1 TABLET ORAL EVERY 6 HOURS PRN
Status: DISCONTINUED | OUTPATIENT
Start: 2024-01-23 | End: 2024-01-23 | Stop reason: HOSPADM

## 2024-01-23 RX ORDER — PHENYLEPHRINE HCL IN 0.9% NACL 1 MG/10 ML
SYRINGE (ML) INTRAVENOUS AS NEEDED
Status: DISCONTINUED | OUTPATIENT
Start: 2024-01-23 | End: 2024-01-23

## 2024-01-23 RX ORDER — LIDOCAINE HYDROCHLORIDE 10 MG/ML
0.5 INJECTION, SOLUTION EPIDURAL; INFILTRATION; INTRACAUDAL; PERINEURAL ONCE AS NEEDED
Status: DISCONTINUED | OUTPATIENT
Start: 2024-01-23 | End: 2024-01-23 | Stop reason: HOSPADM

## 2024-01-23 RX ORDER — EPHEDRINE SULFATE 50 MG/ML
INJECTION INTRAVENOUS AS NEEDED
Status: DISCONTINUED | OUTPATIENT
Start: 2024-01-23 | End: 2024-01-23

## 2024-01-23 RX ORDER — MIDAZOLAM HYDROCHLORIDE 2 MG/2ML
INJECTION, SOLUTION INTRAMUSCULAR; INTRAVENOUS AS NEEDED
Status: DISCONTINUED | OUTPATIENT
Start: 2024-01-23 | End: 2024-01-23

## 2024-01-23 RX ORDER — ONDANSETRON 2 MG/ML
4 INJECTION INTRAMUSCULAR; INTRAVENOUS EVERY 4 HOURS PRN
Status: DISCONTINUED | OUTPATIENT
Start: 2024-01-23 | End: 2024-01-23 | Stop reason: HOSPADM

## 2024-01-23 RX ORDER — HYDROCODONE BITARTRATE AND ACETAMINOPHEN 5; 325 MG/1; MG/1
1 TABLET ORAL EVERY 4 HOURS PRN
Qty: 5 TABLET | Refills: 0 | Status: SHIPPED | OUTPATIENT
Start: 2024-01-23 | End: 2024-01-25

## 2024-01-23 RX ORDER — MAGNESIUM HYDROXIDE 1200 MG/15ML
LIQUID ORAL AS NEEDED
Status: DISCONTINUED | OUTPATIENT
Start: 2024-01-23 | End: 2024-01-23 | Stop reason: HOSPADM

## 2024-01-23 RX ORDER — DEXAMETHASONE SODIUM PHOSPHATE 10 MG/ML
INJECTION, SOLUTION INTRAMUSCULAR; INTRAVENOUS AS NEEDED
Status: DISCONTINUED | OUTPATIENT
Start: 2024-01-23 | End: 2024-01-23

## 2024-01-23 RX ORDER — PROPOFOL 10 MG/ML
INJECTION, EMULSION INTRAVENOUS AS NEEDED
Status: DISCONTINUED | OUTPATIENT
Start: 2024-01-23 | End: 2024-01-23

## 2024-01-23 RX ORDER — SODIUM CHLORIDE, SODIUM LACTATE, POTASSIUM CHLORIDE, CALCIUM CHLORIDE 600; 310; 30; 20 MG/100ML; MG/100ML; MG/100ML; MG/100ML
125 INJECTION, SOLUTION INTRAVENOUS CONTINUOUS
Status: DISCONTINUED | OUTPATIENT
Start: 2024-01-23 | End: 2024-01-23 | Stop reason: HOSPADM

## 2024-01-23 RX ORDER — LIDOCAINE HCL/PF 100 MG/5ML
SYRINGE (ML) INJECTION AS NEEDED
Status: DISCONTINUED | OUTPATIENT
Start: 2024-01-23 | End: 2024-01-23

## 2024-01-23 RX ORDER — ONDANSETRON 2 MG/ML
INJECTION INTRAMUSCULAR; INTRAVENOUS AS NEEDED
Status: DISCONTINUED | OUTPATIENT
Start: 2024-01-23 | End: 2024-01-23

## 2024-01-23 RX ADMIN — PROPOFOL 120 MG: 10 INJECTION, EMULSION INTRAVENOUS at 08:53

## 2024-01-23 RX ADMIN — Medication 100 MCG: at 09:04

## 2024-01-23 RX ADMIN — EPHEDRINE SULFATE 10 MG: 50 INJECTION, SOLUTION INTRAVENOUS at 09:41

## 2024-01-23 RX ADMIN — LIDOCAINE HYDROCHLORIDE 40 MG: 20 INJECTION INTRAVENOUS at 08:53

## 2024-01-23 RX ADMIN — CEFTRIAXONE SODIUM 1000 MG: 10 INJECTION, POWDER, FOR SOLUTION INTRAVENOUS at 08:47

## 2024-01-23 RX ADMIN — DEXAMETHASONE SODIUM PHOSPHATE 10 MG: 10 INJECTION, SOLUTION INTRAMUSCULAR; INTRAVENOUS at 08:58

## 2024-01-23 RX ADMIN — MIDAZOLAM 2 MG: 1 INJECTION INTRAMUSCULAR; INTRAVENOUS at 08:47

## 2024-01-23 RX ADMIN — FENTANYL CITRATE 50 MCG: 50 INJECTION INTRAMUSCULAR; INTRAVENOUS at 08:53

## 2024-01-23 RX ADMIN — ONDANSETRON 4 MG: 2 INJECTION INTRAMUSCULAR; INTRAVENOUS at 08:58

## 2024-01-23 RX ADMIN — Medication 200 MCG: at 08:58

## 2024-01-23 RX ADMIN — EPHEDRINE SULFATE 10 MG: 50 INJECTION, SOLUTION INTRAVENOUS at 09:10

## 2024-01-23 RX ADMIN — FENTANYL CITRATE 50 MCG: 50 INJECTION INTRAMUSCULAR; INTRAVENOUS at 08:45

## 2024-01-23 RX ADMIN — EPHEDRINE SULFATE 20 MG: 50 INJECTION, SOLUTION INTRAVENOUS at 09:13

## 2024-01-23 RX ADMIN — SODIUM CHLORIDE, SODIUM LACTATE, POTASSIUM CHLORIDE, AND CALCIUM CHLORIDE 125 ML/HR: .6; .31; .03; .02 INJECTION, SOLUTION INTRAVENOUS at 07:49

## 2024-01-23 NOTE — ANESTHESIA POSTPROCEDURE EVALUATION
Post-Op Assessment Note    CV Status:  Stable    Pain management: adequate       Mental Status:  Alert and awake   Hydration Status:  Euvolemic   PONV Controlled:  Controlled   Airway Patency:  Patent     Post Op Vitals Reviewed: Yes      Staff: CRNA, Anesthesiologist               BP   125/98   Temp 98.3   Pulse 98   Resp 13   SpO2 100

## 2024-01-23 NOTE — ANESTHESIA POSTPROCEDURE EVALUATION
Post-Op Assessment Note            No anethesia notable event occurred.    Staff: Anesthesiologist         Chest Pressure in PACU. Not heartburn type pain. No cardiac Hx. Stable hemodynamics. Labs, EKG , Cardiology consult ordered.      /67 (01/23/24 1145)    Temp      Pulse 78 (01/23/24 1145)   Resp 17 (01/23/24 1145)    SpO2 99 % (01/23/24 1145)

## 2024-01-23 NOTE — INTERVAL H&P NOTE
H&P reviewed. After examining the patient I find no changes in the patients condition since the H&P had been written.    Vitals:    01/23/24 0705   BP: 120/84   Pulse: 81   Resp: 18   Temp: 97.7 °F (36.5 °C)   SpO2: 98%

## 2024-01-23 NOTE — CONSULTS
Reason for Consult / Principal Problem:cp    Physician Requesting Consult:  Marshall Jaime MD    Cardiologist: at [facility]      Assessment and Plan      Current Problem List   Active Problems:  There are no active Hospital Problems.    Assessment/Plan:    This is 64 yo female who had chest pain and heaviness for about 15 mins after her urology procedure. Pain was substernal and intense. Pain lasted for 15 mins and now completely resolved. Cardiology was consulted for this reason.       # Chest pain- likely related to her procedure and anesthesia. Has CAD risk factors. Now pain free. Trop x1 normal. No ischemic changes on EKG. No hx of MI or CAD. she is active at baseline and does take walks. Denies any chest pain, FATIMA or   SOB with walking outpatient.     # hx of renal stone- came in for cysto, laser and stent placement.   # hx of RA  # HLD  # HTN  # hx of suspected pericarditis in the past- follows at [facility] cardiology     Plan:    Pain likely related to her procedure/anesthesia. Pain is atypical and has now resolved. First trop negative. No ischemic changes on EKG.   Pt can be discharge once second troponin is normal.   She was asked to follow up with her [facility] cardiology.   Plan was communicated with urology team.              Subjective     CC: cp    HPI: Hailee Willis 63 y.o. year old female who presents for elective urologic procedure for her renal stone.  Patient underwent ureteroscopy, laser and stone extraction with stent placement.  After the procedure, patient had chest heaviness that lasted for about 15 minutes.  By the time I saw the patient, her symptoms completely resolved.  Patient follows with [facility] cardiology.  Patient denies any previous history of PCI or cardiac surgery.  No previous history of exertional dyspnea, shortness of breath or chest pain in outpatient setting.  Patient is active at baseline and does take daily walks without any symptoms.  Her EKG is nonischemic.  Her initial  troponin is normal.    On my evaluation, patient is alert and oriented x 3.  Currently denies any chest pain or heaviness.    Her risk factors includes hypertension, hyperlipidemia and previous history of pericarditis.    Family History: non-contributory  Historical Information   Past Medical History:   Diagnosis Date    Breast cancer (HCC)     Pt had in left breast- had radiation and surgery    COVID-19     pt had 2 times- ; 2022    CPAP (continuous positive airway pressure) dependence     Pt uses nightly    GERD (gastroesophageal reflux disease)     H/O cervical fracture     Hyperlipidemia     Irregular heart beat     Pt is taking Zetia. Pt does not have happen anymore per pt since on medication    Kidney stone     Liver disease     fatty liver    Pancreatitis     Pericarditis     Rheumatoid arthritis (HCC)     Seasonal allergies     Sleep apnea     Wears glasses      Past Surgical History:   Procedure Laterality Date    BLADDER SUSPENSION      BREAST LUMPECTOMY       SECTION      x2    COLONOSCOPY      EGD      FL RETROGRADE PYELOGRAM  2021    FOOT SURGERY Right     HYSTERECTOMY      IR NEPHROURETERAL ACCESS FOR UROLOGY PCNL  2023    IR NEPHROURETERAL STENT PLACEMENT Left     HI CYSTO BLADDER W/URETERAL CATHETERIZATION Left 2021    Procedure: CYSTOSCOPY RETROGRADE PYELOGRAM WITH INSERTION STENT URETERAL;  Surgeon: Donny Magana MD;  [facility]: [facility];  Service: Urology    HI CYSTO/URETERO W/LITHOTRIPSY &INDWELL STENT INSRT Left 2021    Procedure: CYSTOSCOPY URETEROSCOPY WITH LITHOTRIPSY HOLMIUM LASER,  AND INSERTION STENT URETERAL;  Surgeon: Jason Beckett MD;  Location: [facility];  Service: Urology    HI CYSTO/URETERO W/LITHOTRIPSY &INDWELL STENT INSRT Left 2022    Procedure: CYSTOSCOPY URETEROSCOPY WITH LITHOTRIPSY HOLMIUM LASER,  INSERTION STENT URETERAL, attempted removal of foreign body;  Surgeon: Jason Beckett MD;  Location: [facility];  Service:  "Urology    SINUS SURGERY      Deviated septum     Social History   Social History     Substance and Sexual Activity   Alcohol Use Yes    Comment: rarely, socially     Social History     Substance and Sexual Activity   Drug Use Not Currently     Social History     Tobacco Use   Smoking Status Former    Current packs/day: 0.00    Types: Cigarettes    Quit date: 2009    Years since quitting: 15.0   Smokeless Tobacco Never     Family History:   Family History   Problem Relation Age of Onset    Lung cancer Father     Heart disease Maternal Aunt     Heart disease Maternal Uncle     Kidney cancer Maternal Grandfather     Lung cancer Maternal Grandfather     Heart attack Brother        Review of Systems:  Review of Systems   Constitutional:  Negative for activity change, appetite change, diaphoresis, fatigue and fever.   HENT:  Negative for congestion, sinus pressure, sinus pain and sore throat.    Respiratory:  Positive for chest tightness. Negative for apnea, shortness of breath and stridor.    Cardiovascular:  Negative for chest pain, palpitations and leg swelling.   Gastrointestinal:  Negative for abdominal pain, constipation and diarrhea.           Scheduled Meds:  Current Facility-Administered Medications   Medication Dose Route Frequency Provider Last Rate    cefTRIAXone  1,000 mg Intravenous Q24H Marshall Jaime MD      HYDROcodone-acetaminophen  1 tablet Oral Q6H PRN Marshall Jaime MD      lactated ringers  125 mL/hr Intravenous Continuous Angelo José  mL/hr (01/23/24 0749)    lidocaine (PF)  0.5 mL Infiltration Once PRN Angelo José MD       Continuous Infusions:lactated ringers, 125 mL/hr, Last Rate: 125 mL/hr (01/23/24 0749)      PRN Meds:.  HYDROcodone-acetaminophen    lidocaine (PF)  all current active meds have been reviewed    Allergies   Allergen Reactions    Azathioprine Swelling     Pt reports face gets \"puffy\", red and face feels hot.    Ciprofloxacin Hives     ? Rash      " "Amoxicillin-Pot Clavulanate Hives    Celecoxib Rash    Colchicine Rash    Penicillins Rash       Objective   Vitals: Temp (24hrs), Av °F (36.7 °C), Min:97.7 °F (36.5 °C), Max:98.3 °F (36.8 °C)  Current: Temperature: 97.9 °F (36.6 °C)  Patient Vitals for the past 24 hrs:   BP Temp Temp src Pulse Resp SpO2 Height Weight   24 1206 111/72 97.9 °F (36.6 °C) Temporal 86 16 97 % -- --   24 1145 105/67 -- -- 78 17 99 % -- --   24 1130 109/57 -- -- 78 18 99 % -- --   24 1115 101/69 -- -- 82 14 100 % -- --   24 1100 129/70 -- -- (!) 120 (!) 42 96 % -- --   24 1045 109/65 -- -- 92 20 94 % -- --   24 1030 136/67 -- -- 100 17 98 % -- --   24 1027 125/78 98.3 °F (36.8 °C) -- 98 20 98 % -- --   24 0728 -- -- -- -- -- -- 5' 4\" (1.626 m) 84.8 kg (187 lb)   24 0705 120/84 97.7 °F (36.5 °C) Temporal 81 18 98 % -- --    Body mass index is 32.1 kg/m².  Orthostatic Blood Pressures      Flowsheet Row Most Recent Value   Blood Pressure 111/72 filed at 2024 1206                Invasive Devices       Peripheral Intravenous Line  Duration             Peripheral IV 23 Right Antecubital 302 days    Peripheral IV 24 Right Hand <1 day              Drain  Duration             Ureteral Internal Stent Left ureter 6 Fr. 398 days    Nephrostomy Left 10.2 Fr. 358 days                    Physical Exam:  Physical Exam  Vitals reviewed.   Constitutional:       General: She is not in acute distress.     Appearance: She is well-developed. She is not diaphoretic.   Cardiovascular:      Rate and Rhythm: Normal rate and regular rhythm.      Heart sounds: Normal heart sounds. No murmur heard.     No friction rub.   Pulmonary:      Effort: Pulmonary effort is normal. No respiratory distress.      Breath sounds: Normal breath sounds. No stridor.   Abdominal:      General: Bowel sounds are normal. There is no distension.      Palpations: Abdomen is soft.      Tenderness: There is no " "abdominal tenderness. There is no rebound.   Psychiatric:         Behavior: Behavior normal.         Thought Content: Thought content normal.             Lab Results:   Results from last 7 days   Lab Units 01/23/24  1109   WBC Thousand/uL 5.88   HEMOGLOBIN g/dL 12.4   HEMATOCRIT % 36.9   PLATELETS Thousands/uL 307      Results from last 7 days   Lab Units 01/23/24  1109   SODIUM mmol/L 138   POTASSIUM mmol/L 4.1   CHLORIDE mmol/L 103   CO2 mmol/L 26   BUN mg/dL 20   CREATININE mg/dL 1.20   CALCIUM mg/dL 9.4   EGFR ml/min/1.73sq m 48                 No results found for: \"PHART\", \"WWO6JKO\", \"PO2ART\", \"NJQ6GIP\", \"K8SSLTVP\", \"BEART\", \"SOURCE\"  No components found for: \"HIV1X2\"  Lab Results   Component Value Date    HEPBIGM Non-reactive 12/03/2022    HEPBCAB Non-reactive 12/03/2022    HEPCAB Non-reactive 12/03/2022     No results found for: \"SPEP\", \"UPEP\"   Lab Results   Component Value Date    HGBA1C 5.5 02/21/2022     No results found for: \"CHOL\"   Lab Results   Component Value Date    HDL 67 02/21/2022      Lab Results   Component Value Date    LDLCALC 149 (H) 02/21/2022      Lab Results   Component Value Date    TRIG 244 (H) 02/21/2022     No components found for: \"PROCAL\"          Imaging: I have personally reviewed pertinent reports.                 "

## 2024-01-23 NOTE — OP NOTE
OPERATIVE REPORT  PATIENT NAME: Hailee Willis    :  1960  MRN: [id number]  Pt Location: [facility] 01    SURGERY DATE: 2024    Surgeons and Role:     * Marshall Jaime MD - Primary    Preop Diagnosis:  Nephrolithiasis [N20.0]  Staghorn calculus [N20.0]    Post-Op Diagnosis Codes:     * Nephrolithiasis [N20.0]     * Staghorn calculus [N20.0]    Procedure(s):  Left - CYSTOSCOPY URETEROSCOPY WITH.BALLOON DILATION .  LITHOTRIPSY HOLMIUM LASER. RETROGRADE PYELOGRAM AND INSERTION STENT URETERAL    Specimen(s):  * No specimens in log *    Estimated Blood Loss:   Minimal    Drains:  Nephrostomy Left 10.2 Fr. (Active)   Number of days: 358       Ureteral Internal Stent Left ureter 6 Fr. (Active)   Number of days: 398       Anesthesia Type:   General    Operative Indications:  Nephrolithiasis [N20.0]  Staghorn calculus [N20.0]      Operative Findings:  Tight ureteral orifice requiring balloon dilation.  Atypical renal anatomy with multiple infundibular stenoses and calices at acute angles making direct approach to stones very difficult or impossible.    Complications:   None    Procedure and Technique:  Patient was identified, brought to the operating, placed on table in supine position.  After induction of general esthesia she was placed in dorsolithotomy position prepped draped in usual sterile fashion.  A formal timeout was performed.    22 Turkmen rigid cystoscope introduced per urethra the bladder cystoscopy was performed.  There is no bladder abnormality noted.  Guidewire was passed into the left ureteral orifice and up into the left kidney.  A dual-lumen catheter was placed followed by passage of a second guidewire.  1 wire was placed to side as a safety wire.  Over the working wire a 10/12 Turkmen ureteral access sheath was passed.  This would not pass through the intramural ureter.  A ureteral dilation balloon was then passed over the working wire and under fluoroscopic guidance the ureteral  orifice and intramural ureter was dilated to 8 dallin of pressure.  At this point the dilator was removed and the 10/12 access sheath was able to be passed to the proximal ureter.  The single use flexible ureteroscope was then passed through the access sheath and into the left kidney.  Full renoscopy was performed.   Multiple infundibular stenoses were identified including the upper pole and lower pole entry.  Stones were identified in the upper pole with significant flexing of the scope.  A 272 µm holmium laser fiber was placed and laser lithotripsy was performed of the stones until dusted.  I then addressed the lower pole.  The tip of the larger lower pole calculus was visible.  This could be reached with the scope and laser fiber and laser lithotripsy was performed.  However I was not able to fully treat the entire stone in this manner due to the need for significant over flexion.  I changed to a 200 µm holmium laser fiber in hopes that it would allow further flexion and it did somewhat.  A further portion of the stone was shaved off however the entire stone could not be reached.  I attempted multiple different maneuvers for over 40 minutes attempt to reach different areas of the stone I was able to reach along the side of the stone on both sides for some degree of lithotripsy however the main body of the stone remained intact.  I then encountered additional stones that were not contiguous with this one in more peripheral calyces.  These were treated as able.  A third 200 µm holmium fiber was necessary to treat some of these as one of them broke due to the significant curvature of the scope.  After working on the stone for some time, it became clear that the entire stent would not be able to be treated in this manner unfortunately.  The safety wire was backloaded into the cystoscope and 5 Romanian catheter was passed measuring ureteral length.  Retrograde programs performed delineating the anatomy.  Stenotic  infundibula were identified clearly.  A 6 Sri Lankan by 26 cm double-J stent with string was placed.  A good coil was positioned into the renal pelvis and 1 in the bladder.  The string was knotted and cut short.  Patient tolerated this procedure well.   I was present for the entire procedure.    Patient Disposition:  PACU     Plan: Stent/string removal later this week.  Will need to review with patient and her  regarding additional next steps which may include continued observation or attempted percutaneous approach.  As there is no hydronephrosis, I think percutaneous access will be quite difficult and the infundibular stenoses will also make percutaneous stone manipulation difficult as it would likely still require significant flexion of the flexible cystoscope or flexible ureteroscope within the kidney itself through a percutaneous sheath.    SIGNATURE: Marshall Jaime MD  DATE: January 23, 2024  TIME: 10:38 AM

## 2024-01-24 ENCOUNTER — HOSPITAL ENCOUNTER (OUTPATIENT)
Facility: HOSPITAL | Age: 64
Setting detail: OBSERVATION
Discharge: HOME | End: 2024-01-25
Attending: EMERGENCY MEDICINE | Admitting: FAMILY MEDICINE
Payer: COMMERCIAL

## 2024-01-24 ENCOUNTER — APPOINTMENT (EMERGENCY)
Dept: CT IMAGING | Facility: HOSPITAL | Age: 64
End: 2024-01-24
Payer: COMMERCIAL

## 2024-01-24 DIAGNOSIS — N20.0 RENAL CALCULI: ICD-10-CM

## 2024-01-24 DIAGNOSIS — N20.0 KIDNEY STONE: ICD-10-CM

## 2024-01-24 DIAGNOSIS — R10.9 LEFT FLANK PAIN: Primary | ICD-10-CM

## 2024-01-24 LAB
ALBUMIN SERPL BCP-MCNC: 4.3 G/DL (ref 3.5–5)
ALP SERPL-CCNC: 92 U/L (ref 34–104)
ALT SERPL W P-5'-P-CCNC: 16 U/L (ref 7–52)
ANION GAP SERPL CALCULATED.3IONS-SCNC: 9 MMOL/L
AST SERPL W P-5'-P-CCNC: 16 U/L (ref 13–39)
BACTERIA UR QL AUTO: ABNORMAL /HPF
BASOPHILS # BLD AUTO: 0.03 THOUSANDS/ÂΜL (ref 0–0.1)
BASOPHILS NFR BLD AUTO: 0 % (ref 0–1)
BILIRUB SERPL-MCNC: 0.28 MG/DL (ref 0.2–1)
BILIRUB UR QL STRIP: NEGATIVE
BUN SERPL-MCNC: 23 MG/DL (ref 5–25)
CALCIUM SERPL-MCNC: 9.2 MG/DL (ref 8.4–10.2)
CHLORIDE SERPL-SCNC: 102 MMOL/L (ref 96–108)
CLARITY UR: CLEAR
CO2 SERPL-SCNC: 24 MMOL/L (ref 21–32)
COLOR UR: YELLOW
CREAT SERPL-MCNC: 1.26 MG/DL (ref 0.6–1.3)
EOSINOPHIL # BLD AUTO: 0.13 THOUSAND/ÂΜL (ref 0–0.61)
EOSINOPHIL NFR BLD AUTO: 1 % (ref 0–6)
ERYTHROCYTE [DISTWIDTH] IN BLOOD BY AUTOMATED COUNT: 13.4 % (ref 11.6–15.1)
GFR SERPL CREATININE-BSD FRML MDRD: 46 ML/MIN/1.73SQ M
GLUCOSE SERPL-MCNC: 107 MG/DL (ref 65–140)
GLUCOSE UR STRIP-MCNC: NEGATIVE MG/DL
HCT VFR BLD AUTO: 39.1 % (ref 34.8–46.1)
HGB BLD-MCNC: 12 G/DL (ref 11.5–15.4)
HGB UR QL STRIP.AUTO: ABNORMAL
IMM GRANULOCYTES # BLD AUTO: 0.07 THOUSAND/UL (ref 0–0.2)
IMM GRANULOCYTES NFR BLD AUTO: 1 % (ref 0–2)
KETONES UR STRIP-MCNC: NEGATIVE MG/DL
LEUKOCYTE ESTERASE UR QL STRIP: NEGATIVE
LYMPHOCYTES # BLD AUTO: 1.64 THOUSANDS/ÂΜL (ref 0.6–4.47)
LYMPHOCYTES NFR BLD AUTO: 12 % (ref 14–44)
MCH RBC QN AUTO: 27.5 PG (ref 26.8–34.3)
MCHC RBC AUTO-ENTMCNC: 30.7 G/DL (ref 31.4–37.4)
MCV RBC AUTO: 90 FL (ref 82–98)
MONOCYTES # BLD AUTO: 0.98 THOUSAND/ÂΜL (ref 0.17–1.22)
MONOCYTES NFR BLD AUTO: 7 % (ref 4–12)
NEUTROPHILS # BLD AUTO: 11.13 THOUSANDS/ÂΜL (ref 1.85–7.62)
NEUTS SEG NFR BLD AUTO: 79 % (ref 43–75)
NITRITE UR QL STRIP: NEGATIVE
NON-SQ EPI CELLS URNS QL MICRO: ABNORMAL /HPF
NRBC BLD AUTO-RTO: 0 /100 WBCS
PH UR STRIP.AUTO: 6.5 [PH]
PLATELET # BLD AUTO: 329 THOUSANDS/UL (ref 149–390)
PMV BLD AUTO: 10 FL (ref 8.9–12.7)
POTASSIUM SERPL-SCNC: 3.5 MMOL/L (ref 3.5–5.3)
PROT SERPL-MCNC: 7.7 G/DL (ref 6.4–8.4)
PROT UR STRIP-MCNC: ABNORMAL MG/DL
RBC # BLD AUTO: 4.36 MILLION/UL (ref 3.81–5.12)
RBC #/AREA URNS AUTO: ABNORMAL /HPF
SODIUM SERPL-SCNC: 135 MMOL/L (ref 135–147)
SP GR UR STRIP.AUTO: >=1.03 (ref 1–1.03)
UROBILINOGEN UR QL STRIP.AUTO: 0.2 E.U./DL
WBC # BLD AUTO: 13.98 THOUSAND/UL (ref 4.31–10.16)
WBC #/AREA URNS AUTO: ABNORMAL /HPF

## 2024-01-24 PROCEDURE — 99285 EMERGENCY DEPT VISIT HI MDM: CPT | Performed by: EMERGENCY MEDICINE

## 2024-01-24 PROCEDURE — 80053 COMPREHEN METABOLIC PANEL: CPT | Performed by: EMERGENCY MEDICINE

## 2024-01-24 PROCEDURE — 74176 CT ABD & PELVIS W/O CONTRAST: CPT

## 2024-01-24 PROCEDURE — 36415 COLL VENOUS BLD VENIPUNCTURE: CPT | Performed by: EMERGENCY MEDICINE

## 2024-01-24 PROCEDURE — 96374 THER/PROPH/DIAG INJ IV PUSH: CPT

## 2024-01-24 PROCEDURE — 99284 EMERGENCY DEPT VISIT MOD MDM: CPT

## 2024-01-24 PROCEDURE — 81001 URINALYSIS AUTO W/SCOPE: CPT | Performed by: EMERGENCY MEDICINE

## 2024-01-24 PROCEDURE — 85025 COMPLETE CBC W/AUTO DIFF WBC: CPT | Performed by: EMERGENCY MEDICINE

## 2024-01-24 PROCEDURE — G1004 CDSM NDSC: HCPCS

## 2024-01-24 PROCEDURE — 96375 TX/PRO/DX INJ NEW DRUG ADDON: CPT

## 2024-01-24 RX ORDER — SODIUM CHLORIDE, SODIUM LACTATE, POTASSIUM CHLORIDE, CALCIUM CHLORIDE 600; 310; 30; 20 MG/100ML; MG/100ML; MG/100ML; MG/100ML
150 INJECTION, SOLUTION INTRAVENOUS CONTINUOUS
Status: DISCONTINUED | OUTPATIENT
Start: 2024-01-24 | End: 2024-01-25

## 2024-01-24 RX ORDER — KETOROLAC TROMETHAMINE 30 MG/ML
15 INJECTION, SOLUTION INTRAMUSCULAR; INTRAVENOUS ONCE
Status: COMPLETED | OUTPATIENT
Start: 2024-01-24 | End: 2024-01-24

## 2024-01-24 RX ORDER — ONDANSETRON 2 MG/ML
4 INJECTION INTRAMUSCULAR; INTRAVENOUS ONCE
Status: COMPLETED | OUTPATIENT
Start: 2024-01-24 | End: 2024-01-24

## 2024-01-24 RX ADMIN — SODIUM CHLORIDE, SODIUM LACTATE, POTASSIUM CHLORIDE, AND CALCIUM CHLORIDE 150 ML/HR: .6; .31; .03; .02 INJECTION, SOLUTION INTRAVENOUS at 22:10

## 2024-01-24 RX ADMIN — ONDANSETRON 4 MG: 2 INJECTION INTRAMUSCULAR; INTRAVENOUS at 20:27

## 2024-01-24 RX ADMIN — KETOROLAC TROMETHAMINE 15 MG: 30 INJECTION, SOLUTION INTRAMUSCULAR; INTRAVENOUS at 20:28

## 2024-01-24 RX ADMIN — MORPHINE SULFATE 2 MG: 2 INJECTION, SOLUTION INTRAMUSCULAR; INTRAVENOUS at 20:27

## 2024-01-24 RX ADMIN — MORPHINE SULFATE 2 MG: 2 INJECTION, SOLUTION INTRAMUSCULAR; INTRAVENOUS at 22:05

## 2024-01-24 NOTE — TELEPHONE ENCOUNTER
Post Op Note    Hailee Willis is a 63 y.o. female s/p CYSTOSCOPY URETEROSCOPY WITH,BALLOON DILATION ,  LITHOTRIPSY HOLMIUM LASER, RETROGRADE PYELOGRAM AND INSERTION STENT URETERAL (Left: Ureter) performed 1/23/2024.  Hailee Willis had surgery by Dr. Jaime and is seen at the [facility].       Spoke to patient and she is doing well after her surgery yesterday. Advised of Dr. Jaime's recommendations of having the stent removed in 3-5 days. Advised if the patient would be comfortable removing the stent herself or she would like to come to the office. Patient advises she has had her previous stents removed by herself and is comfortable doing so. Patient will be removing her stent Friday morning and will contact her in the afternoon to see how she is doing. Advised of Dr. Jaime's recommendations of scheduling a follow up appointment to discuss next steps. Appointment has been scheduled with Dr. Jaime at the [facility] for February 9. Location provided. Advised to contact the office in the meantime with any questions or concerns.

## 2024-01-25 VITALS
SYSTOLIC BLOOD PRESSURE: 110 MMHG | TEMPERATURE: 97.5 F | BODY MASS INDEX: 34.63 KG/M2 | WEIGHT: 202.82 LBS | OXYGEN SATURATION: 95 % | RESPIRATION RATE: 18 BRPM | DIASTOLIC BLOOD PRESSURE: 63 MMHG | HEIGHT: 64 IN | HEART RATE: 87 BPM

## 2024-01-25 PROBLEM — I10 PRIMARY HYPERTENSION: Status: ACTIVE | Noted: 2024-01-25

## 2024-01-25 PROCEDURE — NC001 PR NO CHARGE: Performed by: UROLOGY

## 2024-01-25 PROCEDURE — 99223 1ST HOSP IP/OBS HIGH 75: CPT | Performed by: FAMILY MEDICINE

## 2024-01-25 PROCEDURE — 99239 HOSP IP/OBS DSCHRG MGMT >30: CPT | Performed by: FAMILY MEDICINE

## 2024-01-25 PROCEDURE — 99222 1ST HOSP IP/OBS MODERATE 55: CPT | Performed by: UROLOGY

## 2024-01-25 RX ORDER — SULFAMETHOXAZOLE AND TRIMETHOPRIM 800; 160 MG/1; MG/1
1 TABLET ORAL EVERY 12 HOURS SCHEDULED
Status: SHIPPED | OUTPATIENT
Start: 2024-01-25 | End: 2024-01-30

## 2024-01-25 RX ORDER — MORPHINE SULFATE 4 MG/ML
4 INJECTION, SOLUTION INTRAMUSCULAR; INTRAVENOUS EVERY 4 HOURS PRN
Status: DISCONTINUED | OUTPATIENT
Start: 2024-01-25 | End: 2024-01-25 | Stop reason: HOSPADM

## 2024-01-25 RX ORDER — CEFTRIAXONE 1 G/50ML
1000 INJECTION, SOLUTION INTRAVENOUS EVERY 24 HOURS
Status: DISCONTINUED | OUTPATIENT
Start: 2024-01-26 | End: 2024-01-25

## 2024-01-25 RX ORDER — EZETIMIBE 10 MG/1
10 TABLET ORAL DAILY
Status: DISCONTINUED | OUTPATIENT
Start: 2024-01-25 | End: 2024-01-25 | Stop reason: HOSPADM

## 2024-01-25 RX ORDER — OXYBUTYNIN CHLORIDE 5 MG/1
5 TABLET, EXTENDED RELEASE ORAL DAILY
Qty: 7 TABLET | Refills: 0 | Status: SHIPPED | OUTPATIENT
Start: 2024-01-25 | End: 2024-02-01

## 2024-01-25 RX ORDER — CEFTRIAXONE 1 G/50ML
1000 INJECTION, SOLUTION INTRAVENOUS ONCE
Status: COMPLETED | OUTPATIENT
Start: 2024-01-25 | End: 2024-01-25

## 2024-01-25 RX ORDER — PHENAZOPYRIDINE HYDROCHLORIDE 100 MG/1
100 TABLET, FILM COATED ORAL 3 TIMES DAILY PRN
Qty: 10 TABLET | Refills: 0 | Status: SHIPPED | OUTPATIENT
Start: 2024-01-25

## 2024-01-25 RX ORDER — SODIUM CHLORIDE 9 MG/ML
125 INJECTION, SOLUTION INTRAVENOUS CONTINUOUS
Status: DISCONTINUED | OUTPATIENT
Start: 2024-01-25 | End: 2024-01-25

## 2024-01-25 RX ORDER — ONDANSETRON 4 MG/1
4 TABLET, FILM COATED ORAL EVERY 8 HOURS PRN
Qty: 20 TABLET | Refills: 0 | Status: SHIPPED | OUTPATIENT
Start: 2024-01-25

## 2024-01-25 RX ORDER — ACETAMINOPHEN 325 MG/1
650 TABLET ORAL EVERY 6 HOURS PRN
Status: DISCONTINUED | OUTPATIENT
Start: 2024-01-25 | End: 2024-01-25 | Stop reason: HOSPADM

## 2024-01-25 RX ORDER — ACETAMINOPHEN 325 MG/1
650 TABLET ORAL EVERY 6 HOURS PRN
Start: 2024-01-25

## 2024-01-25 RX ORDER — ONDANSETRON 2 MG/ML
4 INJECTION INTRAMUSCULAR; INTRAVENOUS EVERY 8 HOURS PRN
Status: DISCONTINUED | OUTPATIENT
Start: 2024-01-25 | End: 2024-01-25 | Stop reason: HOSPADM

## 2024-01-25 RX ORDER — CEFTRIAXONE 1 G/50ML
1000 INJECTION, SOLUTION INTRAVENOUS ONCE
Status: DISCONTINUED | OUTPATIENT
Start: 2024-01-25 | End: 2024-01-25 | Stop reason: HOSPADM

## 2024-01-25 RX ADMIN — ACETAMINOPHEN 650 MG: 325 TABLET, FILM COATED ORAL at 06:39

## 2024-01-25 RX ADMIN — SODIUM CHLORIDE 125 ML/HR: 0.9 INJECTION, SOLUTION INTRAVENOUS at 03:21

## 2024-01-25 RX ADMIN — EZETIMIBE 10 MG: 10 TABLET ORAL at 08:36

## 2024-01-25 RX ADMIN — CEFTRIAXONE 1000 MG: 1 INJECTION, SOLUTION INTRAVENOUS at 02:32

## 2024-01-25 NOTE — ASSESSMENT & PLAN NOTE
S/p left ureteral stent placement 1/23, accidentally removed by patient  CT abdomen pelvis- 3 x 4 mm distal left ureteral calculus resulting in severe left hydroureteronephrosis with periureteral and perinephric stranding.Additional nonobstructing intrarenal calculi throughout the left kidney the largest measuring 7 mm in the lower pole and 5 mm in the upper pole. Stable nonobstructing right-sided intrarenal calculus measuring 2 mm.  IV hydration  Pain control  UA without pyuria or bacteriuria  seen by urology and continue conservative management increase fluid intake placed on Pyridium Ditropan and Bactrim DS 1 tablet twice daily for 5 days and then resume once daily as per her home regimen  outPatient follow-up with her primary urologist recommended in a few days

## 2024-01-25 NOTE — UTILIZATION REVIEW
Initial Clinical Review    Admission: Date/Time/Statement:   Admission Orders (From admission, onward)       Ordered        01/24/24 2147  Place in Observation  Once                          Orders Placed This Encounter   Procedures    Place in Observation     Standing Status:   Standing     Number of Occurrences:   1     Order Specific Question:   Level of Care     Answer:   Med Surg [16]     ED Arrival Information       Expected   -    Arrival   1/24/2024 19:35    Acuity   Urgent              Means of arrival   Walk-In    Escorted by   Spouse    Service   Hospitalist    Admission type   Emergency              Arrival complaint   abd pain             Chief Complaint   Patient presents with    Flank Pain     C/o left flank pain since 1800. Pt had surgery yesterday at [facility] for kidney stones and states stent got pulled out accidentally today, pain began after stent came out.        Initial Presentation: 63 y.o. female , presented to the ED @ [facility], from home via walk in with Spouse.   Admitted as Observation due to Hydronephrosis of left kidney.    PMH:   left breast cancer s/p radiation and surgery, PEARL on CPAP, GERD, hyperlipidemia, pancreatitis, pericarditis, rheumatoid arthritis, recurrent ureteral stones   Date: 01/24/2024    S/P left ureteral stent placed 1/23 at [facility] but unfortunately tonight the ureteral stent was accidentally removed.  CT abdomen pelvis reveals obstructing eft distal calculus without pyuria or bacteriuria.  Patient will be n.p.o. pending urology consultation.   IV flds.  Analgesia Control PRN.  IV ceftriaxone while NPO,    01/25/2024  Consult Uro:  Status post cystoscopy, left ureteroscopy laser lithotripsy of large left renal calculi and left stent placement, postop day #2 with accidental stent removal and distal left ureteral fragment 3 mm.   Plan:  Discussed with the patient this morning her options which included observation, in which I recommended since she  is nontoxic, urine not infected and her pain is well-controlled and all likelihood will pass the small distal fragment.   The other option included ureteroscopy and possible replacement of her left stent.   Patient agrees with my recommendation and would like to pass the stone.  She does have follow-up with Dr. Jaime in [place] on February 9 to discuss option for the remaining stones in the left renal unit.     Day 2: 01/25/2024    ED Triage Vitals   Temperature Pulse Respirations Blood Pressure SpO2   01/24/24 1942 01/24/24 1942 01/24/24 1942 01/24/24 1942 01/24/24 1942   (!) 97 °F (36.1 °C) 78 18 (!) 172/75 100 %      Temp Source Heart Rate Source Patient Position - Orthostatic VS BP Location FiO2 (%)   01/24/24 1942 01/24/24 1942 01/24/24 1942 01/24/24 1942 --   Temporal Monitor Lying Left arm       Pain Score       01/24/24 2028       10 - Worst Possible Pain          Wt Readings from Last 1 Encounters:   01/25/24 92 kg (202 lb 13.2 oz)     Additional Vital Signs:   Date/Time Temp Pulse Resp BP MAP (mmHg) SpO2 O2 Device Patient Position - Orthostatic VS   01/25/24 0845 -- -- -- -- -- -- None (Room air) --   01/25/24 08:31:14 97.5 °F (36.4 °C) 87 18 110/63 79 95 % -- --   01/24/24 2356 -- -- -- -- -- 95 % None (Room air) --   01/24/24 2243 97.5 °F (36.4 °C) 67 19 121/67 -- -- -- --   01/24/24 22:35:16 97.5 °F (36.4 °C) 67 -- 121/67 85 95 % -- --   01/24/24 2200 -- 68 18 124/67 90 96 % None (Room air) Lying   01/24/24 2115 -- 70 17 124/71 92 96 % None (Room air) Lying         Pertinent Labs/Diagnostic Test Results:   CT renal stone study abdomen pelvis wo contrast   Final Result by Jan Davenport MD (01/24 2109)   3 x 4 mm distal left ureteral calculus resulting in severe left hydroureteronephrosis with periureteral and perinephric stranding.      Additional nonobstructing intrarenal calculi throughout the left kidney the largest measuring 7 mm in the lower pole and 5 mm in the upper pole. Stable  nonobstructing right-sided intrarenal calculus measuring 2 mm.      Colonic diverticulosis without diverticulitis.        Results from last 7 days   Lab Units 01/24/24 2026 01/23/24  1109   WBC Thousand/uL 13.98* 5.88   HEMOGLOBIN g/dL 12.0 12.4   HEMATOCRIT % 39.1 36.9   PLATELETS Thousands/uL 329 307   NEUTROS ABS Thousands/µL 11.13*  --      Results from last 7 days   Lab Units 01/24/24 2026 01/23/24  1109   SODIUM mmol/L 135 138   POTASSIUM mmol/L 3.5 4.1   CHLORIDE mmol/L 102 103   CO2 mmol/L 24 26   ANION GAP mmol/L 9 9   BUN mg/dL 23 20   CREATININE mg/dL 1.26 1.20   EGFR ml/min/1.73sq m 46 48   CALCIUM mg/dL 9.2 9.4     Results from last 7 days   Lab Units 01/24/24 2026   AST U/L 16   ALT U/L 16   ALK PHOS U/L 92   TOTAL PROTEIN g/dL 7.7   ALBUMIN g/dL 4.3   TOTAL BILIRUBIN mg/dL 0.28     Results from last 7 days   Lab Units 01/24/24 2026 01/23/24  1109   GLUCOSE RANDOM mg/dL 107 120     Results from last 7 days   Lab Units 01/23/24  1309 01/23/24  1109   HS TNI 0HR ng/L  --  3   HS TNI 2HR ng/L <2  --    HSTNI D2 ng/L <-1  --      Results from last 7 days   Lab Units 01/24/24 2028   CLARITY UA  Clear   COLOR UA  Yellow   SPEC GRAV UA  >=1.030   PH UA  6.5   GLUCOSE UA mg/dl Negative   KETONES UA mg/dl Negative   BLOOD UA  Large*   PROTEIN UA mg/dl 100 (2+)*   NITRITE UA  Negative   BILIRUBIN UA  Negative   UROBILINOGEN UA E.U./dl 0.2   LEUKOCYTES UA  Negative   WBC UA /hpf 0-1   RBC UA /hpf 30-50*   BACTERIA UA /hpf None Seen   EPITHELIAL CELLS WET PREP /hpf Occasional     ED Treatment:   Medication Administration from 01/24/2024 1934 to 01/24/2024 2231         Date/Time Order Dose Route Action     01/24/2024 2027 EST ondansetron (ZOFRAN) injection 4 mg 4 mg Intravenous Given     01/24/2024 2027 EST morphine injection 2 mg 2 mg Intravenous Given     01/24/2024 2028 EST ketorolac (TORADOL) injection 15 mg 15 mg Intravenous Given     01/24/2024 2210 EST lactated ringers infusion 150 mL/hr Intravenous New  Bag     01/24/2024 2205 EST morphine injection 2 mg 2 mg Intravenous Given          Past Medical History:   Diagnosis Date    Breast cancer (HCC) 1998    Pt had in left breast- had radiation and surgery    COVID-19     pt had 2 times- 2020; 1/2022    CPAP (continuous positive airway pressure) dependence     Pt uses nightly    GERD (gastroesophageal reflux disease)     H/O cervical fracture     Hyperlipidemia     Irregular heart beat     Pt is taking Zetia. Pt does not have happen anymore per pt since on medication    Kidney stone     Liver disease     fatty liver    Pancreatitis     Pericarditis     Rheumatoid arthritis (HCC)     Seasonal allergies     Sleep apnea     Wears glasses      Present on Admission:   Hydronephrosis of left kidney      Admitting Diagnosis: Kidney stone [N20.0]  Renal calculi [N20.0]  Flank pain [R10.9]  Left flank pain [R10.9]  Age/Sex: 63 y.o. female  Admission Orders:  Regular diet  Up & OOB as tolerated  Daniel SCDs      Scheduled Medications:  [START ON 1/26/2024] cefTRIAXone, 1,000 mg, Intravenous, Q24H  ezetimibe, 10 mg, Oral, Daily      Continuous IV Infusions:  lactated ringers infusion  Rate: 150 mL/hr Dose: 150 mL/hr  Freq: Continuous Route: IV  Indications of Use: IV Hydration  Last Dose: Stopped (01/25/24 0251)  Start: 01/24/24 2145 End: 01/25/24 0242   sodium chloride 0.9 % infusion  Rate: 125 mL/hr Dose: 125 mL/hr  Freq: Continuous Route: IV  Indications of Use: IV Hydration  Last Dose: Stopped (01/25/24 1303)  Start: 01/25/24 0245 End: 01/25/24 0650     PRN Meds:  acetaminophen, 650 mg, Oral, Q6H PRN   X 1 dose 1/25  morphine injection, 2 mg, Intravenous, Q3H PRN  morphine injection, 4 mg, Intravenous, Q4H PRN  ondansetron, 4 mg, Intravenous, Q8H PRN        IP CONSULT TO UROLOGY    Network Utilization Review Department  ATTENTION: Please call with any questions or concerns to [phone] and carefully listen to the prompts so that you are directed to the right person. All  voicemails are confidential.   For Discharge needs, contact Care Management DC Support Team at [phone] opt. 2  Send all requests for admission clinical reviews, approved or denied determinations and any other requests to dedicated fax number below belonging to the [facility] where the patient is receiving treatment. List of dedicated fax numbers for the Facilities:  FACILITY NAME UR FAX NUMBER   ADMISSION DENIALS (Administrative/Medical Necessity) [phone]   DISCHARGE SUPPORT TEAM (NETWORK) [phone]   PARENT CHILD HEALTH (Maternity/NICU/Pediatrics) [phone]   [facility] [phone]   [facility] [phone]   [facility] [phone]   [facility] [phone]   [facility] [phone]   [facility] [phone]   [facility] [phone]   [facility] [phone]   [facility] [phone]   [facility] [phone]   [facility] [phone]

## 2024-01-25 NOTE — ASSESSMENT & PLAN NOTE
S/p left ureteral stent placement 1/23, accidentally removed by patient  CT abdomen pelvis- 3 x 4 mm distal left ureteral calculus resulting in severe left hydroureteronephrosis with periureteral and perinephric stranding.Additional nonobstructing intrarenal calculi throughout the left kidney the largest measuring 7 mm in the lower pole and 5 mm in the upper pole. Stable nonobstructing right-sided intrarenal calculus measuring 2 mm.  N.p.o. pending urology consultation  IV hydration  Pain control  Rx Bactrim in the outpatient setting, transition to ceftriaxone while n.p.o.  UA without pyuria or bacteriuria   Benign essential HTN

## 2024-01-25 NOTE — ED PROVIDER NOTES
"History  Chief Complaint   Patient presents with    Flank Pain     C/o left flank pain since 1800. Pt had surgery yesterday at [facility] for kidney stones and states stent got pulled out accidentally today, pain began after stent came out.      Patient is a 63-year-old female with a known history of bilateral intrarenal calculi who underwent CYSTOSCOPY URETEROSCOPY WITH,BALLOON DILATION ,  LITHOTRIPSY HOLMIUM LASER, RETROGRADE PYELOGRAM AND INSERTION STENT URETERAL yesterday at [facility].  Patient reports that she was doing well until about 2 hours prior to arrival when she inadvertently dislodged her intrauterine stent.  Since that time patient has had severe left-sided flank pain.  Denies hematuria.  Denies fever.  Reports decreased urinary output.  Pain radiates into her \"bladder.\"  Patient appears very uncomfortable at this time.      History provided by:  Patient and spouse  Flank Pain  Pain location:  L flank  Pain quality: aching, sharp and shooting    Pain radiates to:  Groin and LLQ  Pain severity:  Severe  Onset quality:  Sudden  Duration:  2 hours  Timing:  Constant  Progression:  Worsening  Chronicity:  New  Associated symptoms: no chest pain, no fever, no hematemesis, no hematochezia, no hematuria and no shortness of breath        Prior to Admission Medications   Prescriptions Last Dose Informant Patient Reported? Taking?   Ascorbic Acid (Vitamin C) 500 MG CAPS  Self Yes No   Sig: Take 1 capsule by mouth in the morning   Cholecalciferol 50 MCG (2000 UT) CAPS  Self Yes No   Sig: Take 1 capsule by mouth daily 1000 units daily   Ergocalciferol 50 MCG (2000 UT) CAPS  Self Yes No   Sig: Take by mouth   Patient not taking: Reported on 11/29/2023   HYDROcodone-acetaminophen (NORCO) 5-325 mg per tablet   No No   Sig: Take 1 tablet by mouth every 4 (four) hours as needed for pain for up to 2 days Max Daily Amount: 6 tablets   Multiple Vitamins-Minerals (Multivitamin Gummies Womens) CHEW  Self Yes No   Sig: " Chew 2 each daily   amLODIPine (NORVASC) 2.5 mg tablet  Self No No   Sig: Take 1 tablet (2.5 mg total) by mouth daily   atorvastatin (LIPITOR) 40 mg tablet  Self Yes No   Sig: Take 40 mg by mouth   chlorthalidone 25 mg tablet  Self No No   Sig: Take 0.5 tablets (12.5 mg total) by mouth every other day   cyanocobalamin (VITAMIN B-12) 1000 MCG tablet  Self Yes No   Sig: Take 1,000 mcg by mouth daily   ezetimibe (ZETIA) 10 mg tablet  Self Yes No   Sig: Take 10 mg by mouth daily    fluticasone (FLONASE) 50 mcg/act nasal spray  Self Yes No   Si spray into each nostril as needed    leflunomide (ARAVA) 20 MG tablet  Self Yes No   Sig: Take 20 mg by mouth daily   meclizine (ANTIVERT) 25 mg tablet  Self No No   Sig: Take 1 tablet (25 mg total) by mouth 3 (three) times a day as needed for dizziness   Patient not taking: Reported on 2024   montelukast (SINGULAIR) 10 mg tablet  Self Yes No   Sig: Take 10 mg by mouth daily   pantoprazole (PROTONIX) 40 mg tablet  Self Yes No   Sig: Take 40 mg by mouth daily   potassium chloride (MICRO-K) 10 MEQ CR capsule  Self No No   Sig: Take 1 capsule (10 mEq total) by mouth daily   Patient not taking: Reported on 2024   rosuvastatin (CRESTOR) 5 mg tablet  Self Yes No   Sig: Take 5 mg by mouth daily   sodium chloride, PF, 0.9 %  Self No No   Sig: 10 mL by Intracatheter route daily Intracatheter flushing daily. May substitute prefilled syringe with normal saline 10 mL vials, 10 mL syringes, and 18 g blunt needles   Patient not taking: Reported on 2024   sulfamethoxazole-trimethoprim (BACTRIM DS) 800-160 mg per tablet  Self No No   Sig: Take 1 tablet by mouth daily      Facility-Administered Medications: None       Past Medical History:   Diagnosis Date    Breast cancer (HCC)     Pt had in left breast- had radiation and surgery    COVID-19     pt had 2 times- ; 2022    CPAP (continuous positive airway pressure) dependence     Pt uses nightly    GERD  (gastroesophageal reflux disease)     H/O cervical fracture     Hyperlipidemia     Irregular heart beat     Pt is taking Zetia. Pt does not have happen anymore per pt since on medication    Kidney stone     Liver disease     fatty liver    Pancreatitis     Pericarditis     Rheumatoid arthritis (HCC)     Seasonal allergies     Sleep apnea     Wears glasses        Past Surgical History:   Procedure Laterality Date    BLADDER SUSPENSION      BREAST LUMPECTOMY       SECTION      x2    COLONOSCOPY      EGD      FL RETROGRADE PYELOGRAM  2021    FOOT SURGERY Right     HYSTERECTOMY      IR NEPHROURETERAL ACCESS FOR UROLOGY PCNL  2023    IR NEPHROURETERAL STENT PLACEMENT Left     WY CYSTO BLADDER W/URETERAL CATHETERIZATION Left 2021    Procedure: CYSTOSCOPY RETROGRADE PYELOGRAM WITH INSERTION STENT URETERAL;  Surgeon: Donny Magana MD;  [facility];  Service: Urology    WY CYSTO/URETERO W/LITHOTRIPSY &INDWELL STENT INSRT Left 2021    Procedure: CYSTOSCOPY URETEROSCOPY WITH LITHOTRIPSY HOLMIUM LASER,  AND INSERTION STENT URETERAL;  Surgeon: Jason Beckett MD;  Location: [facility];  Service: Urology    WY CYSTO/URETERO W/LITHOTRIPSY &INDWELL STENT INSRT Left 2022    Procedure: CYSTOSCOPY URETEROSCOPY WITH LITHOTRIPSY HOLMIUM LASER,  INSERTION STENT URETERAL, attempted removal of foreign body;  Surgeon: Jason Beckett MD;  Location[facility];  Service: Urology    WY CYSTO/URETERO W/LITHOTRIPSY &INDWELL STENT INSRT Left 2024    Procedure: CYSTOSCOPY URETEROSCOPY WITH,BALLOON DILATION ,  LITHOTRIPSY HOLMIUM LASER, RETROGRADE PYELOGRAM AND INSERTION STENT URETERAL;  Surgeon: Marshall Jaime MD;  Location: [facility];  Service: Urology    SINUS SURGERY      Deviated septum       Family History   Problem Relation Age of Onset    Lung cancer Father     Heart disease Maternal Aunt     Heart disease Maternal Uncle     Kidney cancer Maternal Grandfather     Lung  cancer Maternal Grandfather     Heart attack Brother      I have reviewed and agree with the history as documented.    E-Cigarette/Vaping    E-Cigarette Use Never User      E-Cigarette/Vaping Substances    Nicotine No     THC No     CBD No     Flavoring No     Other No     Unknown No      Social History     Tobacco Use    Smoking status: Former     Current packs/day: 0.00     Types: Cigarettes     Quit date: 2009     Years since quitting: 15.0    Smokeless tobacco: Never   Vaping Use    Vaping status: Never Used   Substance Use Topics    Alcohol use: Yes     Comment: rarely, socially    Drug use: Not Currently       Review of Systems   Unable to perform ROS: Acuity of condition   Constitutional: Negative.  Negative for fever.   Respiratory: Negative.  Negative for chest tightness, shortness of breath and wheezing.    Cardiovascular:  Negative for chest pain and palpitations.   Gastrointestinal:  Positive for abdominal pain. Negative for hematemesis and hematochezia.   Genitourinary:  Positive for flank pain. Negative for hematuria.   All other systems reviewed and are negative.      Physical Exam  Physical Exam  Vitals and nursing note reviewed.   Constitutional:       General: She is in acute distress.      Appearance: She is normal weight. She is ill-appearing. She is not toxic-appearing.   HENT:      Head: Normocephalic and atraumatic.      Right Ear: External ear normal.      Left Ear: External ear normal.      Nose: Nose normal.      Mouth/Throat:      Mouth: Mucous membranes are moist.   Pulmonary:      Effort: Pulmonary effort is normal. No respiratory distress.   Abdominal:      General: Abdomen is flat. There is no distension.      Tenderness: There is left CVA tenderness.   Musculoskeletal:         General: No signs of injury.   Skin:     Coloration: Skin is not pale.   Neurological:      General: No focal deficit present.      Mental Status: She is alert.   Psychiatric:         Mood and Affect: Mood  normal.         Thought Content: Thought content normal.         Judgment: Judgment normal.         Vital Signs  ED Triage Vitals   Temperature Pulse Respirations Blood Pressure SpO2   01/24/24 1942 01/24/24 1942 01/24/24 1942 01/24/24 1942 01/24/24 1942   (!) 97 °F (36.1 °C) 78 18 (!) 172/75 100 %      Temp Source Heart Rate Source Patient Position - Orthostatic VS BP Location FiO2 (%)   01/24/24 1942 01/24/24 1942 01/24/24 1942 01/24/24 1942 --   Temporal Monitor Lying Left arm       Pain Score       01/24/24 2028       10 - Worst Possible Pain           Vitals:    01/24/24 1942 01/24/24 2115   BP: (!) 172/75 124/71   Pulse: 78 70   Patient Position - Orthostatic VS: Lying Lying         Visual Acuity      ED Medications  Medications   lactated ringers infusion (has no administration in time range)   morphine injection 2 mg (has no administration in time range)   ondansetron (ZOFRAN) injection 4 mg (4 mg Intravenous Given 1/24/24 2027)   morphine injection 2 mg (2 mg Intravenous Given 1/24/24 2027)   ketorolac (TORADOL) injection 15 mg (15 mg Intravenous Given 1/24/24 2028)       Diagnostic Studies  Results Reviewed       Procedure Component Value Units Date/Time    Urine Microscopic [[id number]]  (Abnormal) Collected: 01/24/24 2028    Lab Status: Final result Specimen: Urine, Clean Catch Updated: 01/24/24 2057     RBC, UA 30-50 /hpf      WBC, UA 0-1 /hpf      Epithelial Cells Occasional /hpf      Bacteria, UA None Seen /hpf     Comprehensive metabolic panel [[id number]] Collected: 01/24/24 2026    Lab Status: Final result Specimen: Blood from Arm, Right Updated: 01/24/24 2054     Sodium 135 mmol/L      Potassium 3.5 mmol/L      Chloride 102 mmol/L      CO2 24 mmol/L      ANION GAP 9 mmol/L      BUN 23 mg/dL      Creatinine 1.26 mg/dL      Glucose 107 mg/dL      Calcium 9.2 mg/dL      AST 16 U/L      ALT 16 U/L      Alkaline Phosphatase 92 U/L      Total Protein 7.7 g/dL      Albumin 4.3 g/dL      Total Bilirubin  0.28 mg/dL      eGFR 46 ml/min/1.73sq m     Narrative:      National Kidney Disease Foundation guidelines for Chronic Kidney Disease (CKD):     Stage 1 with normal or high GFR (GFR > 90 mL/min/1.73 square meters)    Stage 2 Mild CKD (GFR = 60-89 mL/min/1.73 square meters)    Stage 3A Moderate CKD (GFR = 45-59 mL/min/1.73 square meters)    Stage 3B Moderate CKD (GFR = 30-44 mL/min/1.73 square meters)    Stage 4 Severe CKD (GFR = 15-29 mL/min/1.73 square meters)    Stage 5 End Stage CKD (GFR <15 mL/min/1.73 square meters)  Note: GFR calculation is accurate only with a steady state creatinine    UA w Reflex to Microscopic w Reflex to Culture [[id number]]  (Abnormal) Collected: 01/24/24 2028    Lab Status: Final result Specimen: Urine, Clean Catch Updated: 01/24/24 2041     Color, UA Yellow     Clarity, UA Clear     Specific Gravity, UA >=1.030     pH, UA 6.5     Leukocytes, UA Negative     Nitrite, UA Negative     Protein,  (2+) mg/dl      Glucose, UA Negative mg/dl      Ketones, UA Negative mg/dl      Urobilinogen, UA 0.2 E.U./dl      Bilirubin, UA Negative     Occult Blood, UA Large    CBC and differential [[id number]]  (Abnormal) Collected: 01/24/24 2026    Lab Status: Final result Specimen: Blood from Arm, Right Updated: 01/24/24 2035     WBC 13.98 Thousand/uL      RBC 4.36 Million/uL      Hemoglobin 12.0 g/dL      Hematocrit 39.1 %      MCV 90 fL      MCH 27.5 pg      MCHC 30.7 g/dL      RDW 13.4 %      MPV 10.0 fL      Platelets 329 Thousands/uL      nRBC 0 /100 WBCs      Neutrophils Relative 79 %      Immat GRANS % 1 %      Lymphocytes Relative 12 %      Monocytes Relative 7 %      Eosinophils Relative 1 %      Basophils Relative 0 %      Neutrophils Absolute 11.13 Thousands/µL      Immature Grans Absolute 0.07 Thousand/uL      Lymphocytes Absolute 1.64 Thousands/µL      Monocytes Absolute 0.98 Thousand/µL      Eosinophils Absolute 0.13 Thousand/µL      Basophils Absolute 0.03 Thousands/µL                     CT renal stone study abdomen pelvis wo contrast   Final Result by Jan Davenport MD (01/24 2109)      3 x 4 mm distal left ureteral calculus resulting in severe left hydroureteronephrosis with periureteral and perinephric stranding.      Additional nonobstructing intrarenal calculi throughout the left kidney the largest measuring 7 mm in the lower pole and 5 mm in the upper pole. Stable nonobstructing right-sided intrarenal calculus measuring 2 mm.      Colonic diverticulosis without diverticulitis.            Workstation performed: [id number]                    Procedures  Procedures         ED Course  ED Course as of 01/24/24 2156 Wed Jan 24, 2024 2125 3 x 4 left distal ureteral calculus causing severe hydronephrosis.  Multiple intrarenal calculi also noted.  Will discuss with urology.   2139 D/w Urology. Admit here to be seen in AM by urology                               SBIRT 20yo+      Flowsheet Row Most Recent Value   Initial Alcohol Screen: US AUDIT-C     1. How often do you have a drink containing alcohol? 0 Filed at: 01/24/2024 2113   2. How many drinks containing alcohol do you have on a typical day you are drinking?  0 Filed at: 01/24/2024 2113   3b. FEMALE Any Age, or MALE 65+: How often do you have 4 or more drinks on one occassion? 0 Filed at: 01/24/2024 2113   Audit-C Score 0 Filed at: 01/24/2024 2113   GEORGE: How many times in the past year have you...    Used an illegal drug or used a prescription medication for non-medical reasons? Never Filed at: 01/24/2024 2113                      Medical Decision Making  Patient presented to the emergency department and a MSE was performed. The patient was evaluated for complaint related to left flank pain patient is potentially at risk for, but not limited to,  acute kidney stone, hematuria, cystitis, pyelonephritis, ureteral tear. Several of these diagnoses have been evaluated and ruled out by history and physical.  As needed, patient will be  further evaluated with laboratory and imaging studies.  Higher level diagnostics, such as CT imaging or ultrasound, may also be required.  Please see work-up portion of the note for further evaluation of patient's risk.  Socioeconomic factors were also considered as part of the decision-making process.  Unless otherwise stated in the chart or patient is admitted as elsewhere documented, any previously prescribed medications will be maintained.      Problems Addressed:  Kidney stone: chronic illness or injury with exacerbation, progression, or side effects of treatment  Left flank pain: acute illness or injury  Renal calculi: acute illness or injury    Amount and/or Complexity of Data Reviewed  Labs: ordered.  Radiology: ordered.  Discussion of management or test interpretation with external provider(s): Case was discussed with urology and medicine for admission.    Risk  Prescription drug management.  Decision regarding hospitalization.  Risk Details: Patient presented to the emergency department and a MSE was performed. The patient was evaluated and diagnosed with acute exacerbation of left flank pain related to kidney stone and dislodged stent.This is an acute exacerbation of a chronic disease process that will require additional planned work-up and treatment in a hospitalized setting. As may have been required as part of this evaluation, clinical laboratory test, radiology imaging and medical testing (I.e. EKG) were ordered as necessitated by the patient's presentation. I independently reviewed these studies, imaging and testing. This patient's case is considered to be a considerable risk secondary to the above listed disease process and poses a threat to the patient's well-being and baseline function. Further in-patient diagnostic testing and management, which may include the administration of parenteral medications, is required.                        Disposition  Final diagnoses:   Left flank pain   Kidney  stone   Renal calculi     Time reflects when diagnosis was documented in both MDM as applicable and the Disposition within this note       Time User Action Codes Description Comment    1/24/2024  9:38 PM Bruce Cunha [R10.9] Left flank pain     1/24/2024  9:38 PM Bruce Cunha [N20.0] Kidney stone     1/24/2024  9:46 PM Bruce Cunha [N20.0] Renal calculi           ED Disposition       ED Disposition   Admit    Condition   Stable    Date/Time   Wed Jan 24, 2024 2145    Comment                  Follow-up Information    None         Patient's Medications   Discharge Prescriptions    No medications on file       No discharge procedures on file.    PDMP Review         Value Time User    PDMP Reviewed  Yes 2/21/2022 10:53 AM Shlomo Stroud MD            ED Provider  Electronically Signed by             Bruce Cunha DO  01/24/24 7891

## 2024-01-25 NOTE — PLAN OF CARE
Problem: PAIN - ADULT  Goal: Verbalizes/displays adequate comfort level or baseline comfort level  Description: Interventions:  - Encourage patient to monitor pain and request assistance  - Assess pain using appropriate pain scale  - Administer analgesics based on type and severity of pain and evaluate response  - Implement non-pharmacological measures as appropriate and evaluate response  - Consider cultural and social influences on pain and pain management  - Notify physician/advanced practitioner if interventions unsuccessful or patient reports new pain  Outcome: Progressing     Problem: INFECTION - ADULT  Goal: Absence or prevention of progression during hospitalization  Description: INTERVENTIONS:  - Assess and monitor for signs and symptoms of infection  - Monitor lab/diagnostic results  - Monitor all insertion sites, i.e. indwelling lines, tubes, and drains  - Monitor endotracheal if appropriate and nasal secretions for changes in amount and color  - Quentin appropriate cooling/warming therapies per order  - Administer medications as ordered  - Instruct and encourage patient and family to use good hand hygiene technique  - Identify and instruct in appropriate isolation precautions for identified infection/condition  Outcome: Progressing  Goal: Absence of fever/infection during neutropenic period  Description: INTERVENTIONS:  - Monitor WBC    Outcome: Progressing     Problem: SAFETY ADULT  Goal: Patient will remain free of falls  Description: INTERVENTIONS:  - Educate patient/family on patient safety including physical limitations  - Instruct patient to call for assistance with activity   - Consult OT/PT to assist with strengthening/mobility   - Keep Call bell within reach  - Keep bed low and locked with side rails adjusted as appropriate  - Keep care items and personal belongings within reach  - Initiate and maintain comfort rounds  - Make Fall Risk Sign visible to staff  - Offer Toileting every 2 Hours,  in advance of need  - Initiate/Maintain 2alarm  - Obtain necessary fall risk management equipment: 2  - Apply yellow socks and bracelet for high fall risk patients  - Consider moving patient to room near nurses station  Outcome: Progressing  Goal: Maintain or return to baseline ADL function  Description: INTERVENTIONS:  -  Assess patient's ability to carry out ADLs; assess patient's baseline for ADL function and identify physical deficits which impact ability to perform ADLs (bathing, care of mouth/teeth, toileting, grooming, dressing, etc.)  - Assess/evaluate cause of self-care deficits   - Assess range of motion  - Assess patient's mobility; develop plan if impaired  - Assess patient's need for assistive devices and provide as appropriate  - Encourage maximum independence but intervene and supervise when necessary  - Involve family in performance of ADLs  - Assess for home care needs following discharge   - Consider OT consult to assist with ADL evaluation and planning for discharge  - Provide patient education as appropriate  Outcome: Progressing  Goal: Maintains/Returns to pre admission functional level  Description: INTERVENTIONS:  - Perform AM-PAC 6 Click Basic Mobility/ Daily Activity assessment daily.  - Set and communicate daily mobility goal to care team and patient/family/caregiver.   - Collaborate with rehabilitation services on mobility goals if consulted  - Perform Range of Motion 2 times a day.  - Reposition patient every 2 hours.  - Dangle patient 2 times a day  - Stand patient 2 times a day  - Ambulate patient 2 times a day  - Out of bed to chair 2 times a day   - Out of bed for meals 2 times a day  - Out of bed for toileting  - Record patient progress and toleration of activity level   Outcome: Progressing     Problem: DISCHARGE PLANNING  Goal: Discharge to home or other facility with appropriate resources  Description: INTERVENTIONS:  - Identify barriers to discharge w/patient and caregiver  -  Arrange for needed discharge resources and transportation as appropriate  - Identify discharge learning needs (meds, wound care, etc.)  - Arrange for interpretive services to assist at discharge as needed  - Refer to Case Management Department for coordinating discharge planning if the patient needs post-hospital services based on physician/advanced practitioner order or complex needs related to functional status, cognitive ability, or social support system  Outcome: Progressing     Problem: Knowledge Deficit  Goal: Patient/family/caregiver demonstrates understanding of disease process, treatment plan, medications, and discharge instructions  Description: Complete learning assessment and assess knowledge base.  Interventions:  - Provide teaching at level of understanding  - Provide teaching via preferred learning methods  Outcome: Progressing     Problem: GENITOURINARY - ADULT  Goal: Maintains or returns to baseline urinary function  Description: INTERVENTIONS:  - Assess urinary function  - Encourage oral fluids to ensure adequate hydration if ordered  - Administer IV fluids as ordered to ensure adequate hydration  - Administer ordered medications as needed  - Offer frequent toileting  - Follow urinary retention protocol if ordered  Outcome: Progressing  Goal: Absence of urinary retention  Description: INTERVENTIONS:  - Assess patient’s ability to void and empty bladder  - Monitor I/O  - Bladder scan as needed  - Discuss with physician/AP medications to alleviate retention as needed  - Discuss catheterization for long term situations as appropriate  Outcome: Progressing  Goal: Urinary catheter remains patent  Description: INTERVENTIONS:  - Assess patency of urinary catheter  - If patient has a chronic guerrero, consider changing catheter if non-functioning  - Follow guidelines for intermittent irrigation of non-functioning urinary catheter  Outcome: Progressing     Problem: METABOLIC, FLUID AND ELECTROLYTES -  ADULT  Goal: Electrolytes maintained within normal limits  Description: INTERVENTIONS:  - Monitor labs and assess patient for signs and symptoms of electrolyte imbalances  - Administer electrolyte replacement as ordered  - Monitor response to electrolyte replacements, including repeat lab results as appropriate  - Instruct patient on fluid and nutrition as appropriate  Outcome: Progressing  Goal: Fluid balance maintained  Description: INTERVENTIONS:  - Monitor labs   - Monitor I/O and WT  - Instruct patient on fluid and nutrition as appropriate  - Assess for signs & symptoms of volume excess or deficit  Outcome: Progressing  Goal: Glucose maintained within target range  Description: INTERVENTIONS:  - Monitor Blood Glucose as ordered  - Assess for signs and symptoms of hyperglycemia and hypoglycemia  - Administer ordered medications to maintain glucose within target range  - Assess nutritional intake and initiate nutrition service referral as needed  Outcome: Progressing

## 2024-01-25 NOTE — TELEPHONE ENCOUNTER
Patient is requesting note for work from stating that she was under Dr. Jaime's care from 1/23/24 to 1/25/23 and tomorrow if she still has bladder spasms she will not go in to work either. She asked that we wait until tomorrow morning to type and send the letter in case that she needs to take another day.

## 2024-01-25 NOTE — CONSULTS
Consultation - Urology   Hailee Willis 63 y.o. female MRN: [id number]  Unit/Bed#: -01 Encounter: [id number]      Assessment/Plan      Assessment:  Status post cystoscopy, left ureteroscopy laser lithotripsy of large left renal calculi and left stent placement, postop day #2 with accidental stent removal and distal left ureteral fragment 3 mm.    Plan:  Discussed with the patient this morning her options which included observation, in which I recommended since she is nontoxic, urine not infected and her pain is well-controlled and all likelihood will pass the small distal fragment.    The other option included ureteroscopy and possible replacement of her left stent.    Patient agrees with my recommendation and would like to pass the stone.  She does have follow-up with Dr. Jaiem in [place] on February 9 to discuss option for the remaining stones in the left renal unit.    Okay by urology, for regular diet for the patient.  If patient is doing well by lunchtime    Okay for discharge to home.  I would recommend her going back on her Bactrim DS and taking it twice a day for 3 days then it going back to the daily dose.  Also recommend Pyridium, Ditropan, and patient states she does have outpatient pain medicines.    Any questions feel free to reach out to urology thanks    History of Present Illness   Attending: Carmen Clark MD  HPI: Hailee Willis is a 63 y.o. year old female who presents with postop day #2 status post cystoscopy left ureteroscopy laser lithotripsy of large left staghorn stone down in [facility] urology.  Patient was to have her stent removed at home on Friday, 1/26/2024, however she accidentally remove the stent last evening and report to the ER with acute left flank pain.    I reviewed Dr. Jaime's op report, and reviewed her current CT scan here at [facility].  She does have a small 3 mm distal left ureteral fragment from her surgical procedure with left hydronephrosis.   The other stones were noted throughout the kidney on the left.  And she has a stable nonobstructing right calculi.    The patient is not febrile, vital signs are stable and nontoxic-appearing.  Medicine made her n.p.o., and she was given ceftriaxone IV.  Her urinalysis did not show pyuria or bacteriuria.      REVIEW OF SYSTEMS  Const: Denies chills, fever and weight loss.  CV: Denies chest pain.  Resp: Denies SOB.  GI: Denies abdominal pain, nausea and vomiting.  : Denies symptoms other than stated above.  Musculo: Denies back pain.    Historical Information   Past Medical History:   Diagnosis Date    Breast cancer (HCC)     Pt had in left breast- had radiation and surgery    COVID-19     pt had 2 times- ; 2022    CPAP (continuous positive airway pressure) dependence     Pt uses nightly    GERD (gastroesophageal reflux disease)     H/O cervical fracture     Hyperlipidemia     Irregular heart beat     Pt is taking Zetia. Pt does not have happen anymore per pt since on medication    Kidney stone     Liver disease     fatty liver    Pancreatitis     Pericarditis     Rheumatoid arthritis (HCC)     Seasonal allergies     Sleep apnea     Wears glasses      Past Surgical History:   Procedure Laterality Date    BLADDER SUSPENSION      BREAST LUMPECTOMY       SECTION      x2    COLONOSCOPY      EGD      FL RETROGRADE PYELOGRAM  2021    FOOT SURGERY Right     HYSTERECTOMY      IR NEPHROURETERAL ACCESS FOR UROLOGY PCNL  2023    IR NEPHROURETERAL STENT PLACEMENT Left     AR CYSTO BLADDER W/URETERAL CATHETERIZATION Left 2021    Procedure: CYSTOSCOPY RETROGRADE PYELOGRAM WITH INSERTION STENT URETERAL;  Surgeon: Donny Magana MD;  Location: [facility];  Service: Urology    AR CYSTO/URETERO W/LITHOTRIPSY &INDWELL STENT INSRT Left 2021    Procedure: CYSTOSCOPY URETEROSCOPY WITH LITHOTRIPSY HOLMIUM LASER,  AND INSERTION STENT URETERAL;  Surgeon: Jason Beckett MD;  Location:   MAIN OR;  Service: Urology    MN CYSTO/URETERO W/LITHOTRIPSY &INDWELL STENT INSRT Left 12/21/2022    Procedure: CYSTOSCOPY URETEROSCOPY WITH LITHOTRIPSY HOLMIUM LASER,  INSERTION STENT URETERAL, attempted removal of foreign body;  Surgeon: Jason Beckett MD;  Location: [facility];  Service: Urology    MN CYSTO/URETERO W/LITHOTRIPSY &INDWELL STENT INSRT Left 1/23/2024    Procedure: CYSTOSCOPY URETEROSCOPY WITH,BALLOON DILATION ,  LITHOTRIPSY HOLMIUM LASER, RETROGRADE PYELOGRAM AND INSERTION STENT URETERAL;  Surgeon: Marshall Jaime MD;  Location: [facility];  Service: Urology    SINUS SURGERY      Deviated septum     Social History   Social History     Substance and Sexual Activity   Alcohol Use Yes    Comment: rarely, socially     E-Cigarette/Vaping    E-Cigarette Use Never User      E-Cigarette/Vaping Substances    Nicotine No     THC No     CBD No     Flavoring No     Other No     Unknown No      Social History     Tobacco Use   Smoking Status Former    Current packs/day: 0.00    Types: Cigarettes    Quit date: 2009    Years since quitting: 15.0   Smokeless Tobacco Never         Meds/Allergies   all current active meds have been reviewed, current meds:   Current Facility-Administered Medications   Medication Dose Route Frequency    acetaminophen (TYLENOL) tablet 650 mg  650 mg Oral Q6H PRN    [START ON 1/26/2024] cefTRIAXone (ROCEPHIN) IVPB (premix in dextrose) 1,000 mg 50 mL  1,000 mg Intravenous Q24H    ezetimibe (ZETIA) tablet 10 mg  10 mg Oral Daily    morphine injection 2 mg  2 mg Intravenous Q3H PRN    morphine injection 4 mg  4 mg Intravenous Q4H PRN    ondansetron (ZOFRAN) injection 4 mg  4 mg Intravenous Q8H PRN    sodium chloride 0.9 % infusion  125 mL/hr Intravenous Continuous   , and PTA meds:   Prior to Admission Medications   Prescriptions Last Dose Informant Patient Reported? Taking?   Ascorbic Acid (Vitamin C) 500 MG CAPS Past Month Self Yes Yes   Sig: Take 1 capsule by mouth in the morning    Cholecalciferol 50 MCG ( UT) CAPS 2024 Self Yes Yes   Sig: Take 1 capsule by mouth daily 1000 units daily   Ergocalciferol 50 MCG ( UT) CAPS Not Taking Self Yes No   Sig: Take by mouth   Patient not taking: Reported on 2023   HYDROcodone-acetaminophen (NORCO) 5-325 mg per tablet Not Taking  No No   Sig: Take 1 tablet by mouth every 4 (four) hours as needed for pain for up to 2 days Max Daily Amount: 6 tablets   Patient not taking: Reported on 2024   Multiple Vitamins-Minerals (Multivitamin Gummies Womens) CHEW Past Month Self Yes Yes   Sig: Chew 2 each daily   amLODIPine (NORVASC) 2.5 mg tablet 2024 Self No Yes   Sig: Take 1 tablet (2.5 mg total) by mouth daily   atorvastatin (LIPITOR) 40 mg tablet 2024 Self Yes Yes   Sig: Take 40 mg by mouth   chlorthalidone 25 mg tablet 2024 Self No Yes   Sig: Take 0.5 tablets (12.5 mg total) by mouth every other day   cyanocobalamin (VITAMIN B-12) 1000 MCG tablet 2024 Self Yes Yes   Sig: Take 1,000 mcg by mouth daily   ezetimibe (ZETIA) 10 mg tablet  Self Yes No   Sig: Take 10 mg by mouth daily    fluticasone (FLONASE) 50 mcg/act nasal spray 2024 Self Yes Yes   Si spray into each nostril as needed    leflunomide (ARAVA) 20 MG tablet 2024 Self Yes Yes   Sig: Take 20 mg by mouth daily   meclizine (ANTIVERT) 25 mg tablet Not Taking Self No No   Sig: Take 1 tablet (25 mg total) by mouth 3 (three) times a day as needed for dizziness   Patient not taking: Reported on 2024   montelukast (SINGULAIR) 10 mg tablet 2024 Self Yes Yes   Sig: Take 10 mg by mouth daily   pantoprazole (PROTONIX) 40 mg tablet 2024 Self Yes Yes   Sig: Take 40 mg by mouth daily   potassium chloride (MICRO-K) 10 MEQ CR capsule Not Taking Self No No   Sig: Take 1 capsule (10 mEq total) by mouth daily   Patient not taking: Reported on 2024   rosuvastatin (CRESTOR) 5 mg tablet 2024 Self Yes Yes   Sig: Take 5 mg by mouth daily   sodium  "chloride, PF, 0.9 %  Self No No   Sig: 10 mL by Intracatheter route daily Intracatheter flushing daily. May substitute prefilled syringe with normal saline 10 mL vials, 10 mL syringes, and 18 g blunt needles   Patient not taking: Reported on 1/22/2024   sulfamethoxazole-trimethoprim (BACTRIM DS) 800-160 mg per tablet 1/24/2024 Self No Yes   Sig: Take 1 tablet by mouth daily      Facility-Administered Medications: None     Allergies   Allergen Reactions    Azathioprine Swelling     Pt reports face gets \"puffy\", red and face feels hot.    Ciprofloxacin Hives     ? Rash      Amoxicillin-Pot Clavulanate Hives    Celecoxib Rash    Colchicine Rash    Penicillins Rash       Objective   Vitals: Blood pressure 121/67, pulse 67, temperature 97.5 °F (36.4 °C), temperature source Temporal, resp. rate 19, height 5' 4\" (1.626 m), weight 92 kg (202 lb 13.2 oz), SpO2 95%.    I/O last 24 hours:  In: 702.5 [I.V.:702.5]  Out: -     Invasive Devices       Peripheral Intravenous Line  Duration             Peripheral IV 01/24/24 Right Antecubital <1 day              Drain  Duration             Ureteral Internal Stent Left ureter 6 Fr. 399 days    Nephrostomy Left 10.2 Fr. 359 days                    PHYSICAL EXAM  Const: Appears healthy and well developed. No signs of acute distress present.  Resp: Respirations are regular and unlabored.   CV: Rate is regular. Rhythm is regular.  Abdomen: Abdomen is soft, nontender, and nondistended. Kidneys are not palpable.  : dnp patient currently with minimal left flank pain.  Psych: Patient's attitude is cooperative. Mood is normal. Affect is normal.    Lab Results: I have personally reviewed pertinent reports.    CBC:   Lab Results   Component Value Date    WBC 13.98 (H) 01/24/2024    HGB 12.0 01/24/2024    HCT 39.1 01/24/2024    MCV 90 01/24/2024     01/24/2024    RBC 4.36 01/24/2024    MCH 27.5 01/24/2024    MCHC 30.7 (L) 01/24/2024    RDW 13.4 01/24/2024    MPV 10.0 01/24/2024    NRBC 0 " "01/24/2024     CMP:   Lab Results   Component Value Date    SODIUM 135 01/24/2024     01/24/2024    CO2 24 01/24/2024    BUN 23 01/24/2024    CREATININE 1.26 01/24/2024    CALCIUM 9.2 01/24/2024    AST 16 01/24/2024    ALT 16 01/24/2024    ALKPHOS 92 01/24/2024    EGFR 46 01/24/2024     Urinalysis:   Lab Results   Component Value Date    COLORU Yellow 01/24/2024    CLARITYU Clear 01/24/2024    SPECGRAV >=1.030 01/24/2024    PHUR 6.5 01/24/2024    LEUKOCYTESUR Negative 01/24/2024    NITRITE Negative 01/24/2024    GLUCOSEU Negative 01/24/2024    KETONESU Negative 01/24/2024    BILIRUBINUR Negative 01/24/2024    BLOODU Large (A) 01/24/2024     Urine Culture: No results found for: \"URINECX\"  PSA: No results found for: \"PSA\"  Imaging Studies: I have personally reviewed pertinent reports.   and I have personally reviewed pertinent films in PACS  EKG, Pathology, and Other Studies: I have personally reviewed pertinent reports.   and I have personally reviewed pertinent films in PACS    Code Status: Level 1 - Full Code  Advance Directive and Living Will:      Power of :    POLST:      Counseling / Coordination of Care  Total floor / unit time spent today  minutes. Greater than 50% of total time was spent with the patient and / or family counseling and / or coordination of care. A description of the counseling / coordination of care:    "

## 2024-01-25 NOTE — PLAN OF CARE
Problem: PAIN - ADULT  Goal: Verbalizes/displays adequate comfort level or baseline comfort level  Description: Interventions:  - Encourage patient to monitor pain and request assistance  - Assess pain using appropriate pain scale  - Administer analgesics based on type and severity of pain and evaluate response  - Implement non-pharmacological measures as appropriate and evaluate response  - Consider cultural and social influences on pain and pain management  - Notify physician/advanced practitioner if interventions unsuccessful or patient reports new pain  Outcome: Progressing     Problem: INFECTION - ADULT  Goal: Absence or prevention of progression during hospitalization  Description: INTERVENTIONS:  - Assess and monitor for signs and symptoms of infection  - Monitor lab/diagnostic results  - Monitor all insertion sites, i.e. indwelling lines, tubes, and drains  - Monitor endotracheal if appropriate and nasal secretions for changes in amount and color  - Homeland appropriate cooling/warming therapies per order  - Administer medications as ordered  - Instruct and encourage patient and family to use good hand hygiene technique  - Identify and instruct in appropriate isolation precautions for identified infection/condition  Outcome: Progressing  Goal: Absence of fever/infection during neutropenic period  Description: INTERVENTIONS:  - Monitor WBC    Outcome: Progressing     Problem: SAFETY ADULT  Goal: Patient will remain free of falls  Description: INTERVENTIONS:  - Educate patient/family on patient safety including physical limitations  - Instruct patient to call for assistance with activity   - Consult OT/PT to assist with strengthening/mobility   - Keep Call bell within reach  - Keep bed low and locked with side rails adjusted as appropriate  - Keep care items and personal belongings within reach  - Initiate and maintain comfort rounds  - Make Fall Risk Sign visible to staff  - Offer Toileting every 2 Hours,  in advance of need  - Initiate/Maintain alarm  - Obtain necessary fall risk management equipment  - Apply yellow socks and bracelet for high fall risk patients  - Consider moving patient to room near nurses station  Outcome: Progressing  Goal: Maintain or return to baseline ADL function  Description: INTERVENTIONS:  -  Assess patient's ability to carry out ADLs; assess patient's baseline for ADL function and identify physical deficits which impact ability to perform ADLs (bathing, care of mouth/teeth, toileting, grooming, dressing, etc.)  - Assess/evaluate cause of self-care deficits   - Assess range of motion  - Assess patient's mobility; develop plan if impaired  - Assess patient's need for assistive devices and provide as appropriate  - Encourage maximum independence but intervene and supervise when necessary  - Involve family in performance of ADLs  - Assess for home care needs following discharge   - Consider OT consult to assist with ADL evaluation and planning for discharge  - Provide patient education as appropriate  Outcome: Progressing  Goal: Maintains/Returns to pre admission functional level  Description: INTERVENTIONS:  - Perform AM-PAC 6 Click Basic Mobility/ Daily Activity assessment daily.  - Set and communicate daily mobility goal to care team and patient/family/caregiver.   - Collaborate with rehabilitation services on mobility goals if consulted  - Perform Range of Motion 3 times a day.  - Reposition patient every 2 hours.  - Dangle patient 3 times a day  - Stand patient 3 times a day  - Ambulate patient 3 times a day  - Out of bed to chair 3 times a day   - Out of bed for meals 3 times a day  - Out of bed for toileting  - Record patient progress and toleration of activity level   Outcome: Progressing     Problem: DISCHARGE PLANNING  Goal: Discharge to home or other facility with appropriate resources  Description: INTERVENTIONS:  - Identify barriers to discharge w/patient and caregiver  - Arrange  for needed discharge resources and transportation as appropriate  - Identify discharge learning needs (meds, wound care, etc.)  - Arrange for interpretive services to assist at discharge as needed  - Refer to Case Management Department for coordinating discharge planning if the patient needs post-hospital services based on physician/advanced practitioner order or complex needs related to functional status, cognitive ability, or social support system  Outcome: Progressing     Problem: Knowledge Deficit  Goal: Patient/family/caregiver demonstrates understanding of disease process, treatment plan, medications, and discharge instructions  Description: Complete learning assessment and assess knowledge base.  Interventions:  - Provide teaching at level of understanding  - Provide teaching via preferred learning methods  Outcome: Progressing     Problem: GENITOURINARY - ADULT  Goal: Maintains or returns to baseline urinary function  Description: INTERVENTIONS:  - Assess urinary function  - Encourage oral fluids to ensure adequate hydration if ordered  - Administer IV fluids as ordered to ensure adequate hydration  - Administer ordered medications as needed  - Offer frequent toileting  - Follow urinary retention protocol if ordered  Outcome: Progressing  Goal: Absence of urinary retention  Description: INTERVENTIONS:  - Assess patient’s ability to void and empty bladder  - Monitor I/O  - Bladder scan as needed  - Discuss with physician/AP medications to alleviate retention as needed  - Discuss catheterization for long term situations as appropriate  Outcome: Progressing  Goal: Urinary catheter remains patent  Description: INTERVENTIONS:  - Assess patency of urinary catheter  - If patient has a chronic guerrero, consider changing catheter if non-functioning  - Follow guidelines for intermittent irrigation of non-functioning urinary catheter  Outcome: Progressing     Problem: METABOLIC, FLUID AND ELECTROLYTES - ADULT  Goal:  Electrolytes maintained within normal limits  Description: INTERVENTIONS:  - Monitor labs and assess patient for signs and symptoms of electrolyte imbalances  - Administer electrolyte replacement as ordered  - Monitor response to electrolyte replacements, including repeat lab results as appropriate  - Instruct patient on fluid and nutrition as appropriate  Outcome: Progressing  Goal: Fluid balance maintained  Description: INTERVENTIONS:  - Monitor labs   - Monitor I/O and WT  - Instruct patient on fluid and nutrition as appropriate  - Assess for signs & symptoms of volume excess or deficit  Outcome: Progressing  Goal: Glucose maintained within target range  Description: INTERVENTIONS:  - Monitor Blood Glucose as ordered  - Assess for signs and symptoms of hyperglycemia and hypoglycemia  - Administer ordered medications to maintain glucose within target range  - Assess nutritional intake and initiate nutrition service referral as needed  Outcome: Progressing

## 2024-01-25 NOTE — DISCHARGE SUMMARY
[facility]  Discharge- Hailee Willis 1960, 63 y.o. female MRN: [id number]  Unit/Bed#: MS [id number] Encounter: [id number]  Primary Care Provider: Jonas Pang MD   Date and time admitted to hospital: 1/24/2024  7:39 PM    * Hydronephrosis of left kidney  Assessment & Plan  S/p left ureteral stent placement 1/23, accidentally removed by patient  CT abdomen pelvis- 3 x 4 mm distal left ureteral calculus resulting in severe left hydroureteronephrosis with periureteral and perinephric stranding.Additional nonobstructing intrarenal calculi throughout the left kidney the largest measuring 7 mm in the lower pole and 5 mm in the upper pole. Stable nonobstructing right-sided intrarenal calculus measuring 2 mm.  IV hydration  Pain control  UA without pyuria or bacteriuria  seen by urology and continue conservative management increase fluid intake placed on Pyridium Ditropan and Bactrim DS 1 tablet twice daily for 5 days and then resume once daily as per her home regimen  outPatient follow-up with her primary urologist recommended in a few days    Primary hypertension  Assessment & Plan  Continue PTA amlodipine, chlorthalidone  BP controlled    Discharging Physician / Practitioner: Zabrina Ash MD  PCP: Jonas Pang MD  Admission Date:   Admission Orders (From admission, onward)       Ordered        01/24/24 2147  Place in Observation  Once                          Discharge Date: 01/25/24    Medical Problems       Resolved Problems  Date Reviewed: 1/25/2024   None         Consultations During Hospital Stay:  urology    Procedures Performed:   none    Significant Findings / Test Results:   CT renal stone study abdomen pelvis wo contrast    Result Date: 1/24/2024  Impression: 3 x 4 mm distal left ureteral calculus resulting in severe left hydroureteronephrosis with periureteral and perinephric stranding. Additional nonobstructing intrarenal calculi throughout the left kidney the largest  "measuring 7 mm in the lower pole and 5 mm in the upper pole. Stable nonobstructing right-sided intrarenal calculus measuring 2 mm. Colonic diverticulosis without diverticulitis. Workstation performed: [id number]      Incidental Findings:   none    Test Results Pending at Discharge (will require follow up):   none     Outpatient Tests Requested:  Cbc,bmp in 1 week    Complications:  none    Reason for Admission: Left-sided flank pain    Hospital Course:     Hailee Willis is a 63 y.o. female patient who originally presented to the hospital on 1/24/2024 due to left-sided flank pain.  Patient recently had a urological procedure done and stent placed which came out yesterday following that she was having a lot of left-sided flank pain and came to the ED found to have left-sided hydro ureteral nephrosis.  Placed on IV fluid hydration antibiotics and seen by urology who recommended conservative management at this time patient's pain is controlled she is tolerating a diet will discharge with a course of peritoneum Ditropan and Bactrim DS 1 tablet twice daily for 5 days and outpatient follow-up with her regular urologist and she can resume her regular Bactrim DS after 5 days which is once daily .She will try to pass the stone herself the other option will be replacement of her left stent.        Please see above list of diagnoses and related plan for additional information.     Condition at Discharge: good     Discharge Day Visit / Exam:     Subjective: She denies any chest pain or shortness of breath or abdominal pain or flank pain.  Currently tolerating diet  Vitals: Blood Pressure: 110/63 (01/25/24 0831)  Pulse: 87 (01/25/24 0831)  Temperature: 97.5 °F (36.4 °C) (01/25/24 0831)  Temp Source: Temporal (01/24/24 2243)  Respirations: 18 (01/25/24 0831)  Height: 5' 4\" (162.6 cm) (01/24/24 2243)  Weight - Scale: 92 kg (202 lb 13.2 oz) (01/25/24 0002)  SpO2: 95 % (01/25/24 0831)      Discussion with Family: None    Discharge " instructions/Information to patient and family:   See after visit summary for information provided to patient and family.      Provisions for Follow-Up Care:  See after visit summary for information related to follow-up care and any pertinent home health orders.      Disposition:     Home    For Discharges to [facility]:   Not Applicable to this Patient - Not Applicable to this Patient    Planned Readmission: none     Discharge Statement:  I spent 35 minutes discharging the patient. This time was spent on the day of discharge. I had direct contact with the patient on the day of discharge. Greater than 50% of the total time was spent examining patient, answering all patient questions, arranging and discussing plan of care with patient as well as directly providing post-discharge instructions.  Additional time then spent on discharge activities.    Discharge Medications:  See after visit summary for reconciled discharge medications provided to patient and family.      ** Please Note: This note has been constructed using a voice recognition system **

## 2024-01-25 NOTE — ASSESSMENT & PLAN NOTE
Continue PTA amlodipine, chlorthalidone  Patient requested to take home medications on discharge  If will be staying tonight, we will order medications

## 2024-01-25 NOTE — H&P
[facility]  H&P  Name: Hailee Willis 63 y.o. female I MRN: [id number]  Unit/Bed#: -01 I Date of Admission: 1/24/2024   Date of Service: 1/25/2024 I Hospital Day: 0      Assessment/Plan   * Hydronephrosis of left kidney  Assessment & Plan  S/p left ureteral stent placement 1/23, accidentally removed by patient  CT abdomen pelvis- 3 x 4 mm distal left ureteral calculus resulting in severe left hydroureteronephrosis with periureteral and perinephric stranding.Additional nonobstructing intrarenal calculi throughout the left kidney the largest measuring 7 mm in the lower pole and 5 mm in the upper pole. Stable nonobstructing right-sided intrarenal calculus measuring 2 mm.  N.p.o. pending urology consultation  IV hydration  Pain control  Rx Bactrim in the outpatient setting, transition to ceftriaxone while n.p.o.  UA without pyuria or bacteriuria    Primary hypertension  Assessment & Plan  Continue PTA amlodipine, chlorthalidone  Patient requested to take home medications on discharge  If will be staying tonight, we will order medications    Gastroesophageal reflux disease without esophagitis  Assessment & Plan  Continue PPI           VTE Pharmacologic Prophylaxis:   Moderate Risk (Score 3-4) - Pharmacological DVT Prophylaxis Contraindicated. Sequential Compression Devices Ordered.  Code Status: Level 1 - Full Code   Discussion with family: Patient declined call to .     Anticipated Length of Stay: Patient will be admitted on an observation basis with an anticipated length of stay of less than 2 midnights secondary to ureteral stone.    Chief Complaint: Flank pain    History of Present Illness:  Hailee Willis is a 63 y.o. female with a PMH of left breast cancer s/p radiation and surgery, PEARL on CPAP, GERD, hyperlipidemia, pancreatitis, pericarditis, rheumatoid arthritis, recurrent ureteral stones who is s/p left ureteral stent placed 1/23 at [facility] but  unfortunately tonight the ureteral stent was accidentally removed.  CT abdomen pelvis reveals obstructing eft distal calculus without pyuria or bacteriuria.  Patient will be n.p.o. pending urology consultation.    Review of Systems:  Review of Systems   Constitutional:  Negative for chills and fever.   HENT:  Negative for ear pain and sore throat.    Eyes:  Negative for pain and visual disturbance.   Respiratory:  Negative for cough and shortness of breath.    Cardiovascular:  Negative for chest pain and palpitations.   Gastrointestinal:  Positive for abdominal pain.   Genitourinary:  Positive for flank pain. Negative for dysuria and hematuria.   Musculoskeletal:  Negative for arthralgias and back pain.   Skin:  Negative for color change and rash.   Neurological:  Negative for seizures and syncope.   All other systems reviewed and are negative.      Past Medical and Surgical History:   Past Medical History:   Diagnosis Date    Breast cancer (HCC)     Pt had in left breast- had radiation and surgery    COVID-19     pt had 2 times- ; 2022    CPAP (continuous positive airway pressure) dependence     Pt uses nightly    GERD (gastroesophageal reflux disease)     H/O cervical fracture     Hyperlipidemia     Irregular heart beat     Pt is taking Zetia. Pt does not have happen anymore per pt since on medication    Kidney stone     Liver disease     fatty liver    Pancreatitis     Pericarditis     Rheumatoid arthritis (HCC)     Seasonal allergies     Sleep apnea     Wears glasses        Past Surgical History:   Procedure Laterality Date    BLADDER SUSPENSION      BREAST LUMPECTOMY       SECTION      x2    COLONOSCOPY      EGD      FL RETROGRADE PYELOGRAM  2021    FOOT SURGERY Right     HYSTERECTOMY      IR NEPHROURETERAL ACCESS FOR UROLOGY PCNL  2023    IR NEPHROURETERAL STENT PLACEMENT Left     [place] CYSTO BLADDER W/URETERAL CATHETERIZATION Left 2021    Procedure: CYSTOSCOPY RETROGRADE  PYELOGRAM WITH INSERTION STENT URETERAL;  Surgeon: Donny Magana MD;  Location: [facility];  Service: Urology    ND CYSTO/URETERO W/LITHOTRIPSY &INDWELL STENT INSRT Left 05/19/2021    Procedure: CYSTOSCOPY URETEROSCOPY WITH LITHOTRIPSY HOLMIUM LASER,  AND INSERTION STENT URETERAL;  Surgeon: Jason Beckett MD;  Location: [facility];  Service: Urology    ND CYSTO/URETERO W/LITHOTRIPSY &INDWELL STENT INSRT Left 12/21/2022    Procedure: CYSTOSCOPY URETEROSCOPY WITH LITHOTRIPSY HOLMIUM LASER,  INSERTION STENT URETERAL, attempted removal of foreign body;  Surgeon: Jason Beckett MD;  Location: [facility];  Service: Urology    ND CYSTO/URETERO W/LITHOTRIPSY &INDWELL STENT INSRT Left 1/23/2024    Procedure: CYSTOSCOPY URETEROSCOPY WITH,BALLOON DILATION ,  LITHOTRIPSY HOLMIUM LASER, RETROGRADE PYELOGRAM AND INSERTION STENT URETERAL;  Surgeon: Marshall Jaime MD;  Location: [facility];  Service: Urology    SINUS SURGERY      Deviated septum       Meds/Allergies:  Prior to Admission medications    Medication Sig Start Date End Date Taking? Authorizing Provider   amLODIPine (NORVASC) 2.5 mg tablet Take 1 tablet (2.5 mg total) by mouth daily 12/12/23  Yes Margarita Dos Santos DO   Ascorbic Acid (Vitamin C) 500 MG CAPS Take 1 capsule by mouth in the morning   Yes Historical Provider, MD   atorvastatin (LIPITOR) 40 mg tablet Take 40 mg by mouth 4/17/23  Yes Historical Provider, MD   chlorthalidone 25 mg tablet Take 0.5 tablets (12.5 mg total) by mouth every other day 12/12/23  Yes Margarita Dos Santos DO   Cholecalciferol 50 MCG (2000 UT) CAPS Take 1 capsule by mouth daily 1000 units daily   Yes Historical Provider, MD   cyanocobalamin (VITAMIN B-12) 1000 MCG tablet Take 1,000 mcg by mouth daily   Yes Historical Provider, MD   fluticasone (FLONASE) 50 mcg/act nasal spray 1 spray into each nostril as needed    Yes Historical Provider, MD   leflunomide (ARAVA) 20 MG tablet Take 20 mg by mouth daily   Yes Historical Provider, MD  "  montelukast (SINGULAIR) 10 mg tablet Take 10 mg by mouth daily   Yes Historical Provider, MD   Multiple Vitamins-Minerals (Multivitamin Gummies Womens) CHEW Chew 2 each daily   Yes Historical Provider, MD   pantoprazole (PROTONIX) 40 mg tablet Take 40 mg by mouth daily 11/23/20  Yes Historical Provider, MD   rosuvastatin (CRESTOR) 5 mg tablet Take 5 mg by mouth daily 9/15/23 9/14/24 Yes Historical Provider, MD   sulfamethoxazole-trimethoprim (BACTRIM DS) 800-160 mg per tablet Take 1 tablet by mouth daily 1/3/24 2/2/24 Yes Mina Roach MD   Ergocalciferol 50 MCG (2000 UT) CAPS Take by mouth  Patient not taking: Reported on 11/29/2023    Historical Provider, MD   ezetimibe (ZETIA) 10 mg tablet Take 10 mg by mouth daily  11/7/20 1/23/24  Historical Provider, MD   HYDROcodone-acetaminophen (NORCO) 5-325 mg per tablet Take 1 tablet by mouth every 4 (four) hours as needed for pain for up to 2 days Max Daily Amount: 6 tablets  Patient not taking: Reported on 1/24/2024 1/23/24 1/25/24  Marshall Jaime MD   meclizine (ANTIVERT) 25 mg tablet Take 1 tablet (25 mg total) by mouth 3 (three) times a day as needed for dizziness  Patient not taking: Reported on 1/22/2024 3/27/23   Kayce Teran,    potassium chloride (MICRO-K) 10 MEQ CR capsule Take 1 capsule (10 mEq total) by mouth daily  Patient not taking: Reported on 1/22/2024 12/12/23   Margarita Dos Santos,    sodium chloride, PF, 0.9 % 10 mL by Intracatheter route daily Intracatheter flushing daily. May substitute prefilled syringe with normal saline 10 mL vials, 10 mL syringes, and 18 g blunt needles  Patient not taking: Reported on 1/22/2024 1/30/23 1/22/24  Brigid Kurtz MD     I have reviewed home medications with patient personally.    Allergies:   Allergies   Allergen Reactions    Azathioprine Swelling     Pt reports face gets \"puffy\", red and face feels hot.    Ciprofloxacin Hives     ? Rash      Amoxicillin-Pot Clavulanate Hives    Celecoxib Rash    " "Colchicine Rash    Penicillins Rash       Social History:  Marital Status: /Civil Union   Patient Pre-hospital Living Situation: Home  Patient Pre-hospital Level of Mobility: walks  Patient Pre-hospital Diet Restrictions: none  Substance Use History:   Social History     Substance and Sexual Activity   Alcohol Use Yes    Comment: rarely, socially     Social History     Tobacco Use   Smoking Status Former    Current packs/day: 0.00    Types: Cigarettes    Quit date: 2009    Years since quitting: 15.0   Smokeless Tobacco Never     Social History     Substance and Sexual Activity   Drug Use Not Currently       Family History:  Family History   Problem Relation Age of Onset    Lung cancer Father     Heart disease Maternal Aunt     Heart disease Maternal Uncle     Kidney cancer Maternal Grandfather     Lung cancer Maternal Grandfather     Heart attack Brother        Physical Exam:     Vitals:   Blood Pressure: 121/67 (01/24/24 2243)  Pulse: 67 (01/24/24 2243)  Temperature: 97.5 °F (36.4 °C) (01/24/24 2243)  Temp Source: Temporal (01/24/24 2243)  Respirations: 19 (01/24/24 2243)  Height: 5' 4\" (162.6 cm) (01/24/24 2243)  Weight - Scale: 92 kg (202 lb 13.2 oz) (01/25/24 0002)  SpO2: 95 % (01/24/24 2356)    Physical Exam  Vitals and nursing note reviewed.   Constitutional:       General: She is not in acute distress.     Appearance: She is well-developed.   HENT:      Head: Normocephalic and atraumatic.      Mouth/Throat:      Mouth: Mucous membranes are dry.   Eyes:      Conjunctiva/sclera: Conjunctivae normal.   Cardiovascular:      Rate and Rhythm: Normal rate and regular rhythm.      Heart sounds: No murmur heard.  Pulmonary:      Effort: Pulmonary effort is normal. No respiratory distress.      Breath sounds: Normal breath sounds.   Abdominal:      Palpations: Abdomen is soft.      Tenderness: There is abdominal tenderness. There is left CVA tenderness.   Musculoskeletal:         General: No swelling.      " Cervical back: Neck supple.   Skin:     General: Skin is warm and dry.      Capillary Refill: Capillary refill takes less than 2 seconds.   Neurological:      General: No focal deficit present.      Mental Status: She is alert and oriented to person, place, and time.   Psychiatric:         Mood and Affect: Mood normal.         Behavior: Behavior normal.        Additional Data:     Lab Results:  Results from last 7 days   Lab Units 01/24/24 2026   WBC Thousand/uL 13.98*   HEMOGLOBIN g/dL 12.0   HEMATOCRIT % 39.1   PLATELETS Thousands/uL 329   NEUTROS PCT % 79*   LYMPHS PCT % 12*   MONOS PCT % 7   EOS PCT % 1     Results from last 7 days   Lab Units 01/24/24 2026   SODIUM mmol/L 135   POTASSIUM mmol/L 3.5   CHLORIDE mmol/L 102   CO2 mmol/L 24   BUN mg/dL 23   CREATININE mg/dL 1.26   ANION GAP mmol/L 9   CALCIUM mg/dL 9.2   ALBUMIN g/dL 4.3   TOTAL BILIRUBIN mg/dL 0.28   ALK PHOS U/L 92   ALT U/L 16   AST U/L 16   GLUCOSE RANDOM mg/dL 107                       Lines/Drains:  Invasive Devices       Peripheral Intravenous Line  Duration             Peripheral IV 01/24/24 Right Antecubital <1 day              Drain  Duration             Ureteral Internal Stent Left ureter 6 Fr. 399 days    Nephrostomy Left 10.2 Fr. 359 days                        Imaging: Reviewed radiology reports from this admission including: abdominal/pelvic CT  CT renal stone study abdomen pelvis wo contrast   Final Result by Jan Davenport MD (01/24 2109)      3 x 4 mm distal left ureteral calculus resulting in severe left hydroureteronephrosis with periureteral and perinephric stranding.      Additional nonobstructing intrarenal calculi throughout the left kidney the largest measuring 7 mm in the lower pole and 5 mm in the upper pole. Stable nonobstructing right-sided intrarenal calculus measuring 2 mm.      Colonic diverticulosis without diverticulitis.            Workstation performed: [id number]             ** Please Note: This note has been  constructed using a voice recognition system. **

## 2024-01-26 ENCOUNTER — TELEPHONE (OUTPATIENT)
Dept: UROLOGY | Facility: CLINIC | Age: 64
End: 2024-01-26

## 2024-01-26 NOTE — TELEPHONE ENCOUNTER
Spoke with patient, she returned to work today.  Return to work note completed and patient requested that it be faxed to her work at [phone] attention: Nerissa GARCIA  Note faxed and confirmation received.

## 2024-01-26 NOTE — TELEPHONE ENCOUNTER
Pt called and said that she saw Dr Roach yesterday in the hospital and needs a work note that says she can return to work today.  Pt went back to work on 1/26.  Please call pt [phone]

## 2024-03-22 DIAGNOSIS — I10 PRIMARY HYPERTENSION: ICD-10-CM

## 2024-03-22 RX ORDER — AMLODIPINE BESYLATE 2.5 MG/1
2.5 TABLET ORAL DAILY
Qty: 30 TABLET | Refills: 5 | Status: SHIPPED | OUTPATIENT
Start: 2024-03-22

## 2024-04-19 ENCOUNTER — TELEPHONE (OUTPATIENT)
Dept: NEPHROLOGY | Facility: CLINIC | Age: 64
End: 2024-04-19

## 2024-10-01 ENCOUNTER — OPTICAL OFFICE (OUTPATIENT)
Dept: URBAN - NONMETROPOLITAN AREA CLINIC 4 | Facility: CLINIC | Age: 64
Setting detail: OPHTHALMOLOGY
End: 2024-10-01

## 2024-10-01 DIAGNOSIS — H52.7: ICD-10-CM

## 2024-10-01 PROCEDURE — V2799T TAXABLE VISION SERVICE MISCELLANEOUS

## 2025-04-14 ENCOUNTER — DOCTOR'S OFFICE (OUTPATIENT)
Dept: URBAN - NONMETROPOLITAN AREA CLINIC 1 | Facility: CLINIC | Age: 65
Setting detail: OPHTHALMOLOGY
End: 2025-04-14
Payer: COMMERCIAL

## 2025-04-14 DIAGNOSIS — D31.41: ICD-10-CM

## 2025-04-14 DIAGNOSIS — H18.052: ICD-10-CM

## 2025-04-14 DIAGNOSIS — H04.123: ICD-10-CM

## 2025-04-14 DIAGNOSIS — H52.223: ICD-10-CM

## 2025-04-14 DIAGNOSIS — H25.13: ICD-10-CM

## 2025-04-14 DIAGNOSIS — H52.4: ICD-10-CM

## 2025-04-14 PROCEDURE — 92015 DETERMINE REFRACTIVE STATE: CPT | Performed by: OPTOMETRIST

## 2025-04-14 PROCEDURE — 92014 COMPRE OPH EXAM EST PT 1/>: CPT | Performed by: OPTOMETRIST

## 2025-04-14 ASSESSMENT — REFRACTION_CURRENTRX
OD_AXIS: 099
OD_SPHERE: +2.25
OD_VPRISM_DIRECTION: SV
OS_OVR_VA: 20/
OS_SPHERE: +2.25
OS_VPRISM_DIRECTION: SV
OD_OVR_VA: 20/
OS_AXIS: 092
OS_CYLINDER: -1.50
OD_CYLINDER: -1.25

## 2025-04-14 ASSESSMENT — REFRACTION_AUTOREFRACTION
OS_CYLINDER: -1.25
OS_SPHERE: +0.75
OD_CYLINDER: -1.50
OD_SPHERE: +0.75
OD_AXIS: 080
OS_AXIS: 070

## 2025-04-14 ASSESSMENT — CONFRONTATIONAL VISUAL FIELD TEST (CVF)
OS_FINDINGS: FULL
OD_FINDINGS: FULL

## 2025-04-14 ASSESSMENT — REFRACTION_MANIFEST
OD_CYLINDER: -1.50
OD_SPHERE: +0.75
OD_AXIS: 090
OD_VA1: 20/20
OS_VA2: 20/20
OS_CYLINDER: -1.25
OD_ADD: +2.50
OS_SPHERE: +0.75
OS_VA1: 20/20
OS_ADD: +2.50
OD_VA2: 20/20
OS_AXIS: 075

## 2025-04-14 ASSESSMENT — VISUAL ACUITY
OD_BCVA: 20/20-2
OS_BCVA: 20/25+2

## 2025-04-14 ASSESSMENT — SUPERFICIAL PUNCTATE KERATITIS (SPK)
OD_SPK: T
OS_SPK: T

## 2025-04-14 ASSESSMENT — TONOMETRY
OS_IOP_MMHG: 16
OD_IOP_MMHG: 16

## 2025-07-09 NOTE — PROGRESS NOTES
Patient spiked a temperature of 102° early this morning at 3:00 a m  Patient feeling better today and now remains afebrile this morning, most recent temperature 97 7  She did admit some chills earlier this morning but now resolved  Some nausea this morning  Tolerating diet  She moved her bowels yesterday  She is voiding clear yellow urine, little bit concentrated, no blood present  Still does admit some left flank discomfort  Morning labs show normal white count 8 58  BMP normal creatinine 0 97  Urine culture obtained 05/20/2021 showed no growth, she had been on p o  antibiotics prior to admission  /80   Pulse 89   Temp 97 7 °F (36 5 °C)   Resp 18   Ht 5' 4" (1 626 m)   Wt 90 9 kg (200 lb 6 4 oz)   SpO2 96%   BMI 34 40 kg/m²     I/O       05/20 0701 - 05/21 0700 05/21 0701 - 05/22 0700 05/22 0701 - 05/23 0700    P  O   840     I V  (mL/kg)  900 (9 9) 1030 (11 3)    IV Piggyback 1050 50     Total Intake(mL/kg) 1050 (11 6) 1790 (19 7) 1030 (11 3)    Urine (mL/kg/hr)  3200 (1 5) 3000 (9 5)    Stool  0     Total Output  3200 3000    Net +1050 -1410 -1970           Unmeasured Stool Occurrence  2 x         On exam the patient is awake alert  Afebrile  Abdomen soft nontender  Some mild left flank tenderness to percussion is present  Assessment:     Pyelonephritis left kidney, leukocytosis now resolved  Still spiking temperatures overnight  Sepsis present on admission secondary to above, blood cultures x2 obtained pending at this time      Post op day #3 s/p cysto, left ureteroscopy, Holmium laser of staghorn calculus, insertion left ureteral stent by Dr Dia Walters here at [facility] on Wednesday  History of 6 mm proximal left ureteral stone stone with recent urinary tract infection completed full course of Bactrim, she had a partial staghorn calculus of left upper pole  Rheumatoid arthritis     Plan:  -Patient may be discharged after she has been afebrile for at least 24 hours  -She has Physician Transition of Care Summary  Invasive Cardiovascular Lab    Procedure Date: 07/09/25     Pre Procedure Indications:   Persistent atrial fibrillation, preablation BRIANNA    Post Procedure Diagnosis:   Small size left atrial appendage with no obvious thrombus or masses visualized.    Procedure(s):   Transesophageal echocardiogram    Procedure Findings:   Normal size left ventricular cavity with mild to moderately reduced LV systolic function, with a EF of 40 to 45%.  There are no regional wall motion abnormalities.  The right ventricle with mild to moderately enlarged with preserved right ventricular systolic function.  There is moderate biatrial enlargement with no obvious thrombus or masses visualized.  Saline bubble study was positive with a right-to-left shunt, consistent with a small to moderate PFO.    The aortic valve is trileaflet with no hemodynamically significant aortic stenosis or regurgitation.  The mitral valve is structurally normal appears mildly thickened, with mild mitral regurgitation.  There is no mitral stenosis.  The tricuspid valve is mildly thickened with mild to moderate tricuspid regurgitation.  There is no tricuspid stenosis.  The pulmonic valve is not well-visualized, but there is no pulmonic stenosis or regurgitation.  There is no plaque visualized in the descending thoracic aorta.      Description of the Procedure:   Transesophageal echocardiogram    Complications:   None    Estimated Blood Loss:   None    Anesthesia: Conscious sedation     any Specimen(s) Removed: None      Electronically signed by: Eb Borges MD, 7/9/2025       been contacted by Urology via telephone already for stent removal in the office in 2 weeks   -Urology will sign off at this point as an inpatient  Continue present IV antibiotics, monitor fever curve, await blood cultures   -Left ureteral stent is in good position on CT scan imaging upon admission in the ED   -No indication for any need to reposition or remove left ureteral stent     -Treat present left pyelonephritis with current IV antibiotics, Rocephin 1 g q 12 hours, would then need continuation of oral antibiotics upon discharge for another 7 days  Urine culture pending  She had completed a full course of full course of -Bactrim prior to her urological surgery 2 days ago  -Selection of p o  antibiotics at the discretion of the hospitalist physician upon discharge, she had been on Bactrim prior to surgery   -Continue the patient on Flomax daily  -Oxybutynin was ordered, may continue as needed for discomfort after hospital discharge 5-7 days      Areli Diaz PA-C

## (undated) DEVICE — 3M™ STERI-STRIP™ COMPOUND BENZOIN TINCTURE 40 BAGS/CARTON 4 CARTONS/CASE C1544: Brand: 3M™ STERI-STRIP™

## (undated) DEVICE — SPECIMEN CONTAINER STERILE PEEL PACK

## (undated) DEVICE — ENDOSCOPIC VALVE WITH ADAPTER.: Brand: SURSEAL® II

## (undated) DEVICE — GUIDEWIRE STRGHT TIP 0.035 IN  SOLO PLUS

## (undated) DEVICE — URETERAL DUAL LUMEN CATH

## (undated) DEVICE — PACK TUR

## (undated) DEVICE — TRANSPOSAL ULTRAFLEX DUO/QUAD ULTRA CART MANIFOLD

## (undated) DEVICE — URO CATCHER BAG STERILE 0-UC32

## (undated) DEVICE — TUBING SUCTION 5MM X 12 FT

## (undated) DEVICE — LUBRICANT SURGILUBE TUBE 4 OZ  FLIP TOP

## (undated) DEVICE — CATH URETERAL 5FR X 70 CM FLEX TIP POLYUR BARD

## (undated) DEVICE — Device: Brand: LEVEL 1

## (undated) DEVICE — STERILE CYSTO PACK: Brand: CARDINAL HEALTH

## (undated) DEVICE — SHEATH URETERAL ACCESS 10/12FR 35CM PROXIS

## (undated) DEVICE — GLOVE SRG BIOGEL ORTHOPEDIC 7.5

## (undated) DEVICE — LASER FIBER HOLMIUM 272MICRON

## (undated) DEVICE — MAT FLOOR STEP DRI 24 X 36 IN N-STRL

## (undated) DEVICE — FIBER STD QUANTA 200 MICRON

## (undated) DEVICE — BASKET SPECIMEN RETRIVAL 1.9FR 120CM

## (undated) DEVICE — NGAGE, NITINOL STONE EXTRACTOR: Brand: NGAGE

## (undated) DEVICE — GLOVE SRG BIOGEL 8

## (undated) DEVICE — SINGLE-USE DIGITAL FLEXIBLE URETEROSCOPE: Brand: APTRA

## (undated) DEVICE — Device

## (undated) DEVICE — 3M™ DURAPORE™ SURGICAL TAPE 2 INCHES X 10YARDS (5.0CM X 9.1M) 6ROLLS/CARTON 10CARTONS/CASE 1538-2: Brand: 3M™ DURAPORE™

## (undated) DEVICE — GUIDEWIRE ANGLED TIP 0.035 IN SOLO PLUS

## (undated) DEVICE — GLOVE SRG BIOGEL 7

## (undated) DEVICE — INFUSER BAG 3000ML

## (undated) DEVICE — CATH BAL DIL 6FR 6MM 4CM PERC X-FRC U30 WO INFL DEV

## (undated) DEVICE — 3M™ TEGADERM™ TRANSPARENT FILM DRESSING FRAME STYLE, 1624W, 2-3/8 IN X 2-3/4 IN (6 CM X 7 CM), 100/CT 4CT/CASE: Brand: 3M™ TEGADERM™

## (undated) DEVICE — CATH URET .038 10FR 50CM DUAL LUMEN

## (undated) DEVICE — 200 MICRON SINGLE-USE HOLMIUM FIBER ASSEMBLY WITH FLAT TIP: Brand: OPTILITE

## (undated) DEVICE — SCD SEQUENTIAL COMPRESSION COMFORT SLEEVE MEDIUM KNEE LENGTH: Brand: KENDALL SCD

## (undated) DEVICE — FIBER STD QUANTA 272 MICRON

## (undated) DEVICE — SYRINGE 10ML LL

## (undated) DEVICE — PREMIUM DRY TRAY LF: Brand: MEDLINE INDUSTRIES, INC.

## (undated) DEVICE — SINGLE PORT MANIFOLD: Brand: NEPTUNE 2

## (undated) DEVICE — CHLORHEXIDINE 4PCT 4 OZ